# Patient Record
Sex: FEMALE | Race: WHITE | NOT HISPANIC OR LATINO | Employment: UNEMPLOYED | URBAN - NONMETROPOLITAN AREA
[De-identification: names, ages, dates, MRNs, and addresses within clinical notes are randomized per-mention and may not be internally consistent; named-entity substitution may affect disease eponyms.]

---

## 2017-05-09 ENCOUNTER — APPOINTMENT (OUTPATIENT)
Dept: LAB | Facility: MEDICAL CENTER | Age: 52
End: 2017-05-09
Payer: COMMERCIAL

## 2017-05-09 ENCOUNTER — TRANSCRIBE ORDERS (OUTPATIENT)
Dept: LAB | Facility: MEDICAL CENTER | Age: 52
End: 2017-05-09

## 2017-05-09 ENCOUNTER — ALLSCRIPTS OFFICE VISIT (OUTPATIENT)
Dept: OTHER | Facility: OTHER | Age: 52
End: 2017-05-09

## 2017-05-09 ENCOUNTER — HOSPITAL ENCOUNTER (OUTPATIENT)
Dept: RADIOLOGY | Facility: MEDICAL CENTER | Age: 52
Discharge: HOME | End: 2017-05-09
Payer: COMMERCIAL

## 2017-05-09 DIAGNOSIS — E11.29 TYPE 2 DIABETES MELLITUS WITH OTHER DIABETIC KIDNEY COMPLICATION (HCC): ICD-10-CM

## 2017-05-09 DIAGNOSIS — M79.671 PAIN OF RIGHT FOOT: ICD-10-CM

## 2017-05-09 DIAGNOSIS — D72.829 ELEVATED WHITE BLOOD CELL COUNT: ICD-10-CM

## 2017-05-09 LAB
ALBUMIN SERPL BCP-MCNC: 3.2 G/DL (ref 3.5–5)
ALP SERPL-CCNC: 112 U/L (ref 46–116)
ALT SERPL W P-5'-P-CCNC: 26 U/L (ref 12–78)
ANION GAP SERPL CALCULATED.3IONS-SCNC: 5 MMOL/L (ref 4–13)
AST SERPL W P-5'-P-CCNC: 10 U/L (ref 5–45)
BILIRUB SERPL-MCNC: 0.68 MG/DL (ref 0.2–1)
BUN SERPL-MCNC: 12 MG/DL (ref 5–25)
CALCIUM SERPL-MCNC: 8.6 MG/DL (ref 8.3–10.1)
CHLORIDE SERPL-SCNC: 104 MMOL/L (ref 100–108)
CHOLEST SERPL-MCNC: 236 MG/DL (ref 50–200)
CO2 SERPL-SCNC: 26 MMOL/L (ref 21–32)
CREAT SERPL-MCNC: 0.71 MG/DL (ref 0.6–1.3)
CREAT UR-MCNC: 133 MG/DL
ERYTHROCYTE [DISTWIDTH] IN BLOOD BY AUTOMATED COUNT: 13.2 % (ref 11.6–15.1)
EST. AVERAGE GLUCOSE BLD GHB EST-MCNC: 283 MG/DL
GFR SERPL CREATININE-BSD FRML MDRD: >60 ML/MIN/1.73SQ M
GLUCOSE P FAST SERPL-MCNC: 271 MG/DL (ref 65–99)
HBA1C MFR BLD: 11.5 % (ref 4.2–6.3)
HCT VFR BLD AUTO: 47.3 % (ref 34.8–46.1)
HDLC SERPL-MCNC: 41 MG/DL (ref 40–60)
HGB BLD-MCNC: 16.1 G/DL (ref 11.5–15.4)
LDLC SERPL CALC-MCNC: 152 MG/DL (ref 0–100)
MCH RBC QN AUTO: 30 PG (ref 26.8–34.3)
MCHC RBC AUTO-ENTMCNC: 34 G/DL (ref 31.4–37.4)
MCV RBC AUTO: 88 FL (ref 82–98)
MICROALBUMIN UR-MCNC: 10 MG/L (ref 0–20)
MICROALBUMIN/CREAT 24H UR: 8 MG/G CREATININE (ref 0–30)
PLATELET # BLD AUTO: 317 THOUSANDS/UL (ref 149–390)
PMV BLD AUTO: 11.2 FL (ref 8.9–12.7)
POTASSIUM SERPL-SCNC: 4 MMOL/L (ref 3.5–5.3)
PROT SERPL-MCNC: 7.2 G/DL (ref 6.4–8.2)
RBC # BLD AUTO: 5.37 MILLION/UL (ref 3.81–5.12)
SODIUM SERPL-SCNC: 135 MMOL/L (ref 136–145)
TRIGL SERPL-MCNC: 216 MG/DL
WBC # BLD AUTO: 13.74 THOUSAND/UL (ref 4.31–10.16)

## 2017-05-09 PROCEDURE — 82043 UR ALBUMIN QUANTITATIVE: CPT

## 2017-05-09 PROCEDURE — 36415 COLL VENOUS BLD VENIPUNCTURE: CPT

## 2017-05-09 PROCEDURE — 80061 LIPID PANEL: CPT

## 2017-05-09 PROCEDURE — 85027 COMPLETE CBC AUTOMATED: CPT

## 2017-05-09 PROCEDURE — 82570 ASSAY OF URINE CREATININE: CPT

## 2017-05-09 PROCEDURE — 80053 COMPREHEN METABOLIC PANEL: CPT

## 2017-05-09 PROCEDURE — 83036 HEMOGLOBIN GLYCOSYLATED A1C: CPT

## 2017-05-09 PROCEDURE — 73650 X-RAY EXAM OF HEEL: CPT

## 2017-05-11 ENCOUNTER — GENERIC CONVERSION - ENCOUNTER (OUTPATIENT)
Dept: OTHER | Facility: OTHER | Age: 52
End: 2017-05-11

## 2017-05-18 ENCOUNTER — APPOINTMENT (EMERGENCY)
Dept: RADIOLOGY | Facility: HOSPITAL | Age: 52
End: 2017-05-18
Payer: COMMERCIAL

## 2017-05-18 ENCOUNTER — APPOINTMENT (EMERGENCY)
Dept: CT IMAGING | Facility: HOSPITAL | Age: 52
End: 2017-05-18
Payer: COMMERCIAL

## 2017-05-18 ENCOUNTER — HOSPITAL ENCOUNTER (EMERGENCY)
Facility: HOSPITAL | Age: 52
Discharge: HOME | End: 2017-05-19
Attending: EMERGENCY MEDICINE | Admitting: EMERGENCY MEDICINE
Payer: COMMERCIAL

## 2017-05-18 DIAGNOSIS — E87.0 HYPERNATREMIA: Primary | ICD-10-CM

## 2017-05-18 DIAGNOSIS — N39.0 UTI (URINARY TRACT INFECTION): ICD-10-CM

## 2017-05-18 LAB
ACETONE SERPL-MCNC: NEGATIVE MG/DL
ALBUMIN SERPL BCP-MCNC: 3.2 G/DL (ref 3.5–5)
ALP SERPL-CCNC: 106 U/L (ref 46–116)
ALT SERPL W P-5'-P-CCNC: 32 U/L (ref 12–78)
ANION GAP SERPL CALCULATED.3IONS-SCNC: 13 MMOL/L (ref 4–13)
AST SERPL W P-5'-P-CCNC: 33 U/L (ref 5–45)
BASOPHILS # BLD AUTO: 0.12 THOUSANDS/ΜL (ref 0–0.1)
BASOPHILS NFR BLD AUTO: 1 % (ref 0–1)
BILIRUB SERPL-MCNC: 0.3 MG/DL (ref 0.2–1)
BUN SERPL-MCNC: 15 MG/DL (ref 5–25)
CALCIUM SERPL-MCNC: 9.2 MG/DL (ref 8.3–10.1)
CHLORIDE SERPL-SCNC: 100 MMOL/L (ref 100–108)
CO2 SERPL-SCNC: 25 MMOL/L (ref 21–32)
CREAT SERPL-MCNC: 1.06 MG/DL (ref 0.6–1.3)
DEPRECATED D DIMER PPP: 383 NG/ML (FEU) (ref 0–424)
EOSINOPHIL # BLD AUTO: 0.25 THOUSAND/ΜL (ref 0–0.61)
EOSINOPHIL NFR BLD AUTO: 2 % (ref 0–6)
ERYTHROCYTE [DISTWIDTH] IN BLOOD BY AUTOMATED COUNT: 13 % (ref 11.6–15.1)
GFR SERPL CREATININE-BSD FRML MDRD: 54.7 ML/MIN/1.73SQ M
GLUCOSE SERPL-MCNC: 329 MG/DL (ref 65–140)
GLUCOSE SERPL-MCNC: 347 MG/DL (ref 65–140)
HCT VFR BLD AUTO: 48.2 % (ref 34.8–46.1)
HGB BLD-MCNC: 16.4 G/DL (ref 11.5–15.4)
LYMPHOCYTES # BLD AUTO: 3.35 THOUSANDS/ΜL (ref 0.6–4.47)
LYMPHOCYTES NFR BLD AUTO: 22 % (ref 14–44)
MCH RBC QN AUTO: 29.3 PG (ref 26.8–34.3)
MCHC RBC AUTO-ENTMCNC: 34 G/DL (ref 31.4–37.4)
MCV RBC AUTO: 86 FL (ref 82–98)
MONOCYTES # BLD AUTO: 0.7 THOUSAND/ΜL (ref 0.17–1.22)
MONOCYTES NFR BLD AUTO: 5 % (ref 4–12)
NEUTROPHILS # BLD AUTO: 10.96 THOUSANDS/ΜL (ref 1.85–7.62)
NEUTS SEG NFR BLD AUTO: 70 % (ref 43–75)
PLATELET # BLD AUTO: 298 THOUSANDS/UL (ref 149–390)
PMV BLD AUTO: 10.5 FL (ref 8.9–12.7)
POTASSIUM SERPL-SCNC: 4.6 MMOL/L (ref 3.5–5.3)
PROT SERPL-MCNC: 7.6 G/DL (ref 6.4–8.2)
RBC # BLD AUTO: 5.59 MILLION/UL (ref 3.81–5.12)
SODIUM SERPL-SCNC: 138 MMOL/L (ref 136–145)
TROPONIN I SERPL-MCNC: 0.02 NG/ML
WBC # BLD AUTO: 15.38 THOUSAND/UL (ref 4.31–10.16)

## 2017-05-18 PROCEDURE — 71020 HB CHEST X-RAY 2VW FRONTAL&LATL: CPT

## 2017-05-18 PROCEDURE — 85025 COMPLETE CBC W/AUTO DIFF WBC: CPT | Performed by: EMERGENCY MEDICINE

## 2017-05-18 PROCEDURE — 93005 ELECTROCARDIOGRAM TRACING: CPT | Performed by: EMERGENCY MEDICINE

## 2017-05-18 PROCEDURE — 82009 KETONE BODYS QUAL: CPT | Performed by: EMERGENCY MEDICINE

## 2017-05-18 PROCEDURE — 80053 COMPREHEN METABOLIC PANEL: CPT | Performed by: EMERGENCY MEDICINE

## 2017-05-18 PROCEDURE — 82948 REAGENT STRIP/BLOOD GLUCOSE: CPT

## 2017-05-18 PROCEDURE — 84484 ASSAY OF TROPONIN QUANT: CPT | Performed by: EMERGENCY MEDICINE

## 2017-05-18 PROCEDURE — 85379 FIBRIN DEGRADATION QUANT: CPT | Performed by: EMERGENCY MEDICINE

## 2017-05-18 PROCEDURE — 70450 CT HEAD/BRAIN W/O DYE: CPT

## 2017-05-18 PROCEDURE — 96361 HYDRATE IV INFUSION ADD-ON: CPT

## 2017-05-18 PROCEDURE — 96360 HYDRATION IV INFUSION INIT: CPT

## 2017-05-18 RX ORDER — SIMVASTATIN 20 MG
20 TABLET ORAL
COMMUNITY
End: 2018-01-24 | Stop reason: SDUPTHER

## 2017-05-18 RX ADMIN — SODIUM CHLORIDE 1000 ML: 0.9 INJECTION, SOLUTION INTRAVENOUS at 22:36

## 2017-05-18 RX ADMIN — SODIUM CHLORIDE 1000 ML: 0.9 INJECTION, SOLUTION INTRAVENOUS at 23:21

## 2017-05-19 VITALS
OXYGEN SATURATION: 96 % | RESPIRATION RATE: 18 BRPM | HEART RATE: 86 BPM | DIASTOLIC BLOOD PRESSURE: 72 MMHG | WEIGHT: 175 LBS | TEMPERATURE: 97.6 F | SYSTOLIC BLOOD PRESSURE: 138 MMHG

## 2017-05-19 LAB
ATRIAL RATE: 91 BPM
BACTERIA UR QL AUTO: NORMAL /HPF
BILIRUB UR QL STRIP: NEGATIVE
CLARITY UR: CLEAR
COLOR UR: YELLOW
GLUCOSE UR STRIP-MCNC: ABNORMAL MG/DL
HGB UR QL STRIP.AUTO: NEGATIVE
KETONES UR STRIP-MCNC: NEGATIVE MG/DL
LEUKOCYTE ESTERASE UR QL STRIP: ABNORMAL
NITRITE UR QL STRIP: NEGATIVE
NON-SQ EPI CELLS URNS QL MICRO: NORMAL /HPF
P AXIS: 48 DEGREES
PH UR STRIP.AUTO: 5 [PH] (ref 4.5–8)
PR INTERVAL: 150 MS
PROT UR STRIP-MCNC: NEGATIVE MG/DL
QRS AXIS: 31 DEGREES
QRSD INTERVAL: 84 MS
QT INTERVAL: 362 MS
QTC INTERVAL: 445 MS
RBC #/AREA URNS AUTO: NORMAL /HPF
SP GR UR STRIP.AUTO: 1.01 (ref 1–1.03)
T WAVE AXIS: 31 DEGREES
UROBILINOGEN UR QL STRIP.AUTO: 0.2 E.U./DL
VENTRICULAR RATE: 91 BPM
WBC #/AREA URNS AUTO: NORMAL /HPF

## 2017-05-19 PROCEDURE — 81001 URINALYSIS AUTO W/SCOPE: CPT | Performed by: EMERGENCY MEDICINE

## 2017-05-19 PROCEDURE — 99285 EMERGENCY DEPT VISIT HI MDM: CPT

## 2017-05-19 RX ORDER — SULFAMETHOXAZOLE AND TRIMETHOPRIM 800; 160 MG/1; MG/1
1 TABLET ORAL 2 TIMES DAILY
Qty: 14 TABLET | Refills: 0 | Status: SHIPPED | OUTPATIENT
Start: 2017-05-19 | End: 2017-05-26

## 2017-05-19 RX ORDER — SULFAMETHOXAZOLE AND TRIMETHOPRIM 800; 160 MG/1; MG/1
1 TABLET ORAL ONCE
Status: COMPLETED | OUTPATIENT
Start: 2017-05-19 | End: 2017-05-19

## 2017-05-19 RX ADMIN — SULFAMETHOXAZOLE AND TRIMETHOPRIM 1 TABLET: 800; 160 TABLET ORAL at 00:57

## 2017-06-16 ENCOUNTER — ALLSCRIPTS OFFICE VISIT (OUTPATIENT)
Dept: OTHER | Facility: OTHER | Age: 52
End: 2017-06-16

## 2017-06-16 DIAGNOSIS — K92.1 MELENA: ICD-10-CM

## 2017-06-16 DIAGNOSIS — I10 ESSENTIAL (PRIMARY) HYPERTENSION: ICD-10-CM

## 2017-06-16 DIAGNOSIS — R10.30 LOWER ABDOMINAL PAIN: ICD-10-CM

## 2017-06-16 DIAGNOSIS — E11.29 TYPE 2 DIABETES MELLITUS WITH OTHER DIABETIC KIDNEY COMPLICATION (HCC): ICD-10-CM

## 2017-06-21 ENCOUNTER — HOSPITAL ENCOUNTER (OUTPATIENT)
Dept: CT IMAGING | Facility: HOSPITAL | Age: 52
Discharge: HOME | End: 2017-06-21
Payer: COMMERCIAL

## 2017-06-21 DIAGNOSIS — R10.30 LOWER ABDOMINAL PAIN: ICD-10-CM

## 2017-06-21 DIAGNOSIS — K92.1 MELENA: ICD-10-CM

## 2017-06-21 PROCEDURE — 74177 CT ABD & PELVIS W/CONTRAST: CPT

## 2017-06-21 RX ADMIN — IOHEXOL 100 ML: 350 INJECTION, SOLUTION INTRAVENOUS at 13:54

## 2017-06-23 ENCOUNTER — GENERIC CONVERSION - ENCOUNTER (OUTPATIENT)
Dept: OTHER | Facility: OTHER | Age: 52
End: 2017-06-23

## 2017-07-11 ENCOUNTER — APPOINTMENT (EMERGENCY)
Dept: RADIOLOGY | Facility: HOSPITAL | Age: 52
End: 2017-07-11
Payer: COMMERCIAL

## 2017-07-11 ENCOUNTER — HOSPITAL ENCOUNTER (EMERGENCY)
Facility: HOSPITAL | Age: 52
Discharge: HOME | End: 2017-07-11
Attending: EMERGENCY MEDICINE
Payer: COMMERCIAL

## 2017-07-11 VITALS
DIASTOLIC BLOOD PRESSURE: 62 MMHG | HEIGHT: 63 IN | SYSTOLIC BLOOD PRESSURE: 139 MMHG | OXYGEN SATURATION: 98 % | RESPIRATION RATE: 18 BRPM | HEART RATE: 94 BPM | TEMPERATURE: 98.3 F | BODY MASS INDEX: 31.01 KG/M2 | WEIGHT: 175 LBS

## 2017-07-11 DIAGNOSIS — M25.512 LEFT SHOULDER PAIN: Primary | ICD-10-CM

## 2017-07-11 DIAGNOSIS — M62.838 TRAPEZIUS MUSCLE SPASM: ICD-10-CM

## 2017-07-11 PROCEDURE — 99283 EMERGENCY DEPT VISIT LOW MDM: CPT

## 2017-07-11 PROCEDURE — 73030 X-RAY EXAM OF SHOULDER: CPT

## 2017-07-11 RX ORDER — TRAMADOL HYDROCHLORIDE 50 MG/1
50 TABLET ORAL EVERY 6 HOURS PRN
Qty: 15 TABLET | Refills: 0 | Status: ON HOLD | OUTPATIENT
Start: 2017-07-11 | End: 2018-01-23 | Stop reason: ALTCHOICE

## 2017-07-11 RX ORDER — LISINOPRIL 5 MG/1
5 TABLET ORAL DAILY
Status: ON HOLD | COMMUNITY
End: 2018-01-23 | Stop reason: ALTCHOICE

## 2017-07-17 ENCOUNTER — APPOINTMENT (OUTPATIENT)
Dept: LAB | Facility: MEDICAL CENTER | Age: 52
End: 2017-07-17
Payer: COMMERCIAL

## 2017-07-17 ENCOUNTER — TRANSCRIBE ORDERS (OUTPATIENT)
Dept: LAB | Facility: MEDICAL CENTER | Age: 52
End: 2017-07-17

## 2017-07-17 DIAGNOSIS — R10.30 LOWER ABDOMINAL PAIN: ICD-10-CM

## 2017-07-17 DIAGNOSIS — E11.29 TYPE 2 DIABETES MELLITUS WITH OTHER DIABETIC KIDNEY COMPLICATION (HCC): ICD-10-CM

## 2017-07-17 DIAGNOSIS — I10 ESSENTIAL (PRIMARY) HYPERTENSION: ICD-10-CM

## 2017-07-17 DIAGNOSIS — K92.1 MELENA: ICD-10-CM

## 2017-07-17 LAB
ANION GAP SERPL CALCULATED.3IONS-SCNC: 9 MMOL/L (ref 4–13)
BASOPHILS # BLD AUTO: 0.09 THOUSANDS/ΜL (ref 0–0.1)
BASOPHILS NFR BLD AUTO: 1 % (ref 0–1)
BUN SERPL-MCNC: 12 MG/DL (ref 5–25)
CALCIUM SERPL-MCNC: 9.5 MG/DL (ref 8.3–10.1)
CHLORIDE SERPL-SCNC: 102 MMOL/L (ref 100–108)
CO2 SERPL-SCNC: 26 MMOL/L (ref 21–32)
CREAT SERPL-MCNC: 0.77 MG/DL (ref 0.6–1.3)
EOSINOPHIL # BLD AUTO: 0.38 THOUSAND/ΜL (ref 0–0.61)
EOSINOPHIL NFR BLD AUTO: 2 % (ref 0–6)
ERYTHROCYTE [DISTWIDTH] IN BLOOD BY AUTOMATED COUNT: 13.5 % (ref 11.6–15.1)
GFR SERPL CREATININE-BSD FRML MDRD: >60 ML/MIN/1.73SQ M
GLUCOSE P FAST SERPL-MCNC: 202 MG/DL (ref 65–99)
HCT VFR BLD AUTO: 46.6 % (ref 34.8–46.1)
HGB BLD-MCNC: 15.5 G/DL (ref 11.5–15.4)
LYMPHOCYTES # BLD AUTO: 3.69 THOUSANDS/ΜL (ref 0.6–4.47)
LYMPHOCYTES NFR BLD AUTO: 22 % (ref 14–44)
MCH RBC QN AUTO: 29.2 PG (ref 26.8–34.3)
MCHC RBC AUTO-ENTMCNC: 33.3 G/DL (ref 31.4–37.4)
MCV RBC AUTO: 88 FL (ref 82–98)
MONOCYTES # BLD AUTO: 0.85 THOUSAND/ΜL (ref 0.17–1.22)
MONOCYTES NFR BLD AUTO: 5 % (ref 4–12)
NEUTROPHILS # BLD AUTO: 11.77 THOUSANDS/ΜL (ref 1.85–7.62)
NEUTS SEG NFR BLD AUTO: 70 % (ref 43–75)
NRBC BLD AUTO-RTO: 0 /100 WBCS
PLATELET # BLD AUTO: 371 THOUSANDS/UL (ref 149–390)
PMV BLD AUTO: 10.9 FL (ref 8.9–12.7)
POTASSIUM SERPL-SCNC: 4.3 MMOL/L (ref 3.5–5.3)
RBC # BLD AUTO: 5.31 MILLION/UL (ref 3.81–5.12)
SODIUM SERPL-SCNC: 137 MMOL/L (ref 136–145)
WBC # BLD AUTO: 16.85 THOUSAND/UL (ref 4.31–10.16)

## 2017-07-17 PROCEDURE — 36415 COLL VENOUS BLD VENIPUNCTURE: CPT

## 2017-07-17 PROCEDURE — 85025 COMPLETE CBC W/AUTO DIFF WBC: CPT

## 2017-07-17 PROCEDURE — 80048 BASIC METABOLIC PNL TOTAL CA: CPT

## 2017-07-18 ENCOUNTER — GENERIC CONVERSION - ENCOUNTER (OUTPATIENT)
Dept: OTHER | Facility: OTHER | Age: 52
End: 2017-07-18

## 2017-08-15 ENCOUNTER — ALLSCRIPTS OFFICE VISIT (OUTPATIENT)
Dept: OTHER | Facility: OTHER | Age: 52
End: 2017-08-15

## 2017-08-15 ENCOUNTER — APPOINTMENT (OUTPATIENT)
Dept: LAB | Facility: MEDICAL CENTER | Age: 52
End: 2017-08-15
Payer: COMMERCIAL

## 2017-08-15 ENCOUNTER — TRANSCRIBE ORDERS (OUTPATIENT)
Dept: LAB | Facility: MEDICAL CENTER | Age: 52
End: 2017-08-15

## 2017-08-15 DIAGNOSIS — M75.82 OTHER SHOULDER LESIONS, LEFT SHOULDER: ICD-10-CM

## 2017-08-15 DIAGNOSIS — E11.29 TYPE 2 DIABETES MELLITUS WITH OTHER DIABETIC KIDNEY COMPLICATION (HCC): ICD-10-CM

## 2017-08-15 DIAGNOSIS — D75.1 SECONDARY POLYCYTHEMIA: ICD-10-CM

## 2017-08-15 LAB
EST. AVERAGE GLUCOSE BLD GHB EST-MCNC: 192 MG/DL
HBA1C MFR BLD: 8.3 % (ref 4.2–6.3)

## 2017-08-15 PROCEDURE — 36415 COLL VENOUS BLD VENIPUNCTURE: CPT

## 2017-08-15 PROCEDURE — 83036 HEMOGLOBIN GLYCOSYLATED A1C: CPT

## 2017-08-17 ENCOUNTER — GENERIC CONVERSION - ENCOUNTER (OUTPATIENT)
Dept: OTHER | Facility: OTHER | Age: 52
End: 2017-08-17

## 2017-08-18 ENCOUNTER — ALLSCRIPTS OFFICE VISIT (OUTPATIENT)
Dept: OTHER | Facility: OTHER | Age: 52
End: 2017-08-18

## 2017-08-28 ENCOUNTER — APPOINTMENT (OUTPATIENT)
Dept: PHYSICAL THERAPY | Facility: CLINIC | Age: 52
End: 2017-08-28
Payer: COMMERCIAL

## 2017-08-28 DIAGNOSIS — M75.82 OTHER SHOULDER LESIONS, LEFT SHOULDER: ICD-10-CM

## 2017-08-28 PROCEDURE — 97535 SELF CARE MNGMENT TRAINING: CPT

## 2017-08-28 PROCEDURE — 97110 THERAPEUTIC EXERCISES: CPT

## 2017-08-28 PROCEDURE — 97161 PT EVAL LOW COMPLEX 20 MIN: CPT

## 2017-08-28 PROCEDURE — 97140 MANUAL THERAPY 1/> REGIONS: CPT

## 2017-09-01 ENCOUNTER — ALLSCRIPTS OFFICE VISIT (OUTPATIENT)
Dept: OTHER | Facility: OTHER | Age: 52
End: 2017-09-01

## 2017-09-09 ENCOUNTER — TRANSCRIBE ORDERS (OUTPATIENT)
Dept: ADMINISTRATIVE | Facility: HOSPITAL | Age: 52
End: 2017-09-09

## 2017-09-09 ENCOUNTER — APPOINTMENT (OUTPATIENT)
Dept: LAB | Facility: HOSPITAL | Age: 52
End: 2017-09-09
Attending: INTERNAL MEDICINE
Payer: COMMERCIAL

## 2017-09-09 DIAGNOSIS — D75.1 SECONDARY POLYCYTHEMIA: ICD-10-CM

## 2017-09-09 PROCEDURE — 88374 M/PHMTRC ALYS ISHQUANT/SEMIQ: CPT

## 2017-09-09 PROCEDURE — 36415 COLL VENOUS BLD VENIPUNCTURE: CPT

## 2017-09-09 PROCEDURE — 82668 ASSAY OF ERYTHROPOIETIN: CPT

## 2017-09-09 PROCEDURE — 81270 JAK2 GENE: CPT

## 2017-09-12 LAB — EPO SERPL-ACNC: 11.6 MIU/ML (ref 2.6–18.5)

## 2017-09-18 LAB
SCAN RESULT: NORMAL
SCAN RESULT: NORMAL

## 2017-10-06 ENCOUNTER — ALLSCRIPTS OFFICE VISIT (OUTPATIENT)
Dept: OTHER | Facility: OTHER | Age: 52
End: 2017-10-06

## 2017-10-06 DIAGNOSIS — J06.9 ACUTE UPPER RESPIRATORY INFECTION: ICD-10-CM

## 2017-10-06 DIAGNOSIS — D75.1 SECONDARY POLYCYTHEMIA: ICD-10-CM

## 2017-10-07 NOTE — PROGRESS NOTES
Assessment  1  Leukocytosis (288 60) (D72 829)    Plan  Erythrocytosis    · Drink plenty of fluids ; Status:Complete;   Done: 41SGR3387   Ordered;For:Erythrocytosis; Ordered By:Clara Acosta;   · (1) CBC/PLT/DIFF; Status:Active; Requested WIM:05OUS2887;    Perform:[facility]; VYV:04ZCF9567; Ordered;For:Erythrocytosis; Ordered By:Clara Acosta;   · (1) CBC/PLT/DIFF; Status: In Progress - Order Generated; Requested for:Recurring  Schedule: 10/6/2017; 11/3/2017; 2017; 2017; 2018; 2018;  3/23/2018;    ;    Perform:[facility]; [id number]; Ordered;For:Erythrocytosis; Ordered By:Clara Acosta;   · Follow-up visit in 3 months Evaluation and Treatment  Follow-up  Status: Complete   Done: 41YXV7542 02:15PM   Ordered; For: Erythrocytosis; Ordered By: Juan Wolf Performed:  Due: 07PRI0333; Last Updated By: Zeenat Gibbons; 10/6/2017 12:45:56 PM    Discussion/Summary  Discussion Summary:   In summary, this is a 59-year-old female history of erythrocytosis and leukocytosis as outlined  level was 11  FISH for [place] chromosome was negative  SYDNEY 2 mutation was negative  of her with row cytosis and leukocytosis is unclear  level is not elevated  This argues against the possibility of erythropoietin driven erythrocytosis  In other words, erythrocytosis is endogenous  2 mutation provides only moderate negative predictive value regarding the possibility of a myeloproliferative disorder  these data I would say that it is possible that she has polycythemia  I suggested aspirin 81 mg p  o  daily  Additionally I suggested regular follow-up with CBC on a q 4 weeks basis to trend her blood counts  If they move upward side a reductive therapy or phlebotomy could be considered  If they are stable in fluctuating aspirin monotherapy would be advocated  potential connection between her symptomatology and abnormal blood counts is questionable   If intervention for progressive blood count abnormality is indicated, the impact on her symptoms would be interpreted  reviewed the above with the patient and her daughter  They voiced understanding and agreement  Counseling Documentation With Imm: The patient was counseled regarding diagnostic results,-instructions for management,-patient and family education,-impressions  total time of encounter was 40 minutes  Chief Complaint  Chief Complaint Free Text Note Form: Follow-up regarding erythrocytosis  History of Present Illness  HPI: I'm tired all the time ' Off/on since EARLE/BSO in 2005 2017 routine CBC showed white count of 16 8, hemoglobin 15 5, hematocrit 46 6, platelet count 047, normal differential abnormalities have been present during a few determinations dating back to 2014 2017- EPO level was 11  FISH for [place] chromosome was negative  SYDNEY 2 mutation was negative  Review of Systems  Complete-Female:   Constitutional: No fever, no chills, feels well, no tiredness, no recent weight gain or weight loss  Eyes: No complaints of eye pain, no red eyes, no eyesight problems, no discharge, no dry eyes, no itching of eyes  ENT: no complaints of earache, no loss of hearing, no nose bleeds, no nasal discharge, no sore throat, no hoarseness  Cardiovascular: No complaints of slow heart rate, no fast heart rate, no chest pain, no palpitations, no leg claudication, no lower extremity edema  Respiratory: No complaints of shortness of breath, no wheezing, no cough, no SOB on exertion, no orthopnea, no PND  Gastrointestinal: bloody stools-and-for about 20 years  -   The patient presents with complaints of mild nausea starting 20 years ago  Genitourinary: No complaints of dysuria, no incontinence, no pelvic pain, no dysmenorrhea, no vaginal discharge or bleeding  Musculoskeletal: arthralgias-and-diffuse arthralgias  Integumentary: No complaints of skin rash or lesions, no itching, no skin wounds, no breast pain or lump     Neurological: No complaints of headache, no confusion, no convulsions, no numbness, no dizziness or fainting, no tingling, no limb weakness, no difficulty walking  Psychiatric: Not suicidal, no sleep disturbance, no anxiety or depression, no change in personality, no emotional problems  Endocrine: No complaints of proptosis, no hot flashes, no muscle weakness, no deepening of the voice, no feelings of weakness  Hematologic/Lymphatic: No complaints of swollen glands, no swollen glands in the neck, does not bleed easily, does not bruise easily  Active Problems  1  Diverticulosis of large intestine without hemorrhage (562 10) (K57 30)   2  Erythrocytosis (289 0) (D75 1)   3  Hepatic steatosis (571 8) (K76 0)   4  Hypertension (401 9) (I10)   5  Leukocytosis (288 60) (D72 829)   6  Mixed hyperlipidemia (272 2) (E78 2)   7  Pain, joint, shoulder (719 41) (M25 519)   8  Rectal bleeding (569 3) (K62 5)   9  Tendinitis of left rotator cuff (726 10) (M75 82)   10  Type 2 diabetes mellitus with other kidney complication (124 04) (A35 66)    Past Medical History  1  History of Acute upper respiratory infection (465 9) (J06 9)   2  History of Atypical mole (216 9) (D22 9)   3  History of Blood in stool (578 1) (K92 1)   4  History of Candidiasis, cutaneous (112 3) (B37 2)   5  History of Encounter for screening mammogram for breast cancer (V76 12) (Z12 31)   6  History of Groin pain (789 09) (R10 30)   7  History of acute sinusitis (V12 69) (Z87 09)   8  History of allergic rhinitis (V12 69) (Z87 09)   9  History of influenza vaccination (V49 89) (Z92 29)   10  History of leukocytosis (V12 3) (Z86 2)   11  History of rectal bleeding (V12 79) (Z87 19)   12  History of seasonal allergies (V15 09) (Z88 9)   13  History of type 2 diabetes mellitus (V12 29) (Z86 39)   14  History of Pain of right heel (729 5) (M79 671)   15  History of Screening for depression (V79 0) (Z13 89)   16   History of Thigh lump (782 2) (R22 40)    Surgical History  1  History of Appendectomy   2  History of Arthroscopy Knee Left   3  History of Arthroscopy Knee Right   4  History of Breast Surgery Reduction Procedure   5  History of Gallbladder Surgery   6  History of Hernia Repair Inguinal Bilateral   7  History of Hysterectomy   8  History of Shoulder Surgery    Family History  Mother    1  Family history of malignant neoplasm of breast (V16 3) (Z80 3)   2  Family history of malignant neoplasm of vulva (V16 49) (Z80 49)  Father    3  No pertinent family history  Maternal Grandmother    4  Family history of malignant neoplasm of vulva (V16 49) (Z80 49)    Social History   · Current every day smoker (305 1) (F17 200)   · Current Smoker (305 1)   · Occasional alcohol use    Current Meds   1  Fluticasone Propionate 50 MCG/ACT Nasal Suspension; USE 2 SPRAYS IN EACH   NOSTRIL ONCE DAILY; Therapy: 45SSI7704 to (Evaluate:51Jhd1230)  Requested for: 04INP1252; Last   KP:40ZQJ8761 Ordered   2  Januvia 100 MG Oral Tablet; TAKE 1 TABLET DAILY; Therapy: 74GFR5968 to (Evaluate:46Bbc8872)  Requested for: 89Rss4194; Last   Rx:27Hai9546 Ordered   3  Losartan Potassium 25 MG Oral Tablet; Take 1 tablet daily; Therapy: 52KRI6028 to (Last Rx:62Wdz9998)  Requested for: 33Oln4440 Ordered   4  MetFORMIN HCl - 1000 MG Oral Tablet; Take 1 tablet twice daily; Therapy: 60RQR1921 to (Last Rx:37Nzs9920)  Requested for: 89SKM2662 Ordered   5  Simvastatin 20 MG Oral Tablet; TAKE 1 TABLET AT BEDTIME; Therapy: 36OLH1412 to (Evaluate:99Uhp1864)  Requested for: 78NOH1990; Last   Rx:27Rip9292 Ordered   6  TraMADol HCl - 50 MG Oral Tablet; TAKE 1 TABLET Every 6 hours PRN; Therapy: 84WYU2686 to Recorded    Allergies  1  Clomid TABS  2   Latex    Vitals  Vital Signs    Recorded: 77ONU2289 11:53AM   Temperature 97 8 F   Heart Rate 89   Respiration 15   Systolic 307   Diastolic 68   Height 5 ft 3 in   Weight 172 lb 2 oz   BMI Calculated 30 49   BSA Calculated 1 81   O2 Saturation 96   Pain Scale 7 Physical Exam    Constitutional   General appearance: No acute distress, well appearing and well nourished  Eyes   Conjunctiva and lids: No swelling, erythema or discharge  Ears, Nose, Mouth, and Throat   External inspection of ears and nose: Normal     Oropharynx: Normal with no erythema, edema, exudate or lesions  Pulmonary   Auscultation of lungs: Clear to auscultation  Cardiovascular   Auscultation of heart: Normal rate and rhythm, normal S1 and S2, without murmurs  Examination of extremities for edema and/or varicosities: Normal     Abdomen   Abdomen: Non-tender, no masses  Liver and spleen: No hepatomegaly or splenomegaly  Lymphatic   Palpation of lymph nodes in neck: No lymphadenopathy  Musculoskeletal   Gait and station: Normal     Skin   Skin and subcutaneous tissue: Normal without rashes or lesions  Neurologic   Cranial nerves: Cranial nerves 2-12 intact  Psychiatric   Orientation to person, place, and time: Normal          Future Appointments    Date/Time Provider Specialty Site   01/05/2018 02:15 PM FERNANDO Gonzalez , [facility] Hematology Oncology [facility] [place]   11/07/2017 09:30 AM Amado Scales DO Gastroenterology Adult [facility] [place] [facility]     Signatures   Electronically signed by : FERNANDO Flaherty  Oct  6 2017  1:29PM EST                       (Author)

## 2017-10-27 ENCOUNTER — APPOINTMENT (OUTPATIENT)
Dept: RADIOLOGY | Facility: MEDICAL CENTER | Age: 52
End: 2017-10-27
Payer: COMMERCIAL

## 2017-10-27 ENCOUNTER — TRANSCRIBE ORDERS (OUTPATIENT)
Dept: RADIOLOGY | Facility: MEDICAL CENTER | Age: 52
End: 2017-10-27

## 2017-10-27 ENCOUNTER — APPOINTMENT (OUTPATIENT)
Dept: LAB | Facility: MEDICAL CENTER | Age: 52
End: 2017-10-27
Payer: COMMERCIAL

## 2017-10-27 ENCOUNTER — ALLSCRIPTS OFFICE VISIT (OUTPATIENT)
Dept: OTHER | Facility: OTHER | Age: 52
End: 2017-10-27

## 2017-10-27 DIAGNOSIS — J06.9 ACUTE UPPER RESPIRATORY INFECTION: ICD-10-CM

## 2017-10-27 DIAGNOSIS — D75.1 SECONDARY POLYCYTHEMIA: ICD-10-CM

## 2017-10-27 LAB
BASOPHILS # BLD AUTO: 0.11 THOUSANDS/ΜL (ref 0–0.1)
BASOPHILS NFR BLD AUTO: 1 % (ref 0–1)
EOSINOPHIL # BLD AUTO: 0.37 THOUSAND/ΜL (ref 0–0.61)
EOSINOPHIL NFR BLD AUTO: 2 % (ref 0–6)
ERYTHROCYTE [DISTWIDTH] IN BLOOD BY AUTOMATED COUNT: 13.8 % (ref 11.6–15.1)
HCT VFR BLD AUTO: 43.7 % (ref 34.8–46.1)
HGB BLD-MCNC: 14.7 G/DL (ref 11.5–15.4)
LYMPHOCYTES # BLD AUTO: 3.94 THOUSANDS/ΜL (ref 0.6–4.47)
LYMPHOCYTES NFR BLD AUTO: 25 % (ref 14–44)
MCH RBC QN AUTO: 29.4 PG (ref 26.8–34.3)
MCHC RBC AUTO-ENTMCNC: 33.6 G/DL (ref 31.4–37.4)
MCV RBC AUTO: 87 FL (ref 82–98)
MONOCYTES # BLD AUTO: 0.98 THOUSAND/ΜL (ref 0.17–1.22)
MONOCYTES NFR BLD AUTO: 6 % (ref 4–12)
NEUTROPHILS # BLD AUTO: 10.32 THOUSANDS/ΜL (ref 1.85–7.62)
NEUTS SEG NFR BLD AUTO: 66 % (ref 43–75)
NRBC BLD AUTO-RTO: 0 /100 WBCS
PLATELET # BLD AUTO: 404 THOUSANDS/UL (ref 149–390)
PMV BLD AUTO: 10.5 FL (ref 8.9–12.7)
RBC # BLD AUTO: 5 MILLION/UL (ref 3.81–5.12)
WBC # BLD AUTO: 15.8 THOUSAND/UL (ref 4.31–10.16)

## 2017-10-27 PROCEDURE — 36415 COLL VENOUS BLD VENIPUNCTURE: CPT

## 2017-10-27 PROCEDURE — 71020 HB CHEST X-RAY 2VW FRONTAL&LATL: CPT

## 2017-10-27 PROCEDURE — 85025 COMPLETE CBC W/AUTO DIFF WBC: CPT

## 2017-10-28 NOTE — PROGRESS NOTES
Assessment  1  Current every day smoker (305 1) (F17 200)   2  Acute upper respiratory infection (465 9) (J06 9)    Plan  Acute upper respiratory infection    · Guaifenesin-Codeine 100-10 MG/5ML Oral Solution; TAKE 10 ML 3 times daily  PRN cough   · PredniSONE 10 MG Oral Tablet; 4 tabs po qd for 3 days, then 3 tabs po qd for 3  days, then 2 tabs po qd for 3 days, then 1 tab po qd for 3 days  take pills with food   · * XR CHEST PA & LATERAL; Status:Active; Requested VCE:75ZBC3804;    · Follow Up if Not Better Evaluation and Treatment  Follow-up  Status: Complete  Done:  27Oct2017  Screening for diabetic peripheral neuropathy    · Stop: Influenza  SocHx: Current every day smoker, Screening for depression    · *VB-Depression Screening; Status:Complete - Retrospective By Protocol Authorization;    Done: 43JPI3579 08:58AM  SocHx: Current every day smoker, Screening for diabetic peripheral neuropathy    · *VB - Foot Exam; Status:Temporary Deferral - Patient reports item recently done;     10/27/2018    Discussion/Summary    Will get CXR and Rx  for prednisone and Robitussin with Codiene  Push fluids  Call if sx  continue or increase  Possible side effects of new medications were reviewed with the patient/guardian today  The treatment plan was reviewed with the patient/guardian  The patient/guardian understands and agrees with the treatment plan      Chief Complaint  URI  History of Present Illness  HPI: URI for the past 3 weeks  Has been on Z-pack and Augmentin  Has productive cough  Chills  No N/V/D  Upper Respiratory Infection (Brief): The patient is being seen for follow-up of an upper respiratory infection  Symptoms: nasal congestion,-- productive cough-- and-- colored sputum--    The patient presents with complaints of sore throat starting October 6, 2017  She is currently experiencing sore throat  Symptoms are unchanged  Symptom Cluster Details: she reports the symptoms are unchanged   Associated Symptoms: chills, but-- no fever,-- no nausea,-- no vomiting-- and-- no diarrhea  Review of Systems    Constitutional: as noted in HPI    ENT: as noted in HPI  Cardiovascular: no complaints of slow or fast heart rate, no chest pain, no palpitations, no leg claudication or lower extremity edema  Respiratory: as noted in HPI  Gastrointestinal: no complaints of abdominal pain, no constipation, no nausea or diarrhea, no vomiting, no bloody stools  Genitourinary: no complaints of dysuria, no incontinence, no pelvic pain, no dysmenorrhea, no vaginal discharge or abnormal vaginal bleeding  Active Problems  1  Acute upper respiratory infection (465 9) (J06 9)   2  Diverticulosis of large intestine without hemorrhage (562 10) (K57 30)   3  Erythrocytosis (289 0) (D75 1)   4  Hepatic steatosis (571 8) (K76 0)   5  Hypertension (401 9) (I10)   6  Leukocytosis (288 60) (D72 829)   7  Mixed hyperlipidemia (272 2) (E78 2)   8  Pain, joint, shoulder (719 41) (M25 519)   9  Rectal bleeding (569 3) (K62 5)   10  Screening for depression (V79 0) (Z13 89)   11  Screening for diabetic peripheral neuropathy (V80 09) (Z13 89)   12  Tendinitis of left rotator cuff (726 10) (M75 82)   13  Type 2 diabetes mellitus with other kidney complication (581 70) (D37 24)    Past Medical History  1  History of Atypical mole (216 9) (D22 9)   2  History of Blood in stool (578 1) (K92 1)   3  History of Candidiasis, cutaneous (112 3) (B37 2)   4  History of Encounter for screening mammogram for breast cancer (V76 12) (Z12 31)   5  History of Groin pain (789 09) (R10 30)   6  History of acute sinusitis (V12 69) (Z87 09)   7  History of allergic rhinitis (V12 69) (Z87 09)   8  History of influenza vaccination (V49 89) (Z92 29)   9  History of leukocytosis (V12 3) (Z86 2)   10  History of rectal bleeding (V12 79) (Z87 19)   11  History of seasonal allergies (V15 09) (Z88 9)   12  History of type 2 diabetes mellitus (V12 29) (Z86 39)   13   History of Pain of right heel (729 5) (M79 671)   14  History of Thigh lump (782 2) (R22 40)  Active Problems And Past Medical History Reviewed: The active problems and past medical history were reviewed and updated today  Family History  Mother    1  Family history of malignant neoplasm of breast (V16 3) (Z80 3)   2  Family history of malignant neoplasm of vulva (V16 49) (Z80 49)  Father    3  No pertinent family history  Maternal Grandmother    4  Family history of malignant neoplasm of vulva (V16 49) (Z80 49)  Family History Reviewed: The family history was reviewed and updated today  Social History   · Current every day smoker (305 1) (F17 200)   · Current Smoker (305 1)   · No advance directive on file (V49 89) (Z78 9)   · No living will   · Occasional alcohol use  The social history was reviewed and updated today  The social history was reviewed and is unchanged  Surgical History  1  History of Appendectomy   2  History of Arthroscopy Knee Left   3  History of Arthroscopy Knee Right   4  History of Breast Surgery Reduction Procedure   5  History of Gallbladder Surgery   6  History of Hernia Repair Inguinal Bilateral   7  History of Hysterectomy   8  History of Shoulder Surgery  Surgical History Reviewed: The surgical history was reviewed and updated today  Current Meds   1  Fluticasone Propionate 50 MCG/ACT Nasal Suspension; USE 2 SPRAYS IN EACH   NOSTRIL ONCE DAILY; Therapy: 06MBU3395 to (Evaluate:85Vrz7058)  Requested for: 72GJY0065; Last   RH:18YFL4223 Ordered   2  Januvia 100 MG Oral Tablet; TAKE 1 TABLET DAILY; Therapy: 01XGL7898 to (Evaluate:27Dvn6005)  Requested for: 49Rgt8721; Last   Rx:70Cpu5096 Ordered   3  Losartan Potassium 25 MG Oral Tablet; Take 1 tablet daily; Therapy: 70WGS3855 to (Last Rx:69Kcq2778)  Requested for: 65Axf5744 Ordered   4  MetFORMIN HCl - 1000 MG Oral Tablet; Take 1 tablet twice daily;    Therapy: 98DWN7663 to (Last Rx:13Szo4519)  Requested for: 65GKL4219 Ordered   5  Simvastatin 20 MG Oral Tablet; TAKE 1 TABLET AT BEDTIME; Therapy: 96LLZ3901 to (Evaluate:10Aug2017)  Requested for: 57NNN7914; Last   Rx:40Dbb0489 Ordered   6  TraMADol HCl - 50 MG Oral Tablet; TAKE 1 TABLET Every 6 hours PRN; Therapy: 26LCE1235 to Recorded    The medication list was reviewed and updated today  Allergies  1  Clomid TABS  2  Latex    Vitals   Recorded: 23UPV3690 08:55AM   Temperature 27 2 F   Systolic 722   Diastolic 66   Height 5 ft 3 in   Weight 176 lb    BMI Calculated 31 18   BSA Calculated 1 83     Physical Exam    Constitutional   General appearance: No acute distress, well appearing and well nourished  Ears, Nose, Mouth, and Throat   Otoscopic examination: Tympanic membranes translucent with normal light reflex  Canals patent without erythema  Oropharynx: Abnormal   The posterior pharynx was erythematous-- and-- white PND, but-- did not have an exudate  Pulmonary   Respiratory effort: No increased work of breathing or signs of respiratory distress  Auscultation of lungs: Abnormal   rhonchi over the right base  Cardiovascular   Auscultation of heart: Normal rate and rhythm, normal S1 and S2, without murmurs      Examination of extremities for edema and/or varicosities: Normal     Psychiatric   Orientation to person, place, and time: Normal     Mood and affect: Normal          Results/Data  *VB-Depression Screening 01YYA1150 08:58AM [place] English     Test Name Result Flag Reference   Depression Scale Result      Depression Screen - Negative For Symptoms       Future Appointments    Date/Time Provider Specialty Site   01/05/2018 02:15 PM FERNANDO Mo , , [facility] Hematology Oncology [facility] [place]   11/07/2017 09:30 AM Daryl Omer DO [facility]     Signatures   Electronically signed by : Zackary Louis DO; Oct 27 2017  9:30AM EST                       (Author)

## 2017-10-30 ENCOUNTER — GENERIC CONVERSION - ENCOUNTER (OUTPATIENT)
Dept: OTHER | Facility: OTHER | Age: 52
End: 2017-10-30

## 2017-11-06 ENCOUNTER — ANESTHESIA EVENT (OUTPATIENT)
Dept: PERIOP | Facility: HOSPITAL | Age: 52
End: 2017-11-06

## 2017-11-07 ENCOUNTER — ANESTHESIA (OUTPATIENT)
Dept: PERIOP | Facility: HOSPITAL | Age: 52
End: 2017-11-07

## 2017-12-01 DIAGNOSIS — D75.1 SECONDARY POLYCYTHEMIA: ICD-10-CM

## 2018-01-05 ENCOUNTER — ALLSCRIPTS OFFICE VISIT (OUTPATIENT)
Dept: OTHER | Facility: OTHER | Age: 53
End: 2018-01-05

## 2018-01-06 NOTE — PROGRESS NOTES
Assessment   1  Leukocytosis (288 60) (D72 829)    Plan   Leukocytosis    · Drink plenty of fluids ; Status:Complete;   Done: 46VXG2044   Ordered; For:Leukocytosis; Ordered By:Xavier Acosta;   · Follow-up visit in 4 Months Evaluation and Treatment  Follow-up  Status: Hold For -    Scheduling  Requested for: 89JFD2507   Ordered; For: Leukocytosis; Ordered By: Vinita Mejia Performed:  Due: 26VQR7259   · (1) CBC/PLT/DIFF; Status:Active; Requested for:01May2018; Perform:[facility]; CUF:74AZT0703;ONWPLQF; For:Leukocytosis; Ordered By:Xavier Acosta;   · (1) COMPREHENSIVE METABOLIC PANEL; Status:Active; Requested for:01May2018; Perform:[facility]; WIH:35GVS4590;SSRZMJX; For:Leukocytosis; Ordered By:Xavier Acosta;    Discussion/Summary   Discussion Summary:    In summary, this is a 49-year-old female history of leukocytosis as outlined  recent blood work is in late October 2017 showing essentially similar values to what she had demonstrated previously  believes that she had blood work done in November but I was not able to find any record of that   she reports intermittent lymphadenopathy in the neck  This is not tender  She reports frequent hot flashes  She has not taken her temperature  She has diffuse arthralgias which are migratory  There is no joint swelling or erythema, however  has persistent nausea and vomiting for many years  She states that her appetite is quite poor although her weight is essentially been stable for at least 9 months  is recommended at this time  I asked her to take her temperature and record that to determine whether any fever is occurring  all is well see her back in 4 months for review  Counseling Documentation With Imm: The patient, patient's family was counseled regarding diagnostic results,-- instructions for management,-- patient and family education,-- impressions  total time of encounter was 15 minutes        Chief Complaint   Chief Complaint Free Text Note Form: Follow-up regarding leukocytosis  History of Present Illness   HPI: I'm tired all the time ' Off/on since EARLE/BSO in 2005 2017 routine CBC showed white count of 16 8, hemoglobin 15 5, hematocrit 46 6, platelet count 020, normal differential  abnormalities have been present during a few determinations dating back to 2014 2017- EPO level was 11  FISH for [place] chromosome was negative  SYDNEY 2 mutation was negative  Review of Systems   Complete-Female:      Constitutional: No fever, no chills, feels well, no tiredness, no recent weight gain or weight loss  Eyes: No complaints of eye pain, no red eyes, no eyesight problems, no discharge, no dry eyes, no itching of eyes  ENT: no complaints of earache, no loss of hearing, no nose bleeds, no nasal discharge, no sore throat, no hoarseness  Cardiovascular: No complaints of slow heart rate, no fast heart rate, no chest pain, no palpitations, no leg claudication, no lower extremity edema  Respiratory: No complaints of shortness of breath, no wheezing, no cough, no SOB on exertion, no orthopnea, no PND  Gastrointestinal: bloody stools-- and-- for about 20 years  --       The patient presents with complaints of mild nausea starting 20 years ago  Genitourinary: No complaints of dysuria, no incontinence, no pelvic pain, no dysmenorrhea, no vaginal discharge or bleeding  Musculoskeletal: arthralgias-- and-- diffuse arthralgias  Integumentary: No complaints of skin rash or lesions, no itching, no skin wounds, no breast pain or lump  Neurological: No complaints of headache, no confusion, no convulsions, no numbness, no dizziness or fainting, no tingling, no limb weakness, no difficulty walking  Psychiatric: Not suicidal, no sleep disturbance, no anxiety or depression, no change in personality, no emotional problems        Endocrine: No complaints of proptosis, no hot flashes, no muscle weakness, no deepening of the voice, no feelings of weakness  Hematologic/Lymphatic: No complaints of swollen glands, no swollen glands in the neck, does not bleed easily, does not bruise easily  Active Problems   1  Acute upper respiratory infection (465 9) (J06 9)   2  Diverticulosis of large intestine without hemorrhage (562 10) (K57 30)   3  Erythrocytosis (289 0) (D75 1)   4  Hepatic steatosis (571 8) (K76 0)   5  Hypertension (401 9) (I10)   6  Leukocytosis (288 60) (D72 829)   7  Mixed hyperlipidemia (272 2) (E78 2)   8  Pain, joint, shoulder (719 41) (M25 519)   9  Rectal bleeding (569 3) (K62 5)   10  Screening for depression (V79 0) (Z13 89)   11  Screening for diabetic peripheral neuropathy (V80 09) (Z13 89)   12  Tendinitis of left rotator cuff (726 10) (M75 82)   13  Type 2 diabetes mellitus with other kidney complication (197 60) (W97 29)    Past Medical History   1  History of Atypical mole (216 9) (D22 9)   2  History of Blood in stool (578 1) (K92 1)   3  History of Candidiasis, cutaneous (112 3) (B37 2)   4  History of Encounter for screening mammogram for breast cancer (V76 12) (Z12 31)   5  History of Groin pain (789 09) (R10 30)   6  History of acute sinusitis (V12 69) (Z87 09)   7  History of allergic rhinitis (V12 69) (Z87 09)   8  History of influenza vaccination (V49 89) (Z92 29)   9  History of leukocytosis (V12 3) (Z86 2)   10  History of rectal bleeding (V12 79) (Z87 19)   11  History of seasonal allergies (V15 09) (Z88 9)   12  History of type 2 diabetes mellitus (V12 29) (Z86 39)   13  History of Pain of right heel (729 5) (M79 671)   14  History of Thigh lump (782 2) (R22 40)    Surgical History   1  History of Appendectomy   2  History of Arthroscopy Knee Left   3  History of Arthroscopy Knee Right   4  History of Breast Surgery Reduction Procedure   5  History of Gallbladder Surgery   6  History of Hernia Repair Inguinal Bilateral   7  History of Hysterectomy   8   History of Shoulder Surgery    Family History   Mother    1  Family history of malignant neoplasm of breast (V16 3) (Z80 3)   2  Family history of malignant neoplasm of vulva (V16 49) (Z80 49)  Father    3  No pertinent family history  Maternal Grandmother    4  Family history of malignant neoplasm of vulva (V16 49) (Z80 49)    Social History    · Current every day smoker (305 1) (F17 200)   · Current Smoker (305 1)   · No advance directive on file (V49 89) (Z78 9)   · No living will   · Occasional alcohol use    Current Meds    1  Fluticasone Propionate 50 MCG/ACT Nasal Suspension; USE 2 SPRAYS IN EACH     NOSTRIL ONCE DAILY; Therapy: 69NVX6127 to (Evaluate:20Nro0391)  Requested for: 65PHG7483; Last     IJ:32TGX7791 Ordered   2  Guaifenesin-Codeine 100-10 MG/5ML Oral Solution; TAKE 10 ML 3 times daily PRN     cough; Therapy: 96IOF2908 to (Evaluate:04Nov2017); Last Rx:54Vvu1643 Ordered   3  Januvia 100 MG Oral Tablet; TAKE 1 TABLET DAILY; Therapy: 71RGM4093 to (Evaluate:10Nit2310)  Requested for: 61Rpm7888; Last     Rx:10Sye2955 Ordered   4  Losartan Potassium 25 MG Oral Tablet; Take 1 tablet daily; Therapy: 56ICF1449 to (Last Rx:35Spy0888)  Requested for: 51Eaq9554 Ordered   5  MetFORMIN HCl - 1000 MG Oral Tablet; Take 1 tablet twice daily; Therapy: 41ICX6932 to (Last Rx:62Vgs6349)  Requested for: 09WCX3001 Ordered   6  PredniSONE 10 MG Oral Tablet; 4 tabs po qd for 3 days, then 3 tabs po qd for 3 days,     then 2 tabs po qd for 3 days, then 1 tab po qd for 3 days  take pills with food; Therapy: 11ODO1589 to (Last Rx:27Oct2017)  Requested for: 27Oct2017 Ordered   7  Simvastatin 20 MG Oral Tablet; TAKE 1 TABLET AT BEDTIME; Therapy: 63GJD4128 to (Evaluate:87Fhf0360)  Requested for: 63BSS6358; Last     Rx:66Znu5153 Ordered   8  TraMADol HCl - 50 MG Oral Tablet; TAKE 1 TABLET Every 6 hours PRN; Therapy: 54GHE3674 to Recorded    Allergies   1  Clomid TABS  2   Latex    Vitals   Vital Signs    Recorded: 40MET1144 02:47PM   Temperature 97 7 F   Heart Rate 103   Respiration 16   Systolic 421   Diastolic 76   Height 5 ft 3 in   Weight 176 lb 8 oz   BMI Calculated 31 27   BSA Calculated 1 83   O2 Saturation 96   Pain Scale 6     Physical Exam        Constitutional      General appearance: No acute distress, well appearing and well nourished  Eyes      Conjunctiva and lids: No swelling, erythema or discharge  Ears, Nose, Mouth, and Throat      External inspection of ears and nose: Normal        Oropharynx: Normal with no erythema, edema, exudate or lesions  Pulmonary      Auscultation of lungs: Clear to auscultation  Cardiovascular      Auscultation of heart: Normal rate and rhythm, normal S1 and S2, without murmurs  Examination of extremities for edema and/or varicosities: Normal        Abdomen      Abdomen: Non-tender, no masses  Liver and spleen: No hepatomegaly or splenomegaly  Lymphatic      Palpation of lymph nodes in neck: No lymphadenopathy  Musculoskeletal      Gait and station: Normal        Skin      Skin and subcutaneous tissue: Normal without rashes or lesions  Neurologic      Cranial nerves: Cranial nerves 2-12 intact  Psychiatric      Orientation to person, place, and time: Normal           Future Appointments      Date/Time Provider Specialty Site   01/23/2018 11:00 AM Velvet Goncalves DO Gastroenterology Adult [facility] OUTPATIENT     Signatures    Electronically signed by : FERNANDO Jimenez  Jan 5 2018  3:13PM EST                       (Author)

## 2018-01-10 NOTE — RESULT NOTES
Verified Results  (1) BASIC METABOLIC PROFILE 91ZYN1516 01:01PM [facility] [employer] Order Number: [id number]     Test Name Result Flag Reference   SODIUM 137 mmol/L  136-145   POTASSIUM 4 3 mmol/L  3 5-5 3   CHLORIDE 102 mmol/L  100-108   CARBON DIOXIDE 26 mmol/L  21-32   ANION GAP (CALC) 9 mmol/L  4-13   BLOOD UREA NITROGEN 12 mg/dL  5-25   CREATININE 0 77 mg/dL  0 60-1 30   Standardized to IDMS reference method   CALCIUM 9 5 mg/dL  8 3-10 1   eGFR Non-African American      >60 0 ml/min/1 73sq m   [facility] Disease Education Program recommendations are as follows:  GFR calculation is accurate only with a steady state creatinine  Chronic Kidney disease less than 60 ml/min/1 73 sq  meters  Kidney failure less than 15 ml/min/1 73 sq  meters  GLUCOSE FASTING 202 mg/dL H 65-99     (1) CBC/PLT/DIFF 30TLS8254 01:01PM [place] [employer] Order Number: [id number]     Test Name Result Flag Reference   WBC COUNT 16 85 Thousand/uL H 4 31-10 16   RBC COUNT 5 31 Million/uL H 3 81-5 12   HEMOGLOBIN 15 5 g/dL H 11 5-15 4   HEMATOCRIT 46 6 % H 34 8-46  1   MCV 88 fL  82-98   MCH 29 2 pg  26 8-34 3   MCHC 33 3 g/dL  31 4-37 4   RDW 13 5 %  11 6-15 1   MPV 10 9 fL  8 9-12 7   PLATELET COUNT 796 Thousands/uL  149-390   nRBC AUTOMATED 0 /100 WBCs     NEUTROPHILS RELATIVE PERCENT 70 %  43-75   LYMPHOCYTES RELATIVE PERCENT 22 %  14-44   MONOCYTES RELATIVE PERCENT 5 %  4-12   EOSINOPHILS RELATIVE PERCENT 2 %  0-6   BASOPHILS RELATIVE PERCENT 1 %  0-1   NEUTROPHILS ABSOLUTE COUNT 11 77 Thousands/? ??L H 1 85-7 62   LYMPHOCYTES ABSOLUTE COUNT 3 69 Thousands/? ??L  0 60-4 47   MONOCYTES ABSOLUTE COUNT 0 85 Thousand/? ??L  0 17-1 22   EOSINOPHILS ABSOLUTE COUNT 0 38 Thousand/? ??L  0 00-0 61   BASOPHILS ABSOLUTE COUNT 0 09 Thousands/? ??L  0 00-0 10       Plan  Leukocytosis    · 1 - Opal Acosta DO  (Medical Oncology) Co-Management  *  Status: Active  Requested  for: 02ERE7518  Care Summary provided  : Yes

## 2018-01-10 NOTE — RESULT NOTES
Verified Results  * XR CHEST PA & LATERAL 27Oct2017 09:36AM Yvette Velarde Order Number: [id number]     Test Name Result Flag Reference   XR CHEST PA & LATERAL (Report)     CHEST     INDICATION: Cough, fatigue  Tobacco abuse  COMPARISON: None     VIEWS: PA and lateral      IMAGES: 2     FINDINGS:     The cardiomediastinal silhouette is unremarkable  The lungs are clear  No pleural effusions  Mild thoracic levoscoliosis  Surgical clips are noted in the right upper quadrant of the abdomen  IMPRESSION:     No active pulmonary disease          Workstation performed: [id number]     Signed by:   Carmen Cavazos MD   10/30/17

## 2018-01-12 VITALS
BODY MASS INDEX: 30.5 KG/M2 | RESPIRATION RATE: 15 BRPM | WEIGHT: 172.13 LBS | SYSTOLIC BLOOD PRESSURE: 128 MMHG | TEMPERATURE: 97.8 F | OXYGEN SATURATION: 96 % | HEIGHT: 63 IN | HEART RATE: 89 BPM | DIASTOLIC BLOOD PRESSURE: 68 MMHG

## 2018-01-12 VITALS
DIASTOLIC BLOOD PRESSURE: 68 MMHG | WEIGHT: 173.5 LBS | SYSTOLIC BLOOD PRESSURE: 106 MMHG | BODY MASS INDEX: 30.74 KG/M2 | HEIGHT: 63 IN

## 2018-01-13 VITALS
DIASTOLIC BLOOD PRESSURE: 90 MMHG | WEIGHT: 177.5 LBS | BODY MASS INDEX: 31.45 KG/M2 | HEIGHT: 63 IN | SYSTOLIC BLOOD PRESSURE: 128 MMHG

## 2018-01-13 VITALS
WEIGHT: 172.38 LBS | TEMPERATURE: 96.6 F | HEART RATE: 88 BPM | HEIGHT: 63 IN | OXYGEN SATURATION: 98 % | BODY MASS INDEX: 30.54 KG/M2

## 2018-01-13 NOTE — RESULT NOTES
Verified Results  (1) HEMOGLOBIN A1C 83Kuq4486 11:10AM [facility] [place] Order Number: [id number]     Test Name Result Flag Reference   HEMOGLOBIN A1C 8 3 % H 4 2-6 3   EST  AVG   GLUCOSE 192 mg/dl         Plan  Type 2 diabetes mellitus with other kidney complication    · Januvia 100 MG Oral Tablet; TAKE 1 TABLET DAILY

## 2018-01-14 VITALS
SYSTOLIC BLOOD PRESSURE: 128 MMHG | BODY MASS INDEX: 30.83 KG/M2 | DIASTOLIC BLOOD PRESSURE: 80 MMHG | HEIGHT: 63 IN | WEIGHT: 174 LBS

## 2018-01-14 VITALS
HEIGHT: 63 IN | HEART RATE: 87 BPM | OXYGEN SATURATION: 98 % | DIASTOLIC BLOOD PRESSURE: 82 MMHG | TEMPERATURE: 97.8 F | SYSTOLIC BLOOD PRESSURE: 132 MMHG | RESPIRATION RATE: 15 BRPM | WEIGHT: 174.5 LBS | BODY MASS INDEX: 30.92 KG/M2

## 2018-01-14 VITALS
BODY MASS INDEX: 30.54 KG/M2 | HEIGHT: 63 IN | DIASTOLIC BLOOD PRESSURE: 71 MMHG | SYSTOLIC BLOOD PRESSURE: 108 MMHG | HEART RATE: 90 BPM | WEIGHT: 172.38 LBS

## 2018-01-14 NOTE — RESULT NOTES
Verified Results  * XR HEEL / CALCANEUS 2+ VIEW RIGHT 29SRJ8394 02:46PM [facility] Order Number: [id number]     Test Name Result Flag Reference   XR HEEL / CALCANEUS 2+ VW RIGHT (Report)     RIGHT HEEL     INDICATION: Right heel pain  COMPARISON: None     VIEWS: Axial and lateral     IMAGES: 2     FINDINGS:     There is no acute fracture or dislocation  No degenerative changes  No lytic or blastic lesions are seen  Soft tissues are unremarkable  IMPRESSION:     No acute osseous abnormality  Workstation performed: [id number]     Signed by:   Piper Rogers MD   5/9/17     (1) CBC/ PLT (NO DIFF) 57SON3776 02:46PM [employer] Order Number: [id number]     Test Name Result Flag Reference   HEMATOCRIT 47 3 % H 34 8-46 1   HEMOGLOBIN 16 1 g/dL H 11 5-15 4   MCHC 34 0 g/dL  31 4-37 4   MCH 30 0 pg  26 8-34 3   MCV 88 fL  82-98   PLATELET COUNT 326 Thousands/uL  149-390   RBC COUNT 5 37 Million/uL H 3 81-5 12   RDW 13 2 %  11 6-15 1   WBC COUNT 13 74 Thousand/uL H 4 31-10 16   MPV 11 2 fL  8 9-12 7     (1) COMPREHENSIVE METABOLIC PANEL 41THC1456 16:39MP [employer] Order Number: [id number]     Test Name Result Flag Reference   SODIUM 135 mmol/L L 136-145   POTASSIUM 4 0 mmol/L  3 5-5 3   CHLORIDE 104 mmol/L  100-108   CARBON DIOXIDE 26 mmol/L  21-32   ANION GAP (CALC) 5 mmol/L  4-13   BLOOD UREA NITROGEN 12 mg/dL  5-25   CREATININE 0 71 mg/dL  0 60-1 30   Standardized to IDMS reference method   CALCIUM 8 6 mg/dL  8 3-10 1   BILI, TOTAL 0 68 mg/dL  0 20-1 00   ALK PHOSPHATAS 112 U/L     ALT (SGPT) 26 U/L  12-78   AST(SGOT) 10 U/L  5-45   ALBUMIN 3 2 g/dL L 3 5-5 0   TOTAL PROTEIN 7 2 g/dL  6 4-8 2   eGFR Non-African American      >60 0 ml/min/1 73sq [facility] Disease Education Program recommendations are as follows:  GFR calculation is accurate only with a steady state creatinine  Chronic Kidney disease less than 60 ml/min/1 73 sq  meters  Kidney failure less than 15 ml/min/1 73 sq  meters  GLUCOSE FASTING 271 mg/dL H 65-99     (1) LIPID PANEL FASTING W DIRECT LDL REFLEX [id number] 02:46PM [employer] Order Number: [id number]     Test Name Result Flag Reference   CHOLESTEROL 236 mg/dL H    LDL CHOLESTEROL CALCULATED 152 mg/dL H 0-100   Triglyceride:         Normal              <150 mg/dl       Borderline High    150-199 mg/dl       High               200-499 mg/dl       Very High          >499 mg/dl  Cholesterol:         Desirable        <200 mg/dl      Borderline High  200-239 mg/dl      High             >239 mg/dl  HDL Cholesterol:        High    >59 mg/dL      Low     <41 mg/dL  LDL Cholesterol:        Optimal          <100 mg/dl        Near Optimal     100-129 mg/dl        Above Optimal          Borderline High   130-159 mg/dl          High              160-189 mg/dl          Very High        >189 mg/dl  LDL CALCULATED:    This screening LDL is a calculated result  It does not have the accuracy of the Direct Measured LDL in the monitoring of patients with hyperlipidemia and/or statin therapy  Direct Measure LDL (LHY915) must be ordered separately in these patients  TRIGLYCERIDES 216 mg/dL H <=150   Specimen collection should occur prior to N-Acetylcysteine or Metamizole administration due to the potential for falsely depressed results  HDL,DIRECT 41 mg/dL  40-60   Specimen collection should occur prior to Metamizole administration due to the potential for falsely depressed results  (1) HEMOGLOBIN A1C 33OWM1408 02:46PM [employer] Order Number: [id number]     Test Name Result Flag Reference   HEMOGLOBIN A1C 11 5 % H 4 2-6 3   EST  AVG   GLUCOSE 283 mg/dl       (1) MICROALBUMIN CREATININE RATIO, RANDOM URINE [id number] 02:46PM [employer] Order Number: [id number]     Test Name Result Flag Reference   MICROALBUMIN/ CREAT R 8 mg/g creatinine  0-30   MICROALBUMIN,URINE 10 0 mg/L 0  0-20 0   CREATININE URINE 133 0 mg/dL         Plan  Leukocytosis    · (1) CBC/PLT/DIFF; Status:Active;  Requested for:69Fxr8063;   Mixed hyperlipidemia    · Simvastatin 20 MG Oral Tablet; TAKE 1 TABLET AT BEDTIME  Type 2 diabetes mellitus with other kidney complication    · MetFORMIN HCl - 500 MG Oral Tablet; 1 TABLET Bid

## 2018-01-15 VITALS
TEMPERATURE: 97.9 F | HEIGHT: 63 IN | SYSTOLIC BLOOD PRESSURE: 124 MMHG | BODY MASS INDEX: 31.18 KG/M2 | WEIGHT: 176 LBS | DIASTOLIC BLOOD PRESSURE: 66 MMHG

## 2018-01-16 NOTE — RESULT NOTES
Verified Results  * CT ABDOMEN PELVIS W CONTRAST 21Jun2017 01:21PM Leslie Restrepo Order Number: [id number]    - Patient Instructions: To schedule this appointment, please contact Central Scheduling at [phone]  Test Name Result Flag Reference   CT ABDOMEN PELVIS W CONTRAST (Report)     CT ABDOMEN AND PELVIS WITH IV CONTRAST     INDICATION: Lower abdominal pain  COMPARISON: None  TECHNIQUE: CT examination of the abdomen and pelvis was performed  Reformatted images were created in axial, sagittal, and coronal planes  Radiation dose length product (DLP) for this visit: 948 54 mGy-cm   This examination, like all CT scans performed in the [facility], was performed utilizing techniques to minimize radiation dose exposure, including the use of iterative   reconstruction and automated exposure control  IV Contrast: 100 mL of iohexol (OMNIPAQUE)      Enteric Contrast: Enteric contrast was administered  FINDINGS:     ABDOMEN     LOWER CHEST: No significant abnormalities identified in the lower chest  Small hiatal hernia noted  LIVER/BILIARY TREE: Liver is diffusely decreased in density consistent with fatty change  No CT evidence of suspicious hepatic mass  Normal hepatic contours  No biliary dilatation  GALLBLADDER: Gallbladder is surgically absent  SPLEEN: Unremarkable  PANCREAS: Unremarkable  ADRENAL GLANDS: Unremarkable  KIDNEYS/URETERS: One or more simple renal cyst(s) is noted  Otherwise unremarkable kidneys  No hydronephrosis  STOMACH AND BOWEL: Scattered diverticulosis with acute diverticulitis or bowel obstruction  There is under distention of the colon with no pericolonic infiltration to suggest colitis  No small bowel obstruction  APPENDIX: The appendix is surgically absent  ABDOMINOPELVIC CAVITY: No ascites or free intraperitoneal air  No lymphadenopathy  VESSELS: Atherosclerotic changes are present   No evidence of aneurysm  PELVIS     REPRODUCTIVE ORGANS: Patient is status post hysterectomy  URINARY BLADDER: Unremarkable  ABDOMINAL WALL/INGUINAL REGIONS: Unremarkable  OSSEOUS STRUCTURES: No acute fracture or destructive osseous lesion  IMPRESSION:       1  No acute abnormality the abdomen or pelvis  2  Hepatic steatosis  3  Scattered diverticulosis with under distention of the colon  No acute diverticulitis, bowel obstruction, or colitis         Workstation performed: [id number]     Signed by:   Gagandeep Jones MD   6/23/17

## 2018-01-22 ENCOUNTER — ANESTHESIA EVENT (OUTPATIENT)
Dept: PERIOP | Facility: HOSPITAL | Age: 53
End: 2018-01-22
Payer: COMMERCIAL

## 2018-01-23 ENCOUNTER — HOSPITAL ENCOUNTER (OUTPATIENT)
Facility: HOSPITAL | Age: 53
Setting detail: OUTPATIENT SURGERY
Discharge: HOME | End: 2018-01-23
Attending: INTERNAL MEDICINE | Admitting: INTERNAL MEDICINE
Payer: COMMERCIAL

## 2018-01-23 ENCOUNTER — GENERIC CONVERSION - ENCOUNTER (OUTPATIENT)
Dept: OTHER | Facility: OTHER | Age: 53
End: 2018-01-23

## 2018-01-23 ENCOUNTER — ANESTHESIA (OUTPATIENT)
Dept: PERIOP | Facility: HOSPITAL | Age: 53
End: 2018-01-23
Payer: COMMERCIAL

## 2018-01-23 VITALS
OXYGEN SATURATION: 96 % | HEIGHT: 63 IN | BODY MASS INDEX: 31.27 KG/M2 | DIASTOLIC BLOOD PRESSURE: 76 MMHG | HEART RATE: 103 BPM | TEMPERATURE: 97.7 F | RESPIRATION RATE: 16 BRPM | WEIGHT: 176.5 LBS | SYSTOLIC BLOOD PRESSURE: 124 MMHG

## 2018-01-23 VITALS
SYSTOLIC BLOOD PRESSURE: 132 MMHG | HEART RATE: 67 BPM | DIASTOLIC BLOOD PRESSURE: 67 MMHG | OXYGEN SATURATION: 99 % | TEMPERATURE: 97.1 F | RESPIRATION RATE: 18 BRPM

## 2018-01-23 LAB — GLUCOSE SERPL-MCNC: 176 MG/DL (ref 65–140)

## 2018-01-23 PROCEDURE — 82948 REAGENT STRIP/BLOOD GLUCOSE: CPT

## 2018-01-23 RX ORDER — ASPIRIN 81 MG/1
81 TABLET ORAL DAILY
COMMUNITY

## 2018-01-23 RX ORDER — ONDANSETRON 2 MG/ML
4 INJECTION INTRAMUSCULAR; INTRAVENOUS ONCE AS NEEDED
Status: DISCONTINUED | OUTPATIENT
Start: 2018-01-23 | End: 2018-01-23 | Stop reason: HOSPADM

## 2018-01-23 RX ORDER — SODIUM CHLORIDE, SODIUM LACTATE, POTASSIUM CHLORIDE, CALCIUM CHLORIDE 600; 310; 30; 20 MG/100ML; MG/100ML; MG/100ML; MG/100ML
125 INJECTION, SOLUTION INTRAVENOUS CONTINUOUS
Status: DISCONTINUED | OUTPATIENT
Start: 2018-01-23 | End: 2018-01-23

## 2018-01-23 RX ORDER — PROPOFOL 10 MG/ML
INJECTION, EMULSION INTRAVENOUS AS NEEDED
Status: DISCONTINUED | OUTPATIENT
Start: 2018-01-23 | End: 2018-01-23 | Stop reason: SURG

## 2018-01-23 RX ADMIN — PROPOFOL 50 MG: 10 INJECTION, EMULSION INTRAVENOUS at 09:49

## 2018-01-23 RX ADMIN — PROPOFOL 50 MG: 10 INJECTION, EMULSION INTRAVENOUS at 09:44

## 2018-01-23 RX ADMIN — PROPOFOL 50 MG: 10 INJECTION, EMULSION INTRAVENOUS at 09:41

## 2018-01-23 RX ADMIN — PROPOFOL 50 MG: 10 INJECTION, EMULSION INTRAVENOUS at 09:40

## 2018-01-23 RX ADMIN — LIDOCAINE HYDROCHLORIDE 20 MG: 20 INJECTION, SOLUTION INTRAVENOUS at 09:40

## 2018-01-23 RX ADMIN — SODIUM CHLORIDE, SODIUM LACTATE, POTASSIUM CHLORIDE, AND CALCIUM CHLORIDE 125 ML/HR: .6; .31; .03; .02 INJECTION, SOLUTION INTRAVENOUS at 08:43

## 2018-01-23 RX ADMIN — PROPOFOL 50 MG: 10 INJECTION, EMULSION INTRAVENOUS at 09:46

## 2018-01-23 RX ADMIN — PROPOFOL 50 MG: 10 INJECTION, EMULSION INTRAVENOUS at 09:52

## 2018-01-23 NOTE — ANESTHESIA PREPROCEDURE EVALUATION
Review of Systems/Medical History  Patient summary reviewed  Chart reviewed  No history of anesthetic complications     Cardiovascular  EKG reviewed, Hyperlipidemia, Hypertension ,    Pulmonary  Smoker ,        GI/Hepatic            Endo/Other  Diabetes Oral agent,      GYN       Hematology   Musculoskeletal       Neurology   Psychology           Physical Exam    Airway    Mallampati score: II  TM Distance: >3 FB  Neck ROM: full     Dental       Cardiovascular      Pulmonary      Other Findings        Anesthesia Plan  ASA Score- 2     Anesthesia Type- IV sedation with anesthesia with ASA Monitors  Additional Monitors:   Airway Plan:         Plan Factors-    Induction- intravenous  Postoperative Plan-     Informed Consent- Anesthetic plan and risks discussed with patient  I personally reviewed this patient with the CRNA  Discussed and agreed on the Anesthesia Plan with the CRNA  Hayes Wang

## 2018-01-23 NOTE — H&P
History and Physical - [facility]  Kimberley Aguilar 46 y o  female MRN: [id number]                  HPI: Kimberley Aguilar is a 46y o  year old female who presents for rectal bleeding  REVIEW OF SYSTEMS: Per the HPI, and otherwise unremarkable  Historical Information   Past Medical History:   Diagnosis Date    Diabetes mellitus ([facility] [address] )     Hyperlipidemia     Hypertension      Past Surgical History:   Procedure Laterality Date    APPENDECTOMY      BLADDER AUGMENTATION      CHOLECYSTECTOMY      HERNIA REPAIR      HYSTERECTOMY      KNEE ARTHROPLASTY      KNEE ARTHROSCOPY Bilateral     REDUCTION MAMMAPLASTY      SHOULDER SURGERY      WISDOM TOOTH EXTRACTION       Social History   History   Alcohol Use No     History   Drug Use No     History   Smoking Status    Current Every Day Smoker    Types: Cigarettes   Smokeless Tobacco    Not on file     No family history on file  Meds/Allergies     Prescriptions Prior to Admission   Medication    aspirin (ECOTRIN LOW STRENGTH) 81 mg EC tablet    fluticasone (FLONASE) 50 mcg/act nasal spray    metFORMIN (GLUCOPHAGE) 500 mg tablet    simvastatin (ZOCOR) 20 mg tablet    sitaGLIPtin (JANUVIA) 100 mg tablet    LOSARTAN POTASSIUM PO       Allergies   Allergen Reactions    Clomid [Clomiphene] Hives    Latex Blisters       Objective     Blood pressure 129/60, pulse 90, temperature (!) 96 3 °F (35 7 °C), temperature source Tympanic, resp  rate 18, SpO2 96 %  PHYSICAL EXAM    Gen: NAD  CV: RRR  CHEST: Clear  ABD: soft, NT/ND  EXT: no edema  Neuro: AAO      ASSESSMENT/PLAN:  This is a 46y o  year old female here for rectal bleeding      PLAN: colonoscopy

## 2018-01-23 NOTE — OP NOTE
**** GI/ENDOSCOPY REPORT ****     PATIENT NAME: Alfredito Bell ------ VISIT ID:  Patient ID: [id number]   YOB: 1965     INTRODUCTION: Colonoscopy - A 46 female patient presents for an outpatient   Colonoscopy at [facility]  PREVIOUS COLONOSCOPY: No prior colonoscopy  INDICATIONS: Rectal bleeding  CONSENT:  The benefits, risks, and alternatives to the procedure were   discussed and informed consent was obtained from the patient  PREPARATION: EKG, pulse, pulse oximetry and blood pressure were monitored   throughout the procedure  The patient was identified by myself both   verbally and by visual inspection of ID band  ASA Classification: Class 2   - Patient has mild to moderate systemic disturbance that may or may not be   related to the disorder requiring surgery  Airway Assessment   Classification: Airway class 2 - Visualization of the soft palate, fauces   and uvula  MEDICATIONS: Anesthesia-check records     PROCEDURE:  The endoscope was passed without difficulty through the anus   under direct visualization and advanced to the cecum, confirmed by   ileocecal valve and appendiceal orifice  The scope was withdrawn and the   mucosa was carefully examined  The quality of the preparation was good  RECTAL EXAM: Normal rectal exam      FINDINGS:   There was evidence of diverticulosis in the sigmoid colon  Internal hemorrhoids were found  Otherwise, the colon appeared to be   normal      COMPLICATIONS: There were no complications  IMPRESSIONS: Diverticulosis found in the sigmoid colon  Internal   hemorrhoids  RECOMMENDATIONS: Colonoscopy recommended in 10 years       PATHOLOGY SPECIMENS:     ESTIMATED BLOOD LOSS:     PROCEDURE CODES:     ICD-9 Codes: 562 10 Diverticulosis of colon (without mention of hemorrhage)     ICD-10 Codes: K57 Diverticular disease of intestine K64 9 Unspecified   hemorrhoids     PERFORMED BY: YANNI Guillermo  on 01/23/2018  Version 1, electronically signed by YANNI Villegas  on   01/23/2018 at 09:57

## 2018-01-23 NOTE — ANESTHESIA POSTPROCEDURE EVALUATION
Post-Op Assessment Note      CV Status:  Stable    Mental Status:  Somnolent    Hydration Status:  Stable    PONV Controlled:  None    Airway Patency:  Patent    Post Op Vitals Reviewed: Yes          Staff: CRNA           BP   111/52   Temp  97 1   Pulse  81   Resp   17   SpO2   98%

## 2018-01-24 ENCOUNTER — TELEPHONE (OUTPATIENT)
Dept: FAMILY MEDICINE CLINIC | Facility: CLINIC | Age: 53
End: 2018-01-24

## 2018-01-24 DIAGNOSIS — E11.9 TYPE 2 DIABETES MELLITUS WITHOUT COMPLICATION, WITHOUT LONG-TERM CURRENT USE OF INSULIN (HCC): Primary | ICD-10-CM

## 2018-01-24 DIAGNOSIS — E11.9 TYPE 2 DIABETES MELLITUS WITHOUT COMPLICATION, WITHOUT LONG-TERM CURRENT USE OF INSULIN (HCC): ICD-10-CM

## 2018-01-24 DIAGNOSIS — E78.2 MIXED HYPERLIPIDEMIA: Primary | ICD-10-CM

## 2018-01-24 RX ORDER — SITAGLIPTIN 100 MG/1
TABLET, FILM COATED ORAL
Qty: 30 TABLET | Refills: 0 | Status: SHIPPED | OUTPATIENT
Start: 2018-01-24 | End: 2018-01-24 | Stop reason: SDUPTHER

## 2018-01-25 RX ORDER — SIMVASTATIN 20 MG
20 TABLET ORAL
Qty: 90 TABLET | Refills: 0 | Status: SHIPPED | OUTPATIENT
Start: 2018-01-25 | End: 2019-03-27 | Stop reason: SDUPTHER

## 2018-01-26 DIAGNOSIS — D75.1 SECONDARY POLYCYTHEMIA: ICD-10-CM

## 2018-01-29 ENCOUNTER — GENERIC CONVERSION - ENCOUNTER (OUTPATIENT)
Dept: OTHER | Facility: OTHER | Age: 53
End: 2018-01-29

## 2018-02-08 RX ORDER — FLUTICASONE PROPIONATE 50 MCG
2 SPRAY, SUSPENSION (ML) NASAL DAILY
COMMUNITY
Start: 2014-05-06 | End: 2018-09-11 | Stop reason: SDUPTHER

## 2018-02-08 RX ORDER — SIMVASTATIN 20 MG
1 TABLET ORAL
COMMUNITY
Start: 2017-05-11 | End: 2018-09-11 | Stop reason: SDUPTHER

## 2018-02-08 RX ORDER — TRAMADOL HYDROCHLORIDE 50 MG/1
1 TABLET ORAL EVERY 6 HOURS PRN
COMMUNITY
Start: 2017-08-15 | End: 2018-09-11 | Stop reason: ALTCHOICE

## 2018-02-08 RX ORDER — LOSARTAN POTASSIUM 25 MG/1
1 TABLET ORAL DAILY
COMMUNITY
Start: 2017-07-31 | End: 2019-03-27 | Stop reason: SDUPTHER

## 2018-03-29 ENCOUNTER — TELEPHONE (OUTPATIENT)
Dept: FAMILY MEDICINE CLINIC | Facility: CLINIC | Age: 53
End: 2018-03-29

## 2018-03-29 DIAGNOSIS — J06.9 UPPER RESPIRATORY TRACT INFECTION, UNSPECIFIED TYPE: Primary | ICD-10-CM

## 2018-03-29 RX ORDER — AMOXICILLIN 500 MG/1
CAPSULE ORAL
Qty: 40 CAPSULE | Refills: 0 | Status: SHIPPED | OUTPATIENT
Start: 2018-03-29 | End: 2018-04-09

## 2018-03-29 NOTE — TELEPHONE ENCOUNTER
I put in for amoxicillin  Push fluids    Call symptoms continue or increase
Patient aware
Please rx for sinus infection
Ambulatory

## 2018-04-12 ENCOUNTER — TELEPHONE (OUTPATIENT)
Dept: FAMILY MEDICINE CLINIC | Facility: CLINIC | Age: 53
End: 2018-04-12

## 2018-04-12 NOTE — TELEPHONE ENCOUNTER
She is done her antibiotics   she is still coughing up yellow green   she is drinking lots of fluids but when she takes mucinex it drys her right out

## 2018-05-01 DIAGNOSIS — D72.829 ELEVATED WHITE BLOOD CELL COUNT: ICD-10-CM

## 2018-07-27 ENCOUNTER — APPOINTMENT (OUTPATIENT)
Dept: LAB | Facility: HOSPITAL | Age: 53
End: 2018-07-27
Attending: INTERNAL MEDICINE
Payer: COMMERCIAL

## 2018-07-27 DIAGNOSIS — D72.828 OTHER ELEVATED WHITE BLOOD CELL (WBC) COUNT: Primary | ICD-10-CM

## 2018-07-27 LAB
ALBUMIN SERPL BCP-MCNC: 3.5 G/DL (ref 3.5–5)
ALP SERPL-CCNC: 117 U/L (ref 46–116)
ALT SERPL W P-5'-P-CCNC: 31 U/L (ref 12–78)
ANION GAP SERPL CALCULATED.3IONS-SCNC: 10 MMOL/L (ref 4–13)
AST SERPL W P-5'-P-CCNC: 18 U/L (ref 5–45)
BASOPHILS # BLD MANUAL: 0 THOUSAND/UL (ref 0–0.1)
BASOPHILS NFR MAR MANUAL: 0 % (ref 0–1)
BILIRUB SERPL-MCNC: 0.5 MG/DL (ref 0.2–1)
BUN SERPL-MCNC: 13 MG/DL (ref 5–25)
CALCIUM SERPL-MCNC: 9.5 MG/DL (ref 8.3–10.1)
CHLORIDE SERPL-SCNC: 101 MMOL/L (ref 100–108)
CO2 SERPL-SCNC: 27 MMOL/L (ref 21–32)
CREAT SERPL-MCNC: 0.81 MG/DL (ref 0.6–1.3)
EOSINOPHIL # BLD MANUAL: 0 THOUSAND/UL (ref 0–0.4)
EOSINOPHIL NFR BLD MANUAL: 0 % (ref 0–6)
ERYTHROCYTE [DISTWIDTH] IN BLOOD BY AUTOMATED COUNT: 13.5 % (ref 11.6–15.1)
GFR SERPL CREATININE-BSD FRML MDRD: 84 ML/MIN/1.73SQ M
GLUCOSE SERPL-MCNC: 259 MG/DL (ref 65–140)
HCT VFR BLD AUTO: 48.1 % (ref 34.8–46.1)
HGB BLD-MCNC: 16.4 G/DL (ref 11.5–15.4)
LYMPHOCYTES # BLD AUTO: 18 % (ref 14–44)
LYMPHOCYTES # BLD AUTO: 2.47 THOUSAND/UL (ref 0.6–4.47)
MCH RBC QN AUTO: 29.2 PG (ref 26.8–34.3)
MCHC RBC AUTO-ENTMCNC: 34.1 G/DL (ref 31.4–37.4)
MCV RBC AUTO: 86 FL (ref 82–98)
MONOCYTES # BLD AUTO: 0.27 THOUSAND/UL (ref 0–1.22)
MONOCYTES NFR BLD: 2 % (ref 4–12)
NEUTROPHILS # BLD MANUAL: 10.96 THOUSAND/UL (ref 1.85–7.62)
NEUTS SEG NFR BLD AUTO: 80 % (ref 43–75)
PLATELET # BLD AUTO: 341 THOUSANDS/UL (ref 149–390)
PLATELET BLD QL SMEAR: ADEQUATE
PMV BLD AUTO: 10.3 FL (ref 8.9–12.7)
POTASSIUM SERPL-SCNC: 4.6 MMOL/L (ref 3.5–5.3)
PROT SERPL-MCNC: 7.5 G/DL (ref 6.4–8.2)
RBC # BLD AUTO: 5.62 MILLION/UL (ref 3.81–5.12)
SODIUM SERPL-SCNC: 138 MMOL/L (ref 136–145)
TOTAL CELLS COUNTED SPEC: 100
WBC # BLD AUTO: 13.7 THOUSAND/UL (ref 4.31–10.16)

## 2018-07-27 PROCEDURE — 80053 COMPREHEN METABOLIC PANEL: CPT

## 2018-07-27 PROCEDURE — 36415 COLL VENOUS BLD VENIPUNCTURE: CPT

## 2018-07-27 PROCEDURE — 85007 BL SMEAR W/DIFF WBC COUNT: CPT

## 2018-07-27 PROCEDURE — 85027 COMPLETE CBC AUTOMATED: CPT

## 2018-08-21 ENCOUNTER — TELEPHONE (OUTPATIENT)
Dept: FAMILY MEDICINE CLINIC | Facility: CLINIC | Age: 53
End: 2018-08-21

## 2018-08-21 DIAGNOSIS — R30.0 DYSURIA: Primary | ICD-10-CM

## 2018-08-21 RX ORDER — NITROFURANTOIN 25; 75 MG/1; MG/1
100 CAPSULE ORAL 2 TIMES DAILY
Qty: 10 CAPSULE | Refills: 0 | Status: SHIPPED | OUTPATIENT
Start: 2018-08-21 | End: 2018-08-26

## 2018-09-11 ENCOUNTER — OFFICE VISIT (OUTPATIENT)
Dept: HEMATOLOGY ONCOLOGY | Facility: HOSPITAL | Age: 53
End: 2018-09-11
Payer: COMMERCIAL

## 2018-09-11 VITALS
WEIGHT: 169.7 LBS | TEMPERATURE: 98.3 F | SYSTOLIC BLOOD PRESSURE: 142 MMHG | HEIGHT: 63 IN | OXYGEN SATURATION: 96 % | RESPIRATION RATE: 17 BRPM | DIASTOLIC BLOOD PRESSURE: 84 MMHG | BODY MASS INDEX: 30.07 KG/M2 | HEART RATE: 90 BPM

## 2018-09-11 DIAGNOSIS — D45 POLYCYTHEMIA VERA (HCC): Primary | ICD-10-CM

## 2018-09-11 PROCEDURE — 99215 OFFICE O/P EST HI 40 MIN: CPT | Performed by: INTERNAL MEDICINE

## 2018-09-11 RX ORDER — HYDROXYUREA 500 MG/1
500 CAPSULE ORAL DAILY
Qty: 30 CAPSULE | Refills: 5 | Status: SHIPPED | OUTPATIENT
Start: 2018-09-11 | End: 2019-03-12 | Stop reason: SDUPTHER

## 2018-09-11 NOTE — PROGRESS NOTES
[facility] [address]  [place] [id number] [phone]  [address] LANG Kay,1965, [id number]  09/11/18    Discussion:   In summary, this is a 40-year-old female history of borderline fluctuating erythrocytosis as well as leukocytosis  Previous JAK2 mutation was negative  [place] chromosome likewise  She reports that she continues to feel quite poorly with decreased energy, fatigue, etc   We reviewed that it is possible that she has polycythemia as 10-20% of cases can have negative JAK2 mutation  Empiric therapy for polycythemia is considered  I would favor Hydrea 500 mg p o  daily  We did discuss the possibility of phlebotomy as an alternative  The patient is inclined to proceed with Hydrea based on convenience  I would check a CBC Q 2 weeks and adjust Hydrea accordingly  More importantly, we will assess impact on her fatigue to help determine whether this is a contributor or not  If it is not, other potential etiologies should be sought  I discussed the above with the patient  The patient  voiced understanding and agreement   ______________________________________________________________________    Chief Complaint   Patient presents with    Follow-up       HPI:   No history exists  Interval History:   clinically stable  0 - Asymptomatic    Review of Systems   Constitutional: Negative for appetite change, diaphoresis, fatigue and fever  HENT: Negative for sinus pain  Eyes: Negative for discharge  Respiratory: Negative for cough and shortness of breath  Cardiovascular: Negative for chest pain  Gastrointestinal: Negative for abdominal pain, constipation and diarrhea  Endocrine: Negative for cold intolerance  Genitourinary: Negative for difficulty urinating and hematuria  Musculoskeletal: Negative for joint swelling  Skin: Negative for rash  Allergic/Immunologic: Negative for environmental allergies  Neurological: Negative for dizziness and headaches  Hematological: Negative for adenopathy  Psychiatric/Behavioral: Negative for agitation  Past Medical History:   Diagnosis Date    Diabetes mellitus ([address] )     Hyperlipidemia     Hypertension      There is no problem list on file for this patient  Current Outpatient Prescriptions:     aspirin (ECOTRIN LOW STRENGTH) 81 mg EC tablet, Take 81 mg by mouth daily, Disp: , Rfl:     fluticasone (FLONASE) 50 mcg/act nasal spray, 2 sprays into each nostril daily, Disp: , Rfl:     losartan (COZAAR) 25 mg tablet, Take 1 tablet by mouth daily, Disp: , Rfl:     metFORMIN (GLUCOPHAGE) 1000 MG tablet, Take 1 tablet by mouth 2 (two) times a day, Disp: , Rfl:     simvastatin (ZOCOR) 20 mg tablet, Take 1 tablet by mouth daily at bedtime, Disp: 90 tablet, Rfl: 0    sitaGLIPtin (JANUVIA) 100 mg tablet, Take 1 tablet by mouth daily, Disp: 30 tablet, Rfl: 0  Allergies   Allergen Reactions    Clomid [Clomiphene] Hives    Latex Blisters     Past Surgical History:   Procedure Laterality Date    APPENDECTOMY      BLADDER AUGMENTATION      CHOLECYSTECTOMY      HERNIA REPAIR      HYSTERECTOMY      KNEE ARTHROPLASTY      KNEE ARTHROSCOPY Bilateral     [place] COLONOSCOPY FLX DX W/COLLJ SPEC WHEN PFRMD N/A 1/23/2018    Procedure: COLONOSCOPY;  Surgeon: Radha Vernon DO;  Location: [facility];  Service: Gastroenterology    REDUCTION MAMMAPLASTY      SHOULDER SURGERY      WISDOM TOOTH EXTRACTION       Social History     Objective:  Vitals:    09/11/18 1503   BP: 142/84   BP Location: Left arm   Patient Position: Sitting   Pulse: 90   Resp: 17   Temp: 98 3 °F (36 8 °C)   TempSrc: Tympanic   SpO2: 96%   Weight: 77 kg (169 lb 11 2 oz)   Height: 5' 3" (1 6 m)     Physical Exam   Constitutional: She is oriented to person, place, and time  She appears well-developed  HENT:   Head: Normocephalic  Eyes: Pupils are equal, round, and reactive to light     Neck: Neck supple  Cardiovascular: Normal rate  No murmur heard  Pulmonary/Chest: No respiratory distress  She has no wheezes  She has no rales  Abdominal: Soft  She exhibits no distension  There is no tenderness  There is no rebound  Musculoskeletal: She exhibits no edema  Lymphadenopathy:     She has no cervical adenopathy  Neurological: She is alert and oriented to person, place, and time  She displays normal reflexes  Skin: Skin is warm  No rash noted  Psychiatric: She has a normal mood and affect  Thought content normal          Labs: I personally reviewed the labs and imaging pertinent to this patient care

## 2018-09-27 ENCOUNTER — APPOINTMENT (OUTPATIENT)
Dept: LAB | Facility: MEDICAL CENTER | Age: 53
End: 2018-09-27
Payer: COMMERCIAL

## 2018-11-07 ENCOUNTER — APPOINTMENT (OUTPATIENT)
Dept: LAB | Facility: HOSPITAL | Age: 53
End: 2018-11-07
Attending: INTERNAL MEDICINE
Payer: COMMERCIAL

## 2019-01-07 ENCOUNTER — TELEPHONE (OUTPATIENT)
Dept: FAMILY MEDICINE CLINIC | Facility: CLINIC | Age: 54
End: 2019-01-07

## 2019-01-07 DIAGNOSIS — J01.90 ACUTE SINUSITIS, RECURRENCE NOT SPECIFIED, UNSPECIFIED LOCATION: Primary | ICD-10-CM

## 2019-01-07 RX ORDER — AMOXICILLIN AND CLAVULANATE POTASSIUM 875; 125 MG/1; MG/1
1 TABLET, FILM COATED ORAL EVERY 12 HOURS SCHEDULED
Qty: 14 TABLET | Refills: 0 | Status: SHIPPED | OUTPATIENT
Start: 2019-01-07 | End: 2019-01-14

## 2019-02-19 ENCOUNTER — TELEPHONE (OUTPATIENT)
Dept: FAMILY MEDICINE CLINIC | Facility: CLINIC | Age: 54
End: 2019-02-19

## 2019-02-19 DIAGNOSIS — J06.9 UPPER RESPIRATORY TRACT INFECTION, UNSPECIFIED TYPE: Primary | ICD-10-CM

## 2019-02-19 RX ORDER — AMOXICILLIN 500 MG/1
CAPSULE ORAL
Qty: 40 CAPSULE | Refills: 0 | Status: SHIPPED | OUTPATIENT
Start: 2019-02-19 | End: 2019-03-01

## 2019-02-19 NOTE — TELEPHONE ENCOUNTER
I put in for amoxicillin  Push fluids  Call us if symptoms continue increase    Needs to make a regular appointment

## 2019-02-23 ENCOUNTER — OFFICE VISIT (OUTPATIENT)
Dept: URGENT CARE | Facility: CLINIC | Age: 54
End: 2019-02-23
Payer: COMMERCIAL

## 2019-02-23 VITALS
TEMPERATURE: 98.6 F | HEIGHT: 63 IN | RESPIRATION RATE: 20 BRPM | SYSTOLIC BLOOD PRESSURE: 151 MMHG | WEIGHT: 168 LBS | BODY MASS INDEX: 29.77 KG/M2 | OXYGEN SATURATION: 97 % | DIASTOLIC BLOOD PRESSURE: 72 MMHG | HEART RATE: 88 BPM

## 2019-02-23 DIAGNOSIS — R05.9 COUGH: Primary | ICD-10-CM

## 2019-02-23 PROCEDURE — 99283 EMERGENCY DEPT VISIT LOW MDM: CPT | Performed by: PHYSICIAN ASSISTANT

## 2019-02-23 PROCEDURE — G0382 LEV 3 HOSP TYPE B ED VISIT: HCPCS | Performed by: PHYSICIAN ASSISTANT

## 2019-02-23 PROCEDURE — 99203 OFFICE O/P NEW LOW 30 MIN: CPT | Performed by: PHYSICIAN ASSISTANT

## 2019-02-23 RX ORDER — BENZONATATE 100 MG/1
100 CAPSULE ORAL 3 TIMES DAILY PRN
Qty: 30 CAPSULE | Refills: 0 | Status: SHIPPED | OUTPATIENT
Start: 2019-02-23 | End: 2019-03-26

## 2019-02-23 RX ORDER — METHYLPREDNISOLONE 4 MG/1
TABLET ORAL
Qty: 1 EACH | Refills: 0 | Status: SHIPPED | OUTPATIENT
Start: 2019-02-23 | End: 2019-03-16

## 2019-02-23 NOTE — PROGRESS NOTES
[facility]        NAME: Sherren Bolt is a 48 y o  female  : 1965    MRN: [id number]  DATE: 2019  TIME: 9:30 AM    Assessment and Plan   Cough [R05]  1  Cough  benzonatate (TESSALON PERLES) 100 mg capsule    methylPREDNISolone 4 MG tablet therapy pack         Patient Instructions     Recommend medrol pack for costochondritis and tessalon perles for cough  Cont abx as rx'd by family dr   Follow up with PCP in 3-5 days  Proceed to  ER if symptoms worsen  Chief Complaint     Chief Complaint   Patient presents with    Cold Like Symptoms    Cough     greenish yellow mucus Dr Abel Robertson started patient on Amoxicillin 2 days ago         History of Present Illness       HPI    Review of Systems   Review of Systems   All other systems reviewed and are negative          Current Medications       Current Outpatient Medications:     amoxicillin (AMOXIL) 500 mg capsule, 2 capsules po twice a day for 10 days, Disp: 40 capsule, Rfl: 0    aspirin (ECOTRIN LOW STRENGTH) 81 mg EC tablet, Take 81 mg by mouth daily, Disp: , Rfl:     fluticasone (FLONASE) 50 mcg/act nasal spray, 2 sprays into each nostril daily, Disp: , Rfl:     hydroxyurea (HYDREA) 500 mg capsule, Take 1 capsule (500 mg total) by mouth daily, Disp: 30 capsule, Rfl: 5    losartan (COZAAR) 25 mg tablet, Take 1 tablet by mouth daily, Disp: , Rfl:     metFORMIN (GLUCOPHAGE) 1000 MG tablet, Take 1 tablet by mouth 2 (two) times a day, Disp: , Rfl:     simvastatin (ZOCOR) 20 mg tablet, Take 1 tablet by mouth daily at bedtime, Disp: 90 tablet, Rfl: 0    sitaGLIPtin (JANUVIA) 100 mg tablet, Take 1 tablet by mouth daily, Disp: 30 tablet, Rfl: 0    benzonatate (TESSALON PERLES) 100 mg capsule, Take 1 capsule (100 mg total) by mouth 3 (three) times a day as needed for cough, Disp: 30 capsule, Rfl: 0    methylPREDNISolone 4 MG tablet therapy pack, Use as directed on package, Disp: 1 each, Rfl: 0    Current Allergies     Allergies as of 02/23/2019 - Reviewed 02/23/2019   Allergen Reaction Noted    Clomid [clomiphene] Hives 05/18/2017    Latex Blisters 11/01/2017            The following portions of the patient's history were reviewed and updated as appropriate: allergies, current medications, past family history, past medical history, past social history, past surgical history and problem list      Past Medical History:   Diagnosis Date    Cancer [address] [address]     Diabetes mellitus ([facility] [address] )     Hyperlipidemia     Hypertension        Past Surgical History:   Procedure Laterality Date    APPENDECTOMY      BLADDER AUGMENTATION      CHOLECYSTECTOMY      HERNIA REPAIR      HYSTERECTOMY      KNEE ARTHROPLASTY      KNEE ARTHROSCOPY Bilateral     [place] COLONOSCOPY FLX DX W/COLLJ SPEC WHEN PFRMD N/A 1/23/2018    Procedure: COLONOSCOPY;  Surgeon: Bradley Baker DO;  Location: [facility];  Service: Gastroenterology    REDUCTION MAMMAPLASTY      SHOULDER SURGERY      WISDOM TOOTH EXTRACTION         Family History   Problem Relation Age of Onset    Cancer Mother     Heart disease Father     Diabetes Father          Medications have been verified  Objective   /72 (BP Location: Right arm, Patient Position: Sitting, Cuff Size: Large)   Pulse 88   Temp 98 6 °F (37 °C) (Tympanic)   Resp 20   Ht 5' 3" (1 6 m)   Wt 76 2 kg (168 lb)   SpO2 97%   BMI 29 76 kg/m²        Physical Exam     Physical Exam   Constitutional: She is oriented to person, place, and time  She appears well-developed and well-nourished  No distress  HENT:   Head: Normocephalic and atraumatic  Eyes: Pupils are equal, round, and reactive to light  Conjunctivae and EOM are normal    Cardiovascular: Normal rate and regular rhythm  Exam reveals no gallop and no friction rub  No murmur heard  Pulmonary/Chest: Effort normal and breath sounds normal  No respiratory distress  She has no wheezes  She has no rales     Neurological: She is alert and oriented to person, place, and time  Skin: Skin is warm and dry

## 2019-03-12 DIAGNOSIS — D45 POLYCYTHEMIA VERA (HCC): ICD-10-CM

## 2019-03-12 RX ORDER — HYDROXYUREA 500 MG/1
500 CAPSULE ORAL DAILY
Qty: 30 CAPSULE | Refills: 5 | Status: SHIPPED | OUTPATIENT
Start: 2019-03-12 | End: 2019-03-26

## 2019-03-12 NOTE — TELEPHONE ENCOUNTER
Patient called needs refill of Hydrea 500mg, has one pill left which she will take [facility]   Pharmacy [facility] [place]

## 2019-03-16 ENCOUNTER — HOSPITAL ENCOUNTER (INPATIENT)
Facility: HOSPITAL | Age: 54
LOS: 1 days | Discharge: HOME | DRG: 463 | End: 2019-03-17
Attending: EMERGENCY MEDICINE | Admitting: INTERNAL MEDICINE
Payer: COMMERCIAL

## 2019-03-16 ENCOUNTER — APPOINTMENT (EMERGENCY)
Dept: CT IMAGING | Facility: HOSPITAL | Age: 54
DRG: 463 | End: 2019-03-16
Payer: COMMERCIAL

## 2019-03-16 ENCOUNTER — APPOINTMENT (EMERGENCY)
Dept: RADIOLOGY | Facility: HOSPITAL | Age: 54
DRG: 463 | End: 2019-03-16
Payer: COMMERCIAL

## 2019-03-16 DIAGNOSIS — D72.829 LEUKOCYTOSIS: ICD-10-CM

## 2019-03-16 DIAGNOSIS — E87.2 LACTIC ACIDOSIS: ICD-10-CM

## 2019-03-16 DIAGNOSIS — N39.0 UTI (URINARY TRACT INFECTION): Primary | ICD-10-CM

## 2019-03-16 DIAGNOSIS — N30.00 ACUTE CYSTITIS WITHOUT HEMATURIA: ICD-10-CM

## 2019-03-16 DIAGNOSIS — R73.9 HYPERGLYCEMIA: ICD-10-CM

## 2019-03-16 DIAGNOSIS — K92.2 GASTROINTESTINAL HEMORRHAGE, UNSPECIFIED GASTROINTESTINAL HEMORRHAGE TYPE: ICD-10-CM

## 2019-03-16 DIAGNOSIS — A41.9 SEPSIS (HCC): ICD-10-CM

## 2019-03-16 PROBLEM — R71.8 ELEVATED MCV: Status: ACTIVE | Noted: 2019-03-16

## 2019-03-16 PROBLEM — E87.20 LACTIC ACIDOSIS: Status: ACTIVE | Noted: 2019-03-16

## 2019-03-16 PROBLEM — Z72.0 TOBACCO ABUSE: Status: ACTIVE | Noted: 2019-03-16

## 2019-03-16 PROBLEM — E11.65 TYPE 2 DIABETES MELLITUS WITH HYPERGLYCEMIA, WITHOUT LONG-TERM CURRENT USE OF INSULIN (HCC): Status: ACTIVE | Noted: 2019-03-16

## 2019-03-16 PROBLEM — E78.00 HYPERCHOLESTEROLEMIA: Status: ACTIVE | Noted: 2019-03-16

## 2019-03-16 PROBLEM — N81.10 FEMALE BLADDER PROLAPSE: Status: ACTIVE | Noted: 2019-03-16

## 2019-03-16 LAB
ALBUMIN SERPL BCP-MCNC: 2.9 G/DL (ref 3.5–5)
ALP SERPL-CCNC: 104 U/L (ref 46–116)
ALT SERPL W P-5'-P-CCNC: 28 U/L (ref 12–78)
ANION GAP SERPL CALCULATED.3IONS-SCNC: 10 MMOL/L (ref 4–13)
AST SERPL W P-5'-P-CCNC: 17 U/L (ref 5–45)
BACTERIA UR QL AUTO: ABNORMAL /HPF
BASOPHILS # BLD MANUAL: 0 THOUSAND/UL (ref 0–0.1)
BASOPHILS NFR MAR MANUAL: 0 % (ref 0–1)
BILIRUB SERPL-MCNC: 0.4 MG/DL (ref 0.2–1)
BILIRUB UR QL STRIP: NEGATIVE
BUN SERPL-MCNC: 13 MG/DL (ref 5–25)
CALCIUM SERPL-MCNC: 9.1 MG/DL (ref 8.3–10.1)
CHLORIDE SERPL-SCNC: 101 MMOL/L (ref 100–108)
CLARITY UR: ABNORMAL
CO2 SERPL-SCNC: 27 MMOL/L (ref 21–32)
COLOR UR: YELLOW
CREAT SERPL-MCNC: 0.8 MG/DL (ref 0.6–1.3)
EOSINOPHIL # BLD MANUAL: 0.14 THOUSAND/UL (ref 0–0.4)
EOSINOPHIL NFR BLD MANUAL: 1 % (ref 0–6)
ERYTHROCYTE [DISTWIDTH] IN BLOOD BY AUTOMATED COUNT: 12.5 % (ref 11.6–15.1)
GFR SERPL CREATININE-BSD FRML MDRD: 84 ML/MIN/1.73SQ M
GLUCOSE SERPL-MCNC: 183 MG/DL (ref 65–140)
GLUCOSE SERPL-MCNC: 251 MG/DL (ref 65–140)
GLUCOSE SERPL-MCNC: 268 MG/DL (ref 65–140)
GLUCOSE UR STRIP-MCNC: ABNORMAL MG/DL
HCT VFR BLD AUTO: 46.8 % (ref 34.8–46.1)
HGB BLD-MCNC: 15.4 G/DL (ref 11.5–15.4)
HGB UR QL STRIP.AUTO: NEGATIVE
KETONES UR STRIP-MCNC: NEGATIVE MG/DL
LACTATE SERPL-SCNC: 1.3 MMOL/L (ref 0.5–2)
LACTATE SERPL-SCNC: 2.6 MMOL/L (ref 0.5–2)
LEUKOCYTE ESTERASE UR QL STRIP: ABNORMAL
LIPASE SERPL-CCNC: 170 U/L (ref 73–393)
LYMPHOCYTES # BLD AUTO: 2.73 THOUSAND/UL (ref 0.6–4.47)
LYMPHOCYTES # BLD AUTO: 20 % (ref 14–44)
MCH RBC QN AUTO: 32.7 PG (ref 26.8–34.3)
MCHC RBC AUTO-ENTMCNC: 32.9 G/DL (ref 31.4–37.4)
MCV RBC AUTO: 99 FL (ref 82–98)
MONOCYTES # BLD AUTO: 0.27 THOUSAND/UL (ref 0–1.22)
MONOCYTES NFR BLD: 2 % (ref 4–12)
NEUTROPHILS # BLD MANUAL: 9.95 THOUSAND/UL (ref 1.85–7.62)
NEUTS SEG NFR BLD AUTO: 73 % (ref 43–75)
NITRITE UR QL STRIP: NEGATIVE
NON-SQ EPI CELLS URNS QL MICRO: ABNORMAL /HPF
NRBC BLD AUTO-RTO: 0 /100 WBCS
PH UR STRIP.AUTO: 5.5 [PH]
PLATELET # BLD AUTO: 329 THOUSANDS/UL (ref 149–390)
PLATELET BLD QL SMEAR: ADEQUATE
PMV BLD AUTO: 9.7 FL (ref 8.9–12.7)
POTASSIUM SERPL-SCNC: 3.7 MMOL/L (ref 3.5–5.3)
PROT SERPL-MCNC: 7.5 G/DL (ref 6.4–8.2)
PROT UR STRIP-MCNC: NEGATIVE MG/DL
RBC # BLD AUTO: 4.71 MILLION/UL (ref 3.81–5.12)
RBC #/AREA URNS AUTO: ABNORMAL /HPF
RBC MORPH BLD: NORMAL
SODIUM SERPL-SCNC: 138 MMOL/L (ref 136–145)
SP GR UR STRIP.AUTO: 1.02 (ref 1–1.03)
TOTAL CELLS COUNTED SPEC: 100
UROBILINOGEN UR QL STRIP.AUTO: 0.2 E.U./DL
VARIANT LYMPHS # BLD AUTO: 4 %
WBC # BLD AUTO: 13.63 THOUSAND/UL (ref 4.31–10.16)
WBC #/AREA URNS AUTO: ABNORMAL /HPF

## 2019-03-16 PROCEDURE — 84145 PROCALCITONIN (PCT): CPT | Performed by: EMERGENCY MEDICINE

## 2019-03-16 PROCEDURE — 99223 1ST HOSP IP/OBS HIGH 75: CPT | Performed by: INTERNAL MEDICINE

## 2019-03-16 PROCEDURE — 71046 X-RAY EXAM CHEST 2 VIEWS: CPT

## 2019-03-16 PROCEDURE — 96365 THER/PROPH/DIAG IV INF INIT: CPT

## 2019-03-16 PROCEDURE — 81001 URINALYSIS AUTO W/SCOPE: CPT | Performed by: EMERGENCY MEDICINE

## 2019-03-16 PROCEDURE — 83690 ASSAY OF LIPASE: CPT | Performed by: EMERGENCY MEDICINE

## 2019-03-16 PROCEDURE — 82948 REAGENT STRIP/BLOOD GLUCOSE: CPT

## 2019-03-16 PROCEDURE — 87040 BLOOD CULTURE FOR BACTERIA: CPT | Performed by: EMERGENCY MEDICINE

## 2019-03-16 PROCEDURE — 87086 URINE CULTURE/COLONY COUNT: CPT | Performed by: EMERGENCY MEDICINE

## 2019-03-16 PROCEDURE — 99285 EMERGENCY DEPT VISIT HI MDM: CPT

## 2019-03-16 PROCEDURE — 80053 COMPREHEN METABOLIC PANEL: CPT | Performed by: EMERGENCY MEDICINE

## 2019-03-16 PROCEDURE — 36415 COLL VENOUS BLD VENIPUNCTURE: CPT | Performed by: EMERGENCY MEDICINE

## 2019-03-16 PROCEDURE — 87077 CULTURE AEROBIC IDENTIFY: CPT | Performed by: EMERGENCY MEDICINE

## 2019-03-16 PROCEDURE — 74177 CT ABD & PELVIS W/CONTRAST: CPT

## 2019-03-16 PROCEDURE — 85027 COMPLETE CBC AUTOMATED: CPT | Performed by: EMERGENCY MEDICINE

## 2019-03-16 PROCEDURE — 96375 TX/PRO/DX INJ NEW DRUG ADDON: CPT

## 2019-03-16 PROCEDURE — 85007 BL SMEAR W/DIFF WBC COUNT: CPT | Performed by: EMERGENCY MEDICINE

## 2019-03-16 PROCEDURE — 83605 ASSAY OF LACTIC ACID: CPT | Performed by: EMERGENCY MEDICINE

## 2019-03-16 PROCEDURE — 87186 SC STD MICRODIL/AGAR DIL: CPT | Performed by: EMERGENCY MEDICINE

## 2019-03-16 RX ORDER — BENZONATATE 100 MG/1
100 CAPSULE ORAL 3 TIMES DAILY PRN
Status: DISCONTINUED | OUTPATIENT
Start: 2019-03-16 | End: 2019-03-17 | Stop reason: HOSPADM

## 2019-03-16 RX ORDER — SODIUM CHLORIDE 9 MG/ML
125 INJECTION, SOLUTION INTRAVENOUS CONTINUOUS
Status: DISCONTINUED | OUTPATIENT
Start: 2019-03-16 | End: 2019-03-17

## 2019-03-16 RX ORDER — DEXTROSE MONOHYDRATE 25 G/50ML
25 INJECTION, SOLUTION INTRAVENOUS AS NEEDED
Status: DISCONTINUED | OUTPATIENT
Start: 2019-03-16 | End: 2019-03-17 | Stop reason: HOSPADM

## 2019-03-16 RX ORDER — LOSARTAN POTASSIUM 50 MG/1
25 TABLET ORAL DAILY
Status: DISCONTINUED | OUTPATIENT
Start: 2019-03-16 | End: 2019-03-17 | Stop reason: HOSPADM

## 2019-03-16 RX ORDER — HYDROXYUREA 500 MG/1
500 CAPSULE ORAL
Status: DISCONTINUED | OUTPATIENT
Start: 2019-03-16 | End: 2019-03-17 | Stop reason: HOSPADM

## 2019-03-16 RX ORDER — ONDANSETRON 2 MG/ML
4 INJECTION INTRAMUSCULAR; INTRAVENOUS ONCE
Status: COMPLETED | OUTPATIENT
Start: 2019-03-16 | End: 2019-03-16

## 2019-03-16 RX ORDER — CEFTRIAXONE 1 G/50ML
1000 INJECTION, SOLUTION INTRAVENOUS ONCE
Status: COMPLETED | OUTPATIENT
Start: 2019-03-16 | End: 2019-03-16

## 2019-03-16 RX ORDER — OXYCODONE HYDROCHLORIDE 5 MG/1
5 TABLET ORAL EVERY 4 HOURS PRN
Status: DISCONTINUED | OUTPATIENT
Start: 2019-03-16 | End: 2019-03-17 | Stop reason: HOSPADM

## 2019-03-16 RX ORDER — ATORVASTATIN CALCIUM 40 MG/1
40 TABLET, FILM COATED ORAL
Status: DISCONTINUED | OUTPATIENT
Start: 2019-03-17 | End: 2019-03-17 | Stop reason: HOSPADM

## 2019-03-16 RX ORDER — ASPIRIN 81 MG/1
81 TABLET ORAL DAILY
Status: DISCONTINUED | OUTPATIENT
Start: 2019-03-17 | End: 2019-03-17 | Stop reason: HOSPADM

## 2019-03-16 RX ORDER — CEFTRIAXONE 1 G/50ML
1000 INJECTION, SOLUTION INTRAVENOUS EVERY 24 HOURS
Status: DISCONTINUED | OUTPATIENT
Start: 2019-03-17 | End: 2019-03-17 | Stop reason: HOSPADM

## 2019-03-16 RX ORDER — KETOROLAC TROMETHAMINE 30 MG/ML
15 INJECTION, SOLUTION INTRAMUSCULAR; INTRAVENOUS ONCE
Status: COMPLETED | OUTPATIENT
Start: 2019-03-16 | End: 2019-03-16

## 2019-03-16 RX ORDER — OXYCODONE HYDROCHLORIDE 10 MG/1
10 TABLET ORAL EVERY 4 HOURS PRN
Status: DISCONTINUED | OUTPATIENT
Start: 2019-03-16 | End: 2019-03-17 | Stop reason: HOSPADM

## 2019-03-16 RX ORDER — ACETAMINOPHEN 325 MG/1
650 TABLET ORAL EVERY 6 HOURS PRN
Status: DISCONTINUED | OUTPATIENT
Start: 2019-03-16 | End: 2019-03-17 | Stop reason: HOSPADM

## 2019-03-16 RX ADMIN — ONDANSETRON 4 MG: 2 INJECTION INTRAMUSCULAR; INTRAVENOUS at 13:30

## 2019-03-16 RX ADMIN — SODIUM CHLORIDE 2250 ML: 0.9 INJECTION, SOLUTION INTRAVENOUS at 14:40

## 2019-03-16 RX ADMIN — ENOXAPARIN SODIUM 40 MG: 40 INJECTION SUBCUTANEOUS at 19:22

## 2019-03-16 RX ADMIN — OXYCODONE HYDROCHLORIDE 5 MG: 5 TABLET ORAL at 19:22

## 2019-03-16 RX ADMIN — INSULIN LISPRO 2 UNITS: 100 INJECTION, SOLUTION INTRAVENOUS; SUBCUTANEOUS at 21:16

## 2019-03-16 RX ADMIN — SODIUM CHLORIDE 125 ML/HR: 0.9 INJECTION, SOLUTION INTRAVENOUS at 17:12

## 2019-03-16 RX ADMIN — THIAMINE HYDROCHLORIDE 200 MG: 100 INJECTION, SOLUTION INTRAMUSCULAR; INTRAVENOUS at 18:10

## 2019-03-16 RX ADMIN — CEFTRIAXONE 1000 MG: 1 INJECTION, SOLUTION INTRAVENOUS at 14:41

## 2019-03-16 RX ADMIN — INSULIN LISPRO 1 UNITS: 100 INJECTION, SOLUTION INTRAVENOUS; SUBCUTANEOUS at 17:36

## 2019-03-16 RX ADMIN — IOHEXOL 100 ML: 350 INJECTION, SOLUTION INTRAVENOUS at 14:40

## 2019-03-16 RX ADMIN — HYDROXYUREA 500 MG: 500 CAPSULE ORAL at 18:58

## 2019-03-16 RX ADMIN — KETOROLAC TROMETHAMINE 15 MG: 30 INJECTION, SOLUTION INTRAMUSCULAR; INTRAVENOUS at 13:30

## 2019-03-16 RX ADMIN — LOSARTAN POTASSIUM 25 MG: 50 TABLET, FILM COATED ORAL at 18:58

## 2019-03-16 NOTE — ED PROVIDER NOTES
History  Chief Complaint   Patient presents with    Groin Pain     pt c/o increased left groin pain for past couple wks  denies injury/trauma     51-year-old female past medical history of erythrocytosis on hydroxyurea presents to the emergency department for evaluation of a 7 week history of left lower quadrant pain that is being continuous and is described as a dull aching pain ranging from 4-7 over 10 with mild radiation down to the left groin  Patient states that she also notices a bulge in the area that is usually present with Valsalva and standing upright  Patient states that her daughter made her come to the emergency department today  Patient states she has not mentioned this to her primary care physician  Patient states she has had multiple abdominal surgeries to include total hysterectomy and oophorectomy, appendectomy, cholecystectomy  Patient also has associated nausea without vomiting  Patient denies fever, chills, vomiting, melena, hematochezia, dysuria, polyuria, hematuria, vaginal discharge/bleeding, flank pain, chest pain, shortness of breath, dyspnea, history DVT/PE          Prior to Admission Medications   Prescriptions Last Dose Informant Patient Reported?  Taking?   aspirin (ECOTRIN LOW STRENGTH) 81 mg EC tablet  Self Yes Yes   Sig: Take 81 mg by mouth daily   benzonatate (TESSALON PERLES) 100 mg capsule   No Yes   Sig: Take 1 capsule (100 mg total) by mouth 3 (three) times a day as needed for cough   fluticasone (FLONASE) 50 mcg/act nasal spray  Self Yes Yes   Si sprays into each nostril daily   hydroxyurea (HYDREA) 500 mg capsule   No Yes   Sig: Take 1 capsule (500 mg total) by mouth daily   losartan (COZAAR) 25 mg tablet  Self Yes Yes   Sig: Take 1 tablet by mouth daily   metFORMIN (GLUCOPHAGE) 1000 MG tablet  Self Yes Yes   Sig: Take 1 tablet by mouth 2 (two) times a day   simvastatin (ZOCOR) 20 mg tablet  Self No Yes   Sig: Take 1 tablet by mouth daily at bedtime   sitaGLIPtin (JANUVIA) 100 mg tablet  Self No Yes   Sig: Take 1 tablet by mouth daily      Facility-Administered Medications: None       Past Medical History:   Diagnosis Date    Cancer [facility] [address] [facility]     Diabetes mellitus ([facility] [address] [facility]     Hyperlipidemia     Hypertension        Past Surgical History:   Procedure Laterality Date    APPENDECTOMY      BLADDER AUGMENTATION      CHOLECYSTECTOMY      HERNIA REPAIR      HYSTERECTOMY      KNEE ARTHROPLASTY      KNEE ARTHROSCOPY Bilateral     MO COLONOSCOPY FLX DX W/COLLJ SPEC WHEN PFRMD N/A 1/23/2018    Procedure: COLONOSCOPY;  Surgeon: [employer], DO;  Location: [facility];  Service: Gastroenterology    REDUCTION MAMMAPLASTY      SHOULDER SURGERY      WISDOM TOOTH EXTRACTION         Family History   Problem Relation Age of Onset    Cancer Mother     Heart disease Father     Diabetes Father      I have reviewed and agree with the history as documented  Social History     Tobacco Use    Smoking status: Current Every Day Smoker     Packs/day: 0 50     Types: Cigarettes    Smokeless tobacco: Never Used   Substance Use Topics    Alcohol use: No    Drug use: No        Review of Systems   Constitutional: Negative for chills, diaphoresis, fatigue and fever  HENT: Negative for congestion, ear discharge, facial swelling, hearing loss, rhinorrhea, sinus pressure, sinus pain, sneezing, sore throat, tinnitus and trouble swallowing  Eyes: Negative for pain, discharge and redness  Respiratory: Negative for cough, choking, chest tightness, shortness of breath, wheezing and stridor  Cardiovascular: Negative for chest pain, palpitations and leg swelling  Gastrointestinal: Positive for abdominal pain and nausea  Negative for abdominal distention, blood in stool, constipation, diarrhea and vomiting  Endocrine: Negative for cold intolerance, polydipsia and polyuria  Genitourinary: Negative for difficulty urinating, dysuria, enuresis, flank pain, frequency and hematuria  Musculoskeletal: Negative for arthralgias, back pain, gait problem and neck stiffness  Skin: Negative for rash and wound  Neurological: Negative for dizziness, seizures, syncope, weakness, numbness and headaches  Hematological: Negative for adenopathy  Psychiatric/Behavioral: Negative for agitation, confusion, hallucinations, sleep disturbance and suicidal ideas  All other systems reviewed and are negative  Physical Exam  Physical Exam   Constitutional: She is oriented to person, place, and time  She appears well-developed and well-nourished  HENT:   Head: Normocephalic and atraumatic  Right Ear: External ear normal    Left Ear: External ear normal    Nose: No sinus tenderness  No epistaxis  Mouth/Throat: No oropharyngeal exudate  Eyes: Pupils are equal, round, and reactive to light  Conjunctivae and EOM are normal  Right eye exhibits no discharge  Left eye exhibits no discharge  Neck: No JVD present  Cardiovascular: Normal rate, regular rhythm, normal heart sounds and intact distal pulses  Exam reveals no gallop and no friction rub  No murmur heard  Pulmonary/Chest: Effort normal and breath sounds normal  No stridor  No respiratory distress  She has no wheezes  She has no rales  Abdominal: Soft  Bowel sounds are normal  She exhibits no distension and no mass  There is tenderness in the left lower quadrant  There is no rigidity, no rebound, no guarding, no CVA tenderness, no tenderness at McBurney's point and negative Dodson's sign  Musculoskeletal: Normal range of motion  She exhibits no edema, tenderness or deformity  Lymphadenopathy:     She has no cervical adenopathy  Neurological: She is alert and oriented to person, place, and time  She has normal strength  No cranial nerve deficit or sensory deficit  GCS eye subscore is 4  GCS verbal subscore is 5  GCS motor subscore is 6  Reflex Scores:       Patellar reflexes are 2+ on the right side and 2+ on the left side    UE and LE 5/5 strength, No focal neuro deficits  Skin: Skin is warm, dry and intact  Capillary refill takes less than 2 seconds  She is not diaphoretic  Psychiatric: She has a normal mood and affect  Her speech is normal and behavior is normal  Judgment and thought content normal    Nursing note and vitals reviewed  Vital Signs  ED Triage Vitals [03/16/19 1229]   Temperature Pulse Respirations Blood Pressure SpO2   98 4 °F (36 9 °C) 91 18 144/70 97 %      Temp Source Heart Rate Source Patient Position - Orthostatic VS BP Location FiO2 (%)   Temporal Monitor Lying Right arm --      Pain Score       6           Vitals:    03/16/19 1229 03/16/19 1400 03/16/19 1445 03/16/19 1500   BP: 144/70 135/71 137/63 139/69   Pulse: 91 89 75 73   Patient Position - Orthostatic VS: Lying Lying Lying Lying       qSOFA     Row Name 03/16/19 1500 03/16/19 1445 03/16/19 1400 03/16/19 1229       Altered mental status GCS < 15  --  --  --  --     Respiratory Rate > / =22  0  0  0  0     Systolic BP < / =305  0  0  0  0     Q Sofa Score  0  0  0  0           Visual Acuity      ED Medications  Medications   ondansetron (ZOFRAN) injection 4 mg (4 mg Intravenous Given 3/16/19 1330)   ketorolac (TORADOL) injection 15 mg (15 mg Intravenous Given 3/16/19 1330)   cefTRIAXone (ROCEPHIN) IVPB (premix) 1,000 mg (1,000 mg Intravenous New Bag 3/16/19 1441)   sodium chloride 0 9 % bolus 2,250 mL (2,250 mL Intravenous New Bag 3/16/19 1440)   iohexol (OMNIPAQUE) 350 MG/ML injection (MULTI-DOSE) 100 mL (100 mL Intravenous Given 3/16/19 1440)       Diagnostic Studies  Results Reviewed     Procedure Component Value Units Date/Time    Lactic acid, plasma [[id number]] Collected:  03/16/19 1533    Lab Status:   In process Specimen:  Blood from Arm, Right Updated:  03/16/19 1536    CBC and differential [[id number]]  (Abnormal) Collected:  03/16/19 1329    Lab Status:  Final result Specimen:  Blood from Arm, Left Updated:  03/16/19 1406     WBC 13 63 Thousand/uL      RBC 4 71 Million/uL      Hemoglobin 15 4 g/dL      Hematocrit 46 8 %      MCV 99 fL      MCH 32 7 pg      MCHC 32 9 g/dL      RDW 12 5 %      MPV 9 7 fL      Platelets 281 Thousands/uL      nRBC 0 /100 WBCs     Narrative: This is an appended report  These results have been appended to a previously verified report  Blood culture #1 [[id number]] Collected:  03/16/19 1400    Lab Status: In process Specimen:  Blood from Arm, Right Updated:  03/16/19 1403    Blood culture #2 [[id number]] Collected:  03/16/19 1400    Lab Status: In process Specimen:  Blood from Hand, Right Updated:  03/16/19 1403    Procalcitonin [[id number]] Collected:  03/16/19 1400    Lab Status: In process Specimen:  Blood from Arm, Right Updated:  03/16/19 1403    Lactic acid, plasma [[id number]]  (Abnormal) Collected:  03/16/19 1329    Lab Status:  Final result Specimen:  Blood from Arm, Left Updated:  03/16/19 1402     LACTIC ACID 2 6 mmol/L     Narrative:       Result may be elevated if tourniquet was used during collection  Comprehensive metabolic panel [[id number]]  (Abnormal) Collected:  03/16/19 1329    Lab Status:  Final result Specimen:  Blood from Arm, Left Updated:  03/16/19 1359     Sodium 138 mmol/L      Potassium 3 7 mmol/L      Chloride 101 mmol/L      CO2 27 mmol/L      ANION GAP 10 mmol/L      BUN 13 mg/dL      Creatinine 0 80 mg/dL      Glucose 268 mg/dL      Calcium 9 1 mg/dL      AST 17 U/L      ALT 28 U/L      Alkaline Phosphatase 104 U/L      Total Protein 7 5 g/dL      Albumin 2 9 g/dL      Total Bilirubin 0 40 mg/dL      eGFR 84 ml/min/1 73sq m     Narrative:       National Kidney Disease Education Program recommendations are as follows:  GFR calculation is accurate only with a steady state creatinine  Chronic Kidney disease less than 60 ml/min/1 73 sq  meters  Kidney failure less than 15 ml/min/1 73 sq  meters      Urine Microscopic [[id number]]  (Abnormal) Collected:  03/16/19 1339    Lab Status: Final result Specimen:  Urine, Clean Catch Updated:  03/16/19 1355     RBC, UA 0-1 /hpf      WBC, UA 30-50 /hpf      Epithelial Cells Occasional /hpf      Bacteria, UA Occasional /hpf     Lipase [[id number]]  (Normal) Collected:  03/16/19 1329    Lab Status:  Final result Specimen:  Blood from Arm, Left Updated:  03/16/19 1354     Lipase 170 u/L     Urine culture [[id number]] Collected:  03/16/19 1339    Lab Status: In process Specimen:  Urine, Clean Catch Updated:  03/16/19 1353    UA w Reflex to Microscopic w Reflex to Culture [[id number]]  (Abnormal) Collected:  03/16/19 1339    Lab Status:  Final result Specimen:  Urine, Clean Catch Updated:  03/16/19 1344     Color, UA Yellow     Clarity, UA Slightly Cloudy     Specific Gravity, UA 1 025     pH, UA 5 5     Leukocytes, UA Elevated glucose may cause decreased leukocyte values  See urine microscopic for [facility] result/     Nitrite, UA Negative     Protein, UA Negative mg/dl      Glucose, UA >=1000 (1%) mg/dl      Ketones, UA Negative mg/dl      Urobilinogen, UA 0 2 E U /dl      Bilirubin, UA Negative     Blood, UA Negative                 CT abdomen pelvis with contrast   ED Interpretation by Callie Gomez DO (03/16 1510)      No acute abnormality   Hepatic steatosis            Workstation performed: [id number]         Final Result by Rik Omalley MD (03/16 1508)      No acute abnormality   Hepatic steatosis            Workstation performed: [id number]         XR chest 2 views   ED Interpretation by Callie Gomez DO (03/16 1430)   No focal infiltrates                 Procedures  Procedures       Phone Contacts  ED Phone Contact    ED Course  ED Course as of Mar 16 1547   Sat Mar 16, 2019   1408 UTI  Leukocytosis, 13 63  Elevated La, 2 6  Hyperglycemia, 268      1409 CBC and differential(!)                   Initial Sepsis Screening     Row Name 03/16/19 1410 03/16/19 1348             Is the patient's history suggestive of a new or worsening infection?   Yes (Proceed)  (Abnormal)   -JE  Yes (Proceed)  (Abnormal)   -JE       Suspected source of infection  urinary tract infection  -JE  acute abdominal infection  -JE       Are two or more of the following signs & symptoms of infection both present and new to the patient? Yes (Proceed)  (Abnormal)   -JE  Yes (Proceed)  (Abnormal)   -JE       Indicate SIRS criteria  Tachycardia > 90 bpm;WBC > 10% bands  -JE  Tachycardia > 90 bpm;WBC > 10% bands  -JE       If the answer is yes to both questions, suspicion of sepsis is present  --  --       If severe sepsis is present AND tissue hypoperfusion perists in the hour after fluid resuscitation or lactate > 4, the patient meets criteria for SEPTIC SHOCK  --  --       Are any of the following organ dysfunction criteria present within 6 hours of suspected infection and SIRS criteria that are NOT considered to be chronic conditions? Yes  (Abnormal)   -JE  No  -JE       Organ dysfunction  Lactate > 2 0 mmol/L  -JE  --       Date of presentation of severe sepsis  --  --       Time of presentation of severe sepsis  --  --       Tissue hypoperfusion persists in the hour after crystalloid fluid administration, evidenced, by either:  --  --       Was hypotension present within one hour of the conclusion of crystalloid fluid administration?  --  --       Date of presentation of septic shock  --  --       Time of presentation of septic shock  --  --         User Key  (r) = Recorded By, (t) = Taken By, (c) = Cosigned By    [address]  Name Provider Type    [address] [place] [address], DO Physician                  MDM  Number of Diagnoses or Management Options  Hyperglycemia: new and requires workup  Lactic acidosis: new and requires workup  Leukocytosis: new and requires workup  Sepsis [facility]): new and requires workup  UTI (urinary tract infection): new and requires workup  Diagnosis management comments: CT abdomen pelvis with contrast evaluate for diverticulitis versus hernia    CBC, CMP, lipase, lactic acid, IV with Toradol and Zofran  Patient meets SIRS criteria with likely source of UTI, Will obtain Blood cultures, CBC, CMP, [id number], UA, CXR, ECG, proCalc    Given 30ml/kg IV bolus of NS and responed well with his blood pressure after the initial bolus  PT started on Abx of likely source of UTI  Ceftriaxone 1g  Initial Sepsis note competed and 2 hour reassessment note completed       1  Sepsis, 2/2 UTI  -Rocephin IV  -30ml/kg bolus  -SLIM admit    2  Leukocytosis, 13 63    3  Elevated La, 2 6    4  Hyperglycemia, 268                            Disposition  Final diagnoses:   UTI (urinary tract infection)   Sepsis ([facility] [address] )   Hyperglycemia   Leukocytosis   Lactic acidosis     Time reflects when diagnosis was documented in both MDM as applicable and the Disposition within this note     Time User Action Codes Description Comment    3/16/2019  3:31 PM Esteban Sarah Mess Add [N39 0] UTI (urinary tract infection)     3/16/2019  3:31 PM Esteban Sarah Mess Add [A41 9] Sepsis ([facility] [address] )     3/16/2019  3:31 PM Esteban Sarah Mess Add [R73 9] Hyperglycemia     3/16/2019  3:32 PM Orie Miser Add [D72 829] Leukocytosis     3/16/2019  3:32 PM Esteban Sarah Mess Add [E87 2] Lactic acidosis       ED Disposition     ED Disposition Condition Date/Time Comment    Admit Stable Sat Mar 16, 2019  3:31 PM Case was discussed with LENORA and the patient's admission status was agreed to be Admission Status: inpatient status to the service of Dr Saul Calle   Follow-up Information    None         Patient's Medications   Discharge Prescriptions    No medications on file     No discharge procedures on file      ED Provider  Electronically Signed by           Antonio Found, DO  03/16/19 8462

## 2019-03-16 NOTE — ASSESSMENT & PLAN NOTE
· Chronic leukocytosis  · Possible chronic lymphocytic leukemia  · Outpatient follow-up with Hematology/Oncology  · Follow the white blood cell count  · Follow culture results

## 2019-03-16 NOTE — SEPSIS NOTE
Sepsis Note   Lewis Mathias 48 y o  female MRN: [id number]  Unit/Bed#: RM24 Encounter: [id number]      qSOFA     [address] Name 03/16/19 1229                Altered mental status GCS < 15  --        Respiratory Rate > / =50  0        Systolic BP < / =529  0        Q Sofa Score  0            Initial Sepsis Screening     Row Name 03/16/19 1410 03/16/19 1348             Is the patient's history suggestive of a new or worsening infection? Yes (Proceed)  (Abnormal)   -JE  Yes (Proceed)  (Abnormal)   -JE       Suspected source of infection  urinary tract infection  -JE  acute abdominal infection  -JE       Are two or more of the following signs & symptoms of infection both present and new to the patient? Yes (Proceed)  (Abnormal)   -JE  Yes (Proceed)  (Abnormal)   -JE       Indicate SIRS criteria  Tachycardia > 90 bpm;WBC > 10% bands  -JE  Tachycardia > 90 bpm;WBC > 10% bands  -JE       If the answer is yes to both questions, suspicion of sepsis is present  --  --       If severe sepsis is present AND tissue hypoperfusion perists in the hour after fluid resuscitation or lactate > 4, the patient meets criteria for SEPTIC SHOCK  --  --       Are any of the following organ dysfunction criteria present within 6 hours of suspected infection and SIRS criteria that are NOT considered to be chronic conditions?   Yes  (Abnormal)   -JE  No  -JE       Organ dysfunction  Lactate > 2 0 mmol/L  -  --       Date of presentation of severe sepsis  --  --       Time of presentation of severe sepsis  --  --       Tissue hypoperfusion persists in the hour after crystalloid fluid administration, evidenced, by either:  --  --       Was hypotension present within one hour of the conclusion of crystalloid fluid administration?  --  --       Date of presentation of septic shock  --  --       Time of presentation of septic shock  --  --         User Key  (r) = Recorded By, (t) = Taken By, (c) = Cosigned By    [address] Name Provider Type    JESSE Adina Scruggs, DO Physician

## 2019-03-16 NOTE — SEPSIS NOTE
Sepsis Note   Sy Tenorio 48 y o  female MRN: [id number]  Unit/Bed#: RM24 Encounter: [id number]      qSOFA     [address] Name 03/16/19 1229                Altered mental status GCS < 15  --        Respiratory Rate > / =12  0        Systolic BP < / =812  0        Q Sofa Score  0            Initial Sepsis Screening     Row Name 03/16/19 1348                Is the patient's history suggestive of a new or worsening infection? Yes (Proceed)  (Abnormal)   -JE        Suspected source of infection  acute abdominal infection  -JE        Are two or more of the following signs & symptoms of infection both present and new to the patient? Yes (Proceed)  (Abnormal)   -JE        Indicate SIRS criteria  Tachycardia > 90 bpm;WBC > 10% bands  -JE        If the answer is yes to both questions, suspicion of sepsis is present  --        If severe sepsis is present AND tissue hypoperfusion perists in the hour after fluid resuscitation or lactate > 4, the patient meets criteria for SEPTIC SHOCK  --        Are any of the following organ dysfunction criteria present within 6 hours of suspected infection and SIRS criteria that are NOT considered to be chronic conditions?   No  -JE        Organ dysfunction  --        Date of presentation of severe sepsis  --        Time of presentation of severe sepsis  --        Tissue hypoperfusion persists in the hour after crystalloid fluid administration, evidenced, by either:  --        Was hypotension present within one hour of the conclusion of crystalloid fluid administration?  --        Date of presentation of septic shock  --        Time of presentation of septic shock  --          User Key  (r) = Recorded By, (t) = Taken By, (c) = Cosigned By    Initials Name Provider Type    [address] [place] [address] Physician

## 2019-03-16 NOTE — ASSESSMENT & PLAN NOTE
Lab Results   Component Value Date    HGBA1C 8 3 (H) 08/15/2017       Recent Labs     03/16/19  1640   POCGLU 183*       Blood Sugar Average: Last 72 hrs:  (P) 183     · The patient has not had her metformin in several weeks  · I recommend discontinuing the form indefinitely in the setting of lactic acidosis  · Continue januvia 100 mg PO Qdaily  · Hypoglycemia protocol  · Follow the blood glucose trend

## 2019-03-16 NOTE — ED NOTES
Patient states she is "loopy from the tramadol"  I informed her that I gave her ketorolac and that it does not make you loopy        Hilario Duane, RN  03/16/19 3690

## 2019-03-16 NOTE — H&P
H&P- Elaina Bond 1965, 48 y o  female MRN: [id number]    Unit/Bed#: 423-01 Encounter: [id number]    Primary Care Provider: Oscar Ricks DO   Date and time admitted to hospital: 3/16/2019 12:24 PM        * Acute cystitis without hematuria  Assessment & Plan  · Admit to med/surg level of care  · History of recurrent urinary tract infections  · This will be considered a complicated urinary tract infection in the setting of bladder prolapse  · IV ceftriaxone will be utilized  · NSS IV fluids  · Check an ultrasound of the kidneys and bladder    Female bladder prolapse  Assessment & Plan  · Check an ultrasound of the kidneys and bladder  · Outpatient follow-up with Urology    Lactic acidosis  Assessment & Plan  · Resolved with IV fluids  · Thiamine 200 mg IV x 1 dose  · Discontinue metformin indefinitely    Leukocytosis  Assessment & Plan  · Chronic leukocytosis  · Possible chronic lymphocytic leukemia  · Outpatient follow-up with Hematology/Oncology  · Follow the white blood cell count  · Follow culture results    Tobacco abuse  Assessment & Plan  · The patient has an allergy to the nicotine patch and nicotine gum  · Smoking cessation counseling    Hypercholesterolemia  Assessment & Plan  · Change statin therapy to high-intensity statin therapy with atorvastatin 40 mg daily    Elevated MCV  Assessment & Plan  · Check a vitamin B12 level and folate level    Type 2 diabetes mellitus with hyperglycemia, without long-term current use of insulin (HCC)  Assessment & Plan  Lab Results   Component Value Date    HGBA1C 8 3 (H) 08/15/2017       Recent Labs     03/16/19  1640   POCGLU 183*       Blood Sugar Average: Last 72 hrs:  (P) 183     · The patient has not had her metformin in several weeks  · I recommend discontinuing the form indefinitely in the setting of lactic acidosis  · Continue januvia 100 mg PO Qdaily  · Continue losartan for renal protection in the setting of type 2 diabetes mellitus  · Hypoglycemia protocol  · Follow the blood glucose trend    Polycythemia vera (HCC)  Assessment & Plan  · Continue PO hydroxyurea  · Outpatient follow-up with Hematology/Oncology      VTE Prophylaxis: Enoxaparin (Lovenox)  / sequential compression device   Code Status: Level 1-Full Code    Discussion with family:  I updated the patient's daughter at the bedside  Anticipated Length of Stay:  Patient will be admitted on an Inpatient basis with an anticipated length of stay of greater than 2 midnights  Justification for Hospital Stay:  The patient requires IV antibiotics, continuous IV fluids, and an ultrasound of the kidneys and bladder completed  Total Time for Visit, including Counseling / Coordination of Care: 1 hour  Greater than 50% of this total time spent on direct patient counseling and coordination of care  Chief Complaint:  Dysuria and urinary frequency    History of Present Illness: Jaye Carranza is a 48 y o  female who presents to the emergency department with the complaints of dysuria and urinary frequency  The patient has a history of recurrent urinary tract infections  In addition, the patient had previous bladder surgery bladder prolapse and is now experiencing bladder prolapse again  Over the last week, she has been experiencing worsening dysuria and urinary frequency  She is complaining of vaginal pain as well  In addition, she is experiencing suprapubic abdominal pain  Nothing seemed to relieve her symptoms  She was recently treated for a tracheobronchitis, and she continues to complain of an intermittent cough as well as sinus pressure  No fevers or chills  No nausea or vomiting  Review of Systems:    Review of Systems:  Per HPI, all other systems have been reviewed and were negative      Past Medical and Surgical History:     Past Medical History:   Diagnosis Date    Arthritis     [facility] )     leukemia    Diabetes mellitus ([facility] )     Hyperlipidemia     Hypertension        Past Surgical History:   Procedure Laterality Date    APPENDECTOMY      BLADDER AUGMENTATION      CHOLECYSTECTOMY      HERNIA REPAIR      HYSTERECTOMY      KNEE ARTHROPLASTY      KNEE ARTHROSCOPY Bilateral     IN COLONOSCOPY FLX DX W/COLLJ SPEC WHEN PFRMD N/A 1/23/2018    Procedure: COLONOSCOPY;  Surgeon: Nadia Mcduffie DO;  Location: [facility];  Service: Gastroenterology    REDUCTION MAMMAPLASTY      SHOULDER SURGERY      WISDOM TOOTH EXTRACTION         Meds/Allergies:    Prior to Admission medications    Medication Sig Start Date End Date Taking? Authorizing Provider   aspirin (ECOTRIN LOW STRENGTH) 81 mg EC tablet Take 81 mg by mouth daily   Yes Historical Provider, MD   benzonatate (TESSALON PERLES) 100 mg capsule Take 1 capsule (100 mg total) by mouth 3 (three) times a day as needed for cough 2/23/19  Yes Munira Duff PA-C   fluticasone (FLONASE) 50 mcg/act nasal spray 2 sprays into each nostril as needed    Yes Historical Provider, MD   hydroxyurea (HYDREA) 500 mg capsule Take 1 capsule (500 mg total) by mouth daily  Patient taking differently: Take 500 mg by mouth daily Takes at 530PM 3/12/19  Yes Seda Collieron, DO   losartan (COZAAR) 25 mg tablet Take 1 tablet by mouth daily 7/31/17  Yes Historical Provider, MD   metFORMIN (GLUCOPHAGE) 1000 MG tablet Take 1 tablet by mouth 2 (two) times a day 5/11/17  Yes Historical Provider, MD   simvastatin (ZOCOR) 20 mg tablet Take 1 tablet by mouth daily at bedtime 1/25/18  Yes Agustín Patricio,    sitaGLIPtin (JANUVIA) 100 mg tablet Take 1 tablet by mouth daily 1/25/18  Yes Agustín Patricio DO   methylPREDNISolone 4 MG tablet therapy pack Use as directed on package 2/23/19 3/16/19  Yolanda Bolaños PA-C     I have reviewed home medications with patient personally  Allergies:    Allergies   Allergen Reactions    Chantix [Varenicline] Other (See Comments)     Psychiatric side effects    Wellbutrin [Bupropion] Other (See Comments)     Psychiatric side effects    Clomid [Clomiphene] Hives    Latex Blisters       Social History:     Marital Status: /Civil Union     Substance Use History:   Social History     Substance and Sexual Activity   Alcohol Use No    Alcohol/week: 0 0 oz    Frequency: Patient refused    Drinks per session: Patient refused    Binge frequency: Never    Comment: N/A     Social History     Tobacco Use   Smoking Status Current Every Day Smoker    Packs/day: 1 00    Types: Cigarettes   Smokeless Tobacco Never Used     Social History     Substance and Sexual Activity   Drug Use No       Family History:    non-contributory    Physical Exam:     Vitals:   Blood Pressure: 165/75 (03/16/19 1604)  Pulse: 81 (03/16/19 1604)  Temperature: 98 °F (36 7 °C) (03/16/19 1604)  Temp Source: Temporal (03/16/19 1604)  Respirations: 16 (03/16/19 1604)  Height: 5' 3" (160 cm) (03/16/19 1604)  Weight - Scale: 75 6 kg (166 lb 10 7 oz) (03/16/19 1604)  SpO2: 98 % (03/16/19 1604)    Physical Exam  General:  NAD, awake, alert, follows commands  HEENT:  NC/AT, mucous membranes dry  Neck:  Supple, No JVP elevation  CV:  + S1, + S2, RRR  Pulm:  Lung fields are CTA bilaterally  Abd:  Soft, + Suprapubic abdominal pain with palpation, Non-distended  Ext:  No clubbing/cyanosis/edema  Skin:  No rashes      Additional Data:     Lab Results: I have personally reviewed pertinent reports        Results from last 7 days   Lab Units 03/16/19  1329   WBC Thousand/uL 13 63*   HEMOGLOBIN g/dL 15 4   HEMATOCRIT % 46 8*   PLATELETS Thousands/uL 329   LYMPHO PCT % 20   MONO PCT % 2*   EOS PCT % 1     Results from last 7 days   Lab Units 03/16/19  1329   SODIUM mmol/L 138   POTASSIUM mmol/L 3 7   CHLORIDE mmol/L 101   CO2 mmol/L 27   BUN mg/dL 13   CREATININE mg/dL 0 80   ANION GAP mmol/L 10   CALCIUM mg/dL 9 1   ALBUMIN g/dL 2 9*   TOTAL BILIRUBIN mg/dL 0 40   ALK PHOS U/L 104   ALT U/L 28   AST U/L 17   GLUCOSE RANDOM mg/dL 268*         Results from last 7 days   Lab Units 03/16/19  1640   POC GLUCOSE mg/dl 183*         Results from last 7 days   Lab Units 03/16/19  1533 03/16/19  1329   LACTIC ACID mmol/L 1 3 2 6*       Imaging: I have personally reviewed pertinent reports  CT abdomen pelvis with contrast   ED Interpretation by Inder Ramirez DO (03/16 1510)      No acute abnormality   Hepatic steatosis            Workstation performed: [id number]         Final Result by Cortney Parks MD (03/16 1508)      No acute abnormality   Hepatic steatosis            Workstation performed: [id number]         XR chest 2 views   ED Interpretation by Inder Ramirez DO (03/16 1430)   No focal infiltrates      US retroperitoneal complete    (Results Pending)       Allscripts / Epic Records Reviewed: Yes     ** Please Note: This note has been constructed using a voice recognition system   **

## 2019-03-16 NOTE — ASSESSMENT & PLAN NOTE
· Admit to med/surg level of care  · History of recurrent urinary tract infections  · This will be considered a complicated urinary tract infection in the setting of bladder prolapse  · IV ceftriaxone will be utilized  · NSS IV fluids  · Check an ultrasound of the kidneys and bladder

## 2019-03-16 NOTE — ASSESSMENT & PLAN NOTE
Lab Results   Component Value Date    HGBA1C 8 3 (H) 08/15/2017       Recent Labs     03/16/19  1640   POCGLU 183*       Blood Sugar Average: Last 72 hrs:  (P) 183     · The patient has not had her metformin in several weeks  · I recommend discontinuing the metformin indefinitely in the setting of lactic acidosis  · Continue januvia 100 mg PO Qdaily  · Continue losartan for renal protection in the setting of type 2 diabetes mellitus  · Hypoglycemia protocol  · Follow the blood glucose trend

## 2019-03-17 ENCOUNTER — APPOINTMENT (INPATIENT)
Dept: ULTRASOUND IMAGING | Facility: HOSPITAL | Age: 54
DRG: 463 | End: 2019-03-17
Payer: COMMERCIAL

## 2019-03-17 VITALS
HEIGHT: 63 IN | SYSTOLIC BLOOD PRESSURE: 150 MMHG | OXYGEN SATURATION: 96 % | TEMPERATURE: 96.3 F | RESPIRATION RATE: 17 BRPM | HEART RATE: 91 BPM | DIASTOLIC BLOOD PRESSURE: 71 MMHG | BODY MASS INDEX: 29.53 KG/M2 | WEIGHT: 166.67 LBS

## 2019-03-17 PROBLEM — K92.2 GI BLEED: Status: ACTIVE | Noted: 2019-03-17

## 2019-03-17 LAB
ALBUMIN SERPL BCP-MCNC: 2.5 G/DL (ref 3.5–5)
ALP SERPL-CCNC: 95 U/L (ref 46–116)
ALT SERPL W P-5'-P-CCNC: 26 U/L (ref 12–78)
ANION GAP SERPL CALCULATED.3IONS-SCNC: 8 MMOL/L (ref 4–13)
AST SERPL W P-5'-P-CCNC: 18 U/L (ref 5–45)
BASOPHILS # BLD AUTO: 0.08 THOUSANDS/ΜL (ref 0–0.1)
BASOPHILS NFR BLD AUTO: 1 % (ref 0–1)
BILIRUB SERPL-MCNC: 0.2 MG/DL (ref 0.2–1)
BUN SERPL-MCNC: 16 MG/DL (ref 5–25)
CALCIUM SERPL-MCNC: 8.4 MG/DL (ref 8.3–10.1)
CHLORIDE SERPL-SCNC: 106 MMOL/L (ref 100–108)
CK SERPL-CCNC: 65 U/L (ref 26–192)
CO2 SERPL-SCNC: 23 MMOL/L (ref 21–32)
CREAT SERPL-MCNC: 0.81 MG/DL (ref 0.6–1.3)
EOSINOPHIL # BLD AUTO: 0.32 THOUSAND/ΜL (ref 0–0.61)
EOSINOPHIL NFR BLD AUTO: 2 % (ref 0–6)
ERYTHROCYTE [DISTWIDTH] IN BLOOD BY AUTOMATED COUNT: 12.7 % (ref 11.6–15.1)
EST. AVERAGE GLUCOSE BLD GHB EST-MCNC: 278 MG/DL
FOLATE SERPL-MCNC: 7.4 NG/ML (ref 3.1–17.5)
GFR SERPL CREATININE-BSD FRML MDRD: 83 ML/MIN/1.73SQ M
GLUCOSE SERPL-MCNC: 218 MG/DL (ref 65–140)
GLUCOSE SERPL-MCNC: 267 MG/DL (ref 65–140)
GLUCOSE SERPL-MCNC: 274 MG/DL (ref 65–140)
HBA1C MFR BLD: 11.3 % (ref 4.2–6.3)
HCT VFR BLD AUTO: 42.6 % (ref 34.8–46.1)
HGB BLD-MCNC: 13.9 G/DL (ref 11.5–15.4)
IMM GRANULOCYTES # BLD AUTO: 0.07 THOUSAND/UL (ref 0–0.2)
IMM GRANULOCYTES NFR BLD AUTO: 1 % (ref 0–2)
LYMPHOCYTES # BLD AUTO: 3.65 THOUSANDS/ΜL (ref 0.6–4.47)
LYMPHOCYTES NFR BLD AUTO: 25 % (ref 14–44)
MAGNESIUM SERPL-MCNC: 1.8 MG/DL (ref 1.6–2.6)
MCH RBC QN AUTO: 33 PG (ref 26.8–34.3)
MCHC RBC AUTO-ENTMCNC: 32.6 G/DL (ref 31.4–37.4)
MCV RBC AUTO: 101 FL (ref 82–98)
MONOCYTES # BLD AUTO: 0.54 THOUSAND/ΜL (ref 0.17–1.22)
MONOCYTES NFR BLD AUTO: 4 % (ref 4–12)
NEUTROPHILS # BLD AUTO: 10.06 THOUSANDS/ΜL (ref 1.85–7.62)
NEUTS SEG NFR BLD AUTO: 67 % (ref 43–75)
NRBC BLD AUTO-RTO: 0 /100 WBCS
PHOSPHATE SERPL-MCNC: 4 MG/DL (ref 2.7–4.5)
PLATELET # BLD AUTO: 281 THOUSANDS/UL (ref 149–390)
PMV BLD AUTO: 9.8 FL (ref 8.9–12.7)
POTASSIUM SERPL-SCNC: 4.2 MMOL/L (ref 3.5–5.3)
PROCALCITONIN SERPL-MCNC: <0.05 NG/ML
PROCALCITONIN SERPL-MCNC: <0.05 NG/ML
PROT SERPL-MCNC: 6.5 G/DL (ref 6.4–8.2)
RBC # BLD AUTO: 4.21 MILLION/UL (ref 3.81–5.12)
SODIUM SERPL-SCNC: 137 MMOL/L (ref 136–145)
TSH SERPL DL<=0.05 MIU/L-ACNC: 1.6 UIU/ML (ref 0.36–3.74)
VIT B12 SERPL-MCNC: 452 PG/ML (ref 100–900)
WBC # BLD AUTO: 14.72 THOUSAND/UL (ref 4.31–10.16)

## 2019-03-17 PROCEDURE — 82746 ASSAY OF FOLIC ACID SERUM: CPT | Performed by: INTERNAL MEDICINE

## 2019-03-17 PROCEDURE — 84100 ASSAY OF PHOSPHORUS: CPT | Performed by: INTERNAL MEDICINE

## 2019-03-17 PROCEDURE — 83036 HEMOGLOBIN GLYCOSYLATED A1C: CPT | Performed by: INTERNAL MEDICINE

## 2019-03-17 PROCEDURE — 85025 COMPLETE CBC W/AUTO DIFF WBC: CPT | Performed by: INTERNAL MEDICINE

## 2019-03-17 PROCEDURE — 83735 ASSAY OF MAGNESIUM: CPT | Performed by: INTERNAL MEDICINE

## 2019-03-17 PROCEDURE — 80074 ACUTE HEPATITIS PANEL: CPT | Performed by: INTERNAL MEDICINE

## 2019-03-17 PROCEDURE — 99238 HOSP IP/OBS DSCHRG MGMT 30/<: CPT | Performed by: PHYSICIAN ASSISTANT

## 2019-03-17 PROCEDURE — 82550 ASSAY OF CK (CPK): CPT | Performed by: INTERNAL MEDICINE

## 2019-03-17 PROCEDURE — 84145 PROCALCITONIN (PCT): CPT | Performed by: INTERNAL MEDICINE

## 2019-03-17 PROCEDURE — 82607 VITAMIN B-12: CPT | Performed by: INTERNAL MEDICINE

## 2019-03-17 PROCEDURE — 76770 US EXAM ABDO BACK WALL COMP: CPT

## 2019-03-17 PROCEDURE — 80053 COMPREHEN METABOLIC PANEL: CPT | Performed by: INTERNAL MEDICINE

## 2019-03-17 PROCEDURE — 82948 REAGENT STRIP/BLOOD GLUCOSE: CPT

## 2019-03-17 PROCEDURE — 84443 ASSAY THYROID STIM HORMONE: CPT | Performed by: INTERNAL MEDICINE

## 2019-03-17 RX ORDER — CEPHALEXIN 500 MG/1
500 CAPSULE ORAL EVERY 12 HOURS SCHEDULED
Qty: 4 CAPSULE | Refills: 0 | Status: SHIPPED | OUTPATIENT
Start: 2019-03-18 | End: 2019-03-20

## 2019-03-17 RX ADMIN — MINERAL OIL, PETROLATUM, PHENYLEPHRINE HCL: 2.5; 140; 749 OINTMENT TOPICAL at 14:13

## 2019-03-17 RX ADMIN — CEFTRIAXONE 1000 MG: 1 INJECTION, SOLUTION INTRAVENOUS at 14:43

## 2019-03-17 RX ADMIN — LOSARTAN POTASSIUM 25 MG: 50 TABLET, FILM COATED ORAL at 08:21

## 2019-03-17 RX ADMIN — INSULIN LISPRO 3 UNITS: 100 INJECTION, SOLUTION INTRAVENOUS; SUBCUTANEOUS at 11:37

## 2019-03-17 RX ADMIN — SODIUM CHLORIDE 125 ML/HR: 0.9 INJECTION, SOLUTION INTRAVENOUS at 01:05

## 2019-03-17 RX ADMIN — INSULIN LISPRO 2 UNITS: 100 INJECTION, SOLUTION INTRAVENOUS; SUBCUTANEOUS at 07:52

## 2019-03-17 RX ADMIN — SITAGLIPTIN 100 MG: 100 TABLET, FILM COATED ORAL at 08:21

## 2019-03-17 RX ADMIN — SODIUM CHLORIDE 125 ML/HR: 0.9 INJECTION, SOLUTION INTRAVENOUS at 09:23

## 2019-03-17 RX ADMIN — ASPIRIN 81 MG: 81 TABLET, COATED ORAL at 08:21

## 2019-03-17 NOTE — ASSESSMENT & PLAN NOTE
· Nursing and patient reports bleeding, patient states she thought she had a bowel movement and it was all blood  · The patient states she follows GI and had colonoscopy in the past and was told she has diverticulosis and hemorrhoids  · She states the bleeding is more than it normally is  · Hemoglobin on admission noted to be 15 4 which decreased to 13 9 today  · The patient was had no episodes of bleeding  · The patient was discharged home with a script for a CBC in 2 days  · Outpatient follow up with GI and PCP

## 2019-03-17 NOTE — PROGRESS NOTES
Progress Note - Ada Wilkerson 1965, 48 y o  female MRN: [id number]    Unit/Bed#: 423-01 Encounter: [id number]    Primary Care Provider: Neil Lennon DO   Date and time admitted to hospital: 3/16/2019 12:24 PM        * Acute cystitis without hematuria  Assessment & Plan  · History of recurrent urinary tract infections  · This will be considered a complicated urinary tract infection in the setting of bladder prolapse  · Continue IV ceftriaxone   · Urine culture pending  · Will discontinue NSS IV fluids given patient is eating and drinking well  · Ultrasound of the kidneys and bladder: No evidence of obstructive uropathy  Minor contour irregularity at the anterior urinary bladder consistent with history of pelvic floor reconstruction  Otherwise unremarkable sonographic appearance of the urinary bladder    GI bleed  Assessment & Plan  · Nursing and patient reports bleeding, patient states she thought she had a bowel movement and it was all blood  · The patient states she follows GI and had colonoscopy in the past and was told she has diverticulosis and hemorrhoids  · She states the bleeding is more than it normally is  · Will order preparation H cream   · Hemoglobin on admission noted to be 15 4 which decreased to 13 9 today  · Continue to monitor H&H   · Consider GI consult if bleeding continues  Female bladder prolapse  Assessment & Plan  · Ultrasound of the kidneys and bladder: No evidence of obstructive uropathy  Minor contour irregularity at the anterior urinary bladder consistent with history of pelvic floor reconstruction    Otherwise unremarkable sonographic appearance of the urinary bladder  · Outpatient follow-up with Urology    Lactic acidosis  Assessment & Plan  · Resolved with IV fluids  · Thiamine 200 mg IV x 1 dose  · Discontinue metformin indefinitely    Leukocytosis  Assessment & Plan  · Chronic leukocytosis  · Possible chronic lymphocytic leukemia  · Outpatient follow-up with Hematology/Oncology  · Follow the white blood cell count  · Follow culture results    Tobacco abuse  Assessment & Plan  · The patient has an allergy to the nicotine patch and nicotine gum  · Smoking cessation counseling    Hypercholesterolemia  Assessment & Plan  · Change statin therapy to high-intensity statin therapy with atorvastatin 40 mg daily    Elevated MCV  Assessment & Plan  · Vitamin B12 level and folate levels are pending  Type 2 diabetes mellitus with hyperglycemia, without long-term current use of insulin (HCC)  Assessment & Plan  Lab Results   Component Value Date    HGBA1C 8 3 (H) 08/15/2017       Recent Labs     03/16/19  1640   POCGLU 183*       Blood Sugar Average: Last 72 hrs:  (P) 183     · The patient has not had her metformin in several weeks  · I recommend discontinuing the metformin indefinitely in the setting of lactic acidosis  · Continue januvia 100 mg PO Qdaily  · Continue losartan for renal protection in the setting of type 2 diabetes mellitus  · Hypoglycemia protocol  · Follow the blood glucose trend    Polycythemia vera (HCC)  Assessment & Plan  · Continue PO hydroxyurea  · Outpatient follow-up with Hematology/Oncology      VTE Pharmacologic Prophylaxis:   Pharmacologic: Enoxaparin (Lovenox)  Mechanical VTE Prophylaxis in Place: Yes    Patient Centered Rounds: I have performed bedside rounds with nursing staff today  Discussions with Specialists or Other Care Team Provider: nursing, CM, and attending      Time Spent for Care: 30 minutes  More than 50% of total time spent on counseling and coordination of care as described above  Current Length of Stay: 1 day(s)    Current Patient Status: Inpatient   Certification Statement: The patient will continue to require additional inpatient hospital stay due to continued need for IV antibiotics and continued monitoring of H&H  Discharge Plan: TBD    Code Status: Level 1 - Full Code      Subjective:    The patient was seen and examined  The patient states that early this am she went to the bathroom and thought she had a bowel movement but it was all blood  She states she has a history of hemorrhoids but feels this was more blood than it usually is  Objective:     Vitals:   Temp (24hrs), Av 6 °F (36 4 °C), Min:97 °F (36 1 °C), Max:98 °F (36 7 °C)    Temp:  [97 °F (36 1 °C)-98 °F (36 7 °C)] 97 9 °F (36 6 °C)  HR:  [73-89] 89  Resp:  [16-20] 18  BP: (135-165)/(63-80) 149/65  SpO2:  [96 %-98 %] 98 %  Body mass index is 29 52 kg/m²  Input and Output Summary (last 24 hours): Intake/Output Summary (Last 24 hours) at 3/17/2019 1347  Last data filed at 3/17/2019 1245  Gross per 24 hour   Intake 2841 67 ml   Output --   Net 2841 67 ml       Physical Exam:     Physical Exam   Constitutional: She is oriented to person, place, and time  Vital signs are normal  She appears well-developed and well-nourished  She is active and cooperative  Cardiovascular: Normal rate and regular rhythm  Pulmonary/Chest: Effort normal and breath sounds normal  She has no wheezes  She has no rhonchi  She has no rales  Abdominal: Soft  Normal appearance and bowel sounds are normal  There is tenderness in the epigastric area and left lower quadrant  There is no rigidity and no guarding  Neurological: She is alert and oriented to person, place, and time  No cranial nerve deficit  Skin: Skin is warm, dry and intact  Nursing note and vitals reviewed        Additional Data:     Labs:    Results from last 7 days   Lab Units 19  0552   WBC Thousand/uL 14 72*   HEMOGLOBIN g/dL 13 9   HEMATOCRIT % 42 6   PLATELETS Thousands/uL 281   NEUTROS PCT % 67   LYMPHS PCT % 25   MONOS PCT % 4   EOS PCT % 2     Results from last 7 days   Lab Units 19  0552   SODIUM mmol/L 137   POTASSIUM mmol/L 4 2   CHLORIDE mmol/L 106   CO2 mmol/L 23   BUN mg/dL 16   CREATININE mg/dL 0 81   ANION GAP mmol/L 8   CALCIUM mg/dL 8 4   ALBUMIN g/dL 2 5*   TOTAL BILIRUBIN mg/dL 0 20   ALK PHOS U/L 95   ALT U/L 26   AST U/L 18   GLUCOSE RANDOM mg/dL 267*         Results from last 7 days   Lab Units 03/17/19  1136 03/17/19  0746 03/16/19  2046 03/16/19  1640   POC GLUCOSE mg/dl 274* 218* 251* 183*         Results from last 7 days   Lab Units 03/16/19  1533 03/16/19  1329   LACTIC ACID mmol/L 1 3 2 6*           * I Have Reviewed All Lab Data Listed Above  * Additional Pertinent Lab Tests Reviewed: All Labs Within Last 24 Hours Reviewed    Imaging:    Imaging Reports Reviewed Today Include: none  Imaging Personally Reviewed by Myself Includes:  none    Recent Cultures (last 7 days):     Results from last 7 days   Lab Units 03/16/19  1339   URINE CULTURE  60,000-69,000 cfu/ml Gram Negative Tu*       Last 24 Hours Medication List:     Current Facility-Administered Medications:  acetaminophen 650 mg Oral Q6H PRN Shagufta Bal, DO   aspirin 81 mg Oral Daily Shagufta Bal, DO   atorvastatin 40 mg Oral Daily With Ricco Neville, DO   benzonatate 100 mg Oral TID PRN Shagufta Bal, DO   cefTRIAXone 1,000 mg Intravenous Q24H Shagufta Bal, DO   dextrose 25 mL Intravenous PRN Shagufta Bal, DO   enoxaparin 40 mg Subcutaneous Q24H Shagufta Bal, DO   hydroxyurea 500 mg Oral After Ricco Neville, DO   insulin lispro 1-5 Units Subcutaneous TID AC Michel Cox, DO   insulin lispro 1-5 Units Subcutaneous HS Shagufta Bal, DO   losartan 25 mg Oral Daily Shagufta Bal, DO   oxyCODONE 10 mg Oral Q4H PRN Shagufta Bal, DO   oxyCODONE 5 mg Oral Q4H PRN Shagufta Bal, DO   phenylephrine-shark liver oil-mineral oil-petrolatum  Rectal BID PRN Mavis Denson PA-C   sitaGLIPtin 100 mg Oral Daily Shagufta Bal, DO        Today, Patient Was Seen By: Mavis Denson PA-C    ** Please Note: Dictation voice to text software may have been used in the creation of this document   **

## 2019-03-17 NOTE — ASSESSMENT & PLAN NOTE
· Ultrasound of the kidneys and bladder: No evidence of obstructive uropathy  Minor contour irregularity at the anterior urinary bladder consistent with history of pelvic floor reconstruction    Otherwise unremarkable sonographic appearance of the urinary bladder  · Outpatient follow-up with Urology

## 2019-03-17 NOTE — NURSING NOTE
Pt independent at home with ADLS  Educated on lactic acidosis and to stop taking metformin d/t it  Verbalized understanding

## 2019-03-17 NOTE — ASSESSMENT & PLAN NOTE
· History of recurrent urinary tract infections  · This will be considered a complicated urinary tract infection in the setting of bladder prolapse  · Continue IV ceftriaxone   · Urine culture pending  · Will discontinue NSS IV fluids given patient is eating and drinking well  · Ultrasound of the kidneys and bladder: No evidence of obstructive uropathy  Minor contour irregularity at the anterior urinary bladder consistent with history of pelvic floor reconstruction    Otherwise unremarkable sonographic appearance of the urinary bladder

## 2019-03-17 NOTE — ASSESSMENT & PLAN NOTE
· Nursing and patient reports bleeding, patient states she thought she had a bowel movement and it was all blood  · The patient states she follows GI and had colonoscopy in the past and was told she has diverticulosis and hemorrhoids  · She states the bleeding is more than it normally is  · Will order preparation H cream   · Hemoglobin on admission noted to be 15 4 which decreased to 13 9 today  · Continue to monitor H&H   · Consider GI consult if bleeding continues

## 2019-03-17 NOTE — ASSESSMENT & PLAN NOTE
Lab Results   Component Value Date    HGBA1C 11 3 (H) 03/17/2019       Recent Labs     03/16/19  1640 03/16/19  2046 03/17/19  0746 03/17/19  1136   POCGLU 183* 251* 218* 274*       Blood Sugar Average: Last 72 hrs:  (P) 231 5     · The patient has not had her metformin in several weeks  · I recommend discontinuing the metformin indefinitely in the setting of lactic acidosis  · Continue januvia 100 mg PO Qdaily  · Continue losartan for renal protection in the setting of type 2 diabetes mellitus  · Outpatient follow up with PCP

## 2019-03-17 NOTE — UTILIZATION REVIEW
Initial Clinical Review    Admission: Date/Time/Statement: 3/16/19 @ 1529   Orders Placed This Encounter   Procedures    Inpatient Admission     Standing Status:   Standing     Number of Occurrences:   1     Order Specific Question:   Admitting Physician     Answer:   Jl No     Order Specific Question:   Level of Care     Answer:   Med Surg [16]     Order Specific Question:   Estimated length of stay     Answer:   More than 2 Midnights     Order Specific Question:   Certification     Answer:   I certify that inpatient services are medically necessary for this patient for a duration of greater than two midnights  See H&P and MD Progress Notes for additional information about the patient's course of treatment  ED: Date/Time/Mode of Arrival:   ED Arrival Information     Expected Arrival Acuity Means of Arrival Escorted By Service Admission Type    - 3/16/2019 12:23 Urgent Walk-In Family Member General Medicine Urgent    Arrival Complaint    groin pain        Chief Complaint:   Chief Complaint   Patient presents with    Groin Pain     pt c/o increased left groin pain for past couple wks   denies injury/trauma     Assessment/Plan: Acute cystitis without hematuria  Assessment & Plan  · Admit to med/surg level of care  · History of recurrent urinary tract infections  · This will be considered a complicated urinary tract infection in the setting of bladder prolapse  · IV ceftriaxone will be utilized  · NSS IV fluids  · Check an ultrasound of the kidneys and bladder     Female bladder prolapse  Assessment & Plan  · Check an ultrasound of the kidneys and bladder  · Outpatient follow-up with Urology     Lactic acidosis  Assessment & Plan  · Resolved with IV fluids  · Thiamine 200 mg IV x 1 dose  · Discontinue metformin indefinitely  Leukocytosis  Assessment & Plan  · Chronic leukocytosis  · Possible chronic lymphocytic leukemia  · Outpatient follow-up with Hematology/Oncology  · Follow the white blood cell count  · Follow culture results     Tobacco abuse  Assessment & Plan  · The patient has an allergy to the nicotine patch and nicotine gum  · Smoking cessation counseling     Hypercholesterolemia  Assessment & Plan  · Change statin therapy to high-intensity statin therapy with atorvastatin 40 mg daily     Elevated MCV  Assessment & Plan  · Check a vitamin B12 level and folate level     Type 2 diabetes mellitus with hyperglycemia, without long-term current use of insulin (HCC    Polycythemia vera (HCC)  Assessment & Plan  · Continue PO hydroxyurea  · Outpatient follow-up with Hematology/Oncology        VTE Prophylaxis: Enoxaparin (Lovenox)  / sequential compression device   Code Status: Level 1-Full Code     Discussion with family:  I updated the patient's daughter at the bedside  48  Y O female  Presents to ED c/o  Dysuria  And urinary frequency  Has a  H/O  Recurrent UTI  Has  A  H/O previous bladder surgery for  bladdeer prolapse and now has  Recurrent  Bladder prolapse  C/O  Vaginal pain and  S/P pain  Has  Intermittent  Cough and sinus  Pressure and  Was recently treated for  Tracheobronchitis          Anticipated Length of Stay:  Patient will be admitted on an Inpatient basis with an anticipated length of stay of greater than 2 midnights  Justification for Hospital Stay:  The patient requires IV antibiotics, continuous IV fluids, and an ultrasound of the kidneys and bladder completed            ED Vital Signs:   ED Triage Vitals [03/16/19 1229]   Temperature Pulse Respirations Blood Pressure SpO2   98 4 °F (36 9 °C) 91 18 144/70 97 %      Temp Source Heart Rate Source Patient Position - Orthostatic VS BP Location FiO2 (%)   Temporal Monitor Lying Right arm --      Pain Score       6        Wt Readings from Last 1 Encounters:   03/16/19 75 6 kg (166 lb 10 7 oz)     Vital Signs (abnormal):    above    Pertinent Labs/Diagnostic Test Results:    WBC    13 63  Lactic  Acid    2 6  Albumin  2 9  Ct Abd/pelvis:    No  Acute  Abnormality  CXR:  No infiltrate    ED Treatment:   Medication Administration from 03/16/2019 1223 to 03/16/2019 1556       Date/Time Order Dose Route Action Action by Comments     03/16/2019 1330 ondansetron (ZOFRAN) injection 4 mg 4 mg Intravenous Given Bernarda, RN      03/16/2019 1330 ketorolac (TORADOL) injection 15 mg 15 mg Intravenous Given Bernarda, RN      03/16/2019 1441 cefTRIAXone (ROCEPHIN) IVPB (premix) 1,000 mg 1,000 mg Intravenous New Bag Bernarda, RN      03/16/2019 1440 sodium chloride 0 9 % bolus 2,250 mL 2,250 mL Intravenous New Bag Bernarda, RN      03/16/2019 1440 iohexol (OMNIPAQUE) 350 MG/ML injection (MULTI-DOSE) 100 mL 100 mL Intravenous Given Mariel Rdz         Past Medical/Surgical History:    Active Ambulatory Problems     Diagnosis Date Noted    Polycythemia vera ([facility] ) 09/11/2018     Resolved Ambulatory Problems     Diagnosis Date Noted    No Resolved Ambulatory Problems     Past Medical History:   Diagnosis Date    Arthritis     Cancer ([facility] )     Diabetes mellitus ([facility] )     Hyperlipidemia     Hypertension      Admitting Diagnosis: Lactic acidosis [E87 2]  Leukocytosis [D72 829]  UTI (urinary tract infection) [N39 0]  Hyperglycemia [R73 9]  Groin pain [R10 30]  Sepsis ([facility] [address] ) [A41 9]     Age/Sex: 48 y o  female     Admission Orders:   IP    3/16  @  1529  Scheduled Meds:   Current Facility-Administered Medications:  acetaminophen 650 mg Oral Q6H PRN Caryl Diss, DO    aspirin 81 mg Oral Daily Johannah Diss, DO    atorvastatin 40 mg Oral Daily With Ricco Neville, DO    benzonatate 100 mg Oral TID PRN Caryl Diss, DO    cefTRIAXone 1,000 mg Intravenous Q24H Caryl Diss, DO    dextrose 25 mL Intravenous PRN Caryl Diss, DO    enoxaparin 40 mg Subcutaneous Q24H Caryl Diss, DO    hydroxyurea 500 mg Oral After Ricco Neville, DO    insulin lispro 1-5 Units Subcutaneous TID AC Michel Cox,     insulin lispro 1-5 Units Subcutaneous HS Joelene Poll, DO    losartan 25 mg Oral Daily Joelene Poll, DO    oxyCODONE 10 mg Oral Q4H PRN Joelene Poll, DO    oxyCODONE 5 mg Oral Q4H PRN Joelene Poll, DO    sitaGLIPtin 100 mg Oral Daily Joelene Poll, DO    sodium chloride 125 mL/hr Intravenous Continuous Joelene Poll, DO Last Rate: 125 mL/hr (03/17/19 0105)     Continuous Infusions:   sodium chloride 125 mL/hr Last Rate: 125 mL/hr (03/17/19 0105)     PRN Meds:   acetaminophen    benzonatate    dextrose    oxyCODONE    oxyCODONE     CCD  Diet    Network Utilization Review Department  Phone: [phone]; Fax [phone]  [email or web address]  ATTENTION: Please call with any questions or concerns to [phone]  and carefully listen to the prompts so that you are directed to the right person  Send all requests for admission clinical reviews, approved or denied determinations and any other requests to fax [phone]   All voicemails are confidential

## 2019-03-17 NOTE — ASSESSMENT & PLAN NOTE
· Chronic leukocytosis  · Possible chronic lymphocytic leukemia  · Outpatient follow-up with Hematology/Oncology

## 2019-03-17 NOTE — PROGRESS NOTES
Patient went to bathroom to have a bowel movement, noted bright red blood, medium amount  Patient states she usually has minimal bleeding but this was more than she was used to  Juan Rizzo PA-C notified, will continue to monitor

## 2019-03-17 NOTE — ASSESSMENT & PLAN NOTE
· History of recurrent urinary tract infections  · This will be considered a complicated urinary tract infection in the setting of bladder prolapse  · The patient was started on IV ceftriaxone on admission and transitioned to PO keflex at discharge  · Ultrasound of the kidneys and bladder: No evidence of obstructive uropathy  Minor contour irregularity at the anterior urinary bladder consistent with history of pelvic floor reconstruction  Otherwise unremarkable sonographic appearance of the urinary bladder  · Outpatient follow up with PCP

## 2019-03-17 NOTE — DISCHARGE SUMMARY
Discharge- Ahmet Maneor 1965, 48 y o  female MRN: [id number]    Unit/Bed#: 423-01 Encounter: [id number]    Primary Care Provider: Stephy Mendoza DO   Date and time admitted to hospital: 3/16/2019 12:24 PM        * Acute cystitis without hematuria  Assessment & Plan  · History of recurrent urinary tract infections  · This will be considered a complicated urinary tract infection in the setting of bladder prolapse  · The patient was started on IV ceftriaxone on admission and transitioned to PO keflex at discharge  · Ultrasound of the kidneys and bladder: No evidence of obstructive uropathy  Minor contour irregularity at the anterior urinary bladder consistent with history of pelvic floor reconstruction  Otherwise unremarkable sonographic appearance of the urinary bladder  · Outpatient follow up with PCP  GI bleed  Assessment & Plan  · Nursing and patient reports bleeding, patient states she thought she had a bowel movement and it was all blood  · The patient states she follows GI and had colonoscopy in the past and was told she has diverticulosis and hemorrhoids  · She states the bleeding is more than it normally is  · Hemoglobin on admission noted to be 15 4 which decreased to 13 9 today  · The patient was had no episodes of bleeding  · The patient was discharged home with a script for a CBC in 2 days  · Outpatient follow up with GI and PCP  Female bladder prolapse  Assessment & Plan  · Ultrasound of the kidneys and bladder: No evidence of obstructive uropathy  Minor contour irregularity at the anterior urinary bladder consistent with history of pelvic floor reconstruction    Otherwise unremarkable sonographic appearance of the urinary bladder  · Outpatient follow-up with Urology    Lactic acidosis  Assessment & Plan  · Resolved with IV fluids  · Thiamine 200 mg IV x 1 dose  · Discontinue metformin indefinitely    Leukocytosis  Assessment & Plan  · Chronic leukocytosis  · Possible chronic lymphocytic leukemia  · Outpatient follow-up with Hematology/Oncology    Tobacco abuse  Assessment & Plan  · The patient has an allergy to the nicotine patch and nicotine gum  · Smoking cessation counseling    Hypercholesterolemia  Assessment & Plan  · Resume statin therapy    Elevated MCV  Assessment & Plan  · Vitamin B12 level and folate levels are within normal limits  Type 2 diabetes mellitus with hyperglycemia, without long-term current use of insulin [facility])  Assessment & Plan  Lab Results   Component Value Date    HGBA1C 11 3 (H) 03/17/2019       Recent Labs     03/16/19  1640 03/16/19  2046 03/17/19  0746 03/17/19  1136   POCGLU 183* 251* 218* 274*       Blood Sugar Average: Last 72 hrs:  (P) 231 5     · The patient has not had her metformin in several weeks  · I recommend discontinuing the metformin indefinitely in the setting of lactic acidosis  · Continue januvia 100 mg PO Qdaily  · Continue losartan for renal protection in the setting of type 2 diabetes mellitus  · Outpatient follow up with PCP  Polycythemia vera ([facility] )  Assessment & Plan  · Continue PO hydroxyurea  · Outpatient follow-up with Hematology/Oncology          Discharging Physician / Practitioner: Marco Damian PA-C  PCP: Valerie Loya DO  Admission Date:   Admission Orders (From admission, onward)    Ordered        03/16/19 1529  Inpatient Admission  Once     Order ID Start Status   [id number] 03/16/19 1529 Completed              Discharge Date: 03/17/19    Resolved Problems  Date Reviewed: 3/17/2019    None          Consultations During Hospital Stay:  · none    Procedures Performed:   · none    Significant Findings / Test Results:   · Chest x-ray: No acute cardiopulmonary disease  · CT abdomen pelvis:No acute abnormality  Hepatic steatosis  · US kidney and bladder:  No evidence of obstructive uropathy  Minor contour irregularity at the anterior urinary bladder consistent with history of pelvic floor reconstruction    Otherwise unremarkable sonographic appearance of the urinary bladder  Incidental Findings:   · none     Test Results Pending at Discharge (will require follow up):   · none     Outpatient Tests Requested:  · none    Complications:  none    Reason for Admission: UTI    Hospital Course: Elie Siemens is a 48 y o  female patient who originally presented to the hospital on 3/16/2019 due to dysuria and urinary frequency  The patient has a history of recurrent urinary tract infections  In addition, the patient had previous bladder surgery bladder prolapse and is now experiencing bladder prolapse again  Over the last week, she has been experiencing worsening dysuria and urinary frequency  She is complaining of vaginal pain as well  In addition, she is experiencing suprapubic abdominal pain  Nothing seemed to relieve her symptoms  She was recently treated for a tracheobronchitis, and she continues to complain of an intermittent cough as well as sinus pressure  No fevers or chills  No nausea or vomiting  The patient, initially admitted to the hospital as inpatient, was discharged earlier than expected given the following: the patient improved quicker than expected       Please see above list of diagnoses and related plan for additional information  Condition at Discharge: good     Discharge Day Visit / Exam:     * Please refer to separate progress note for these details *        Discharge instructions/Information to patient and family:   See after visit summary for information provided to patient and family  Provisions for Follow-Up Care:  See after visit summary for information related to follow-up care and any pertinent home health orders  Disposition:     Home    For Discharges to [facility] SNF:   · Not Applicable to this Patient - Not Applicable to this Patient    Planned Readmission: no     Discharge Statement:  I spent 25 minutes discharging the patient   This time was spent on the day of discharge  I had direct contact with the patient on the day of discharge  Greater than 50% of the total time was spent examining patient, answering all patient questions, arranging and discussing plan of care with patient as well as directly providing post-discharge instructions  Additional time then spent on discharge activities  Discharge Medications:  See after visit summary for reconciled discharge medications provided to patient and family        ** Please Note: This note has been constructed using a voice recognition system **

## 2019-03-18 ENCOUNTER — TRANSITIONAL CARE MANAGEMENT (OUTPATIENT)
Dept: FAMILY MEDICINE CLINIC | Facility: CLINIC | Age: 54
End: 2019-03-18

## 2019-03-18 LAB
BACTERIA UR CULT: ABNORMAL
HAV IGM SER QL: NORMAL
HBV CORE IGM SER QL: NORMAL
HBV SURFACE AG SER QL: NORMAL
HCV AB SER QL: NORMAL

## 2019-03-19 ENCOUNTER — APPOINTMENT (OUTPATIENT)
Dept: LAB | Facility: MEDICAL CENTER | Age: 54
End: 2019-03-19
Payer: COMMERCIAL

## 2019-03-19 DIAGNOSIS — K92.2 GASTROINTESTINAL HEMORRHAGE, UNSPECIFIED GASTROINTESTINAL HEMORRHAGE TYPE: ICD-10-CM

## 2019-03-19 LAB
ERYTHROCYTE [DISTWIDTH] IN BLOOD BY AUTOMATED COUNT: 12.8 % (ref 11.6–15.1)
HCT VFR BLD AUTO: 48.2 % (ref 34.8–46.1)
HGB BLD-MCNC: 15.7 G/DL (ref 11.5–15.4)
MCH RBC QN AUTO: 32.6 PG (ref 26.8–34.3)
MCHC RBC AUTO-ENTMCNC: 32.6 G/DL (ref 31.4–37.4)
MCV RBC AUTO: 100 FL (ref 82–98)
PLATELET # BLD AUTO: 254 THOUSANDS/UL (ref 149–390)
PMV BLD AUTO: 10.7 FL (ref 8.9–12.7)
RBC # BLD AUTO: 4.81 MILLION/UL (ref 3.81–5.12)
WBC # BLD AUTO: 12.01 THOUSAND/UL (ref 4.31–10.16)

## 2019-03-19 PROCEDURE — 36415 COLL VENOUS BLD VENIPUNCTURE: CPT

## 2019-03-19 PROCEDURE — 85027 COMPLETE CBC AUTOMATED: CPT

## 2019-03-21 LAB
BACTERIA BLD CULT: NORMAL
BACTERIA BLD CULT: NORMAL

## 2019-03-22 ENCOUNTER — TELEPHONE (OUTPATIENT)
Dept: FAMILY MEDICINE CLINIC | Facility: CLINIC | Age: 54
End: 2019-03-22

## 2019-03-22 NOTE — TELEPHONE ENCOUNTER
Her blood sugar has not been under 200   she is on [place] and [place] but was taken off metformin, she is allergic to metformin    Today 245

## 2019-03-26 ENCOUNTER — OFFICE VISIT (OUTPATIENT)
Dept: FAMILY MEDICINE CLINIC | Facility: CLINIC | Age: 54
End: 2019-03-26
Payer: COMMERCIAL

## 2019-03-26 ENCOUNTER — APPOINTMENT (OUTPATIENT)
Dept: LAB | Facility: MEDICAL CENTER | Age: 54
End: 2019-03-26
Payer: COMMERCIAL

## 2019-03-26 VITALS
WEIGHT: 166.6 LBS | HEIGHT: 63 IN | DIASTOLIC BLOOD PRESSURE: 80 MMHG | SYSTOLIC BLOOD PRESSURE: 130 MMHG | BODY MASS INDEX: 29.52 KG/M2

## 2019-03-26 DIAGNOSIS — E78.00 HYPERCHOLESTEROLEMIA: ICD-10-CM

## 2019-03-26 DIAGNOSIS — E11.65 TYPE 2 DIABETES MELLITUS WITH HYPERGLYCEMIA, WITHOUT LONG-TERM CURRENT USE OF INSULIN (HCC): Primary | ICD-10-CM

## 2019-03-26 DIAGNOSIS — D72.829 ELEVATED WHITE BLOOD CELL COUNT: ICD-10-CM

## 2019-03-26 DIAGNOSIS — I10 ESSENTIAL HYPERTENSION: ICD-10-CM

## 2019-03-26 PROBLEM — K76.0 HEPATIC STEATOSIS: Status: ACTIVE | Noted: 2017-08-15

## 2019-03-26 LAB
ALBUMIN SERPL BCP-MCNC: 3.5 G/DL (ref 3.5–5)
ALP SERPL-CCNC: 108 U/L (ref 46–116)
ALT SERPL W P-5'-P-CCNC: 28 U/L (ref 12–78)
ANION GAP SERPL CALCULATED.3IONS-SCNC: 6 MMOL/L (ref 4–13)
AST SERPL W P-5'-P-CCNC: 17 U/L (ref 5–45)
BASOPHILS # BLD AUTO: 0.13 THOUSANDS/ΜL (ref 0–0.1)
BASOPHILS NFR BLD AUTO: 1 % (ref 0–1)
BILIRUB SERPL-MCNC: 0.62 MG/DL (ref 0.2–1)
BUN SERPL-MCNC: 13 MG/DL (ref 5–25)
CALCIUM SERPL-MCNC: 9 MG/DL (ref 8.3–10.1)
CHLORIDE SERPL-SCNC: 105 MMOL/L (ref 100–108)
CO2 SERPL-SCNC: 25 MMOL/L (ref 21–32)
CREAT SERPL-MCNC: 0.96 MG/DL (ref 0.6–1.3)
CREAT UR-MCNC: 60.5 MG/DL
EOSINOPHIL # BLD AUTO: 0.18 THOUSAND/ΜL (ref 0–0.61)
EOSINOPHIL NFR BLD AUTO: 1 % (ref 0–6)
ERYTHROCYTE [DISTWIDTH] IN BLOOD BY AUTOMATED COUNT: 13 % (ref 11.6–15.1)
GFR SERPL CREATININE-BSD FRML MDRD: 68 ML/MIN/1.73SQ M
GLUCOSE SERPL-MCNC: 359 MG/DL (ref 65–140)
HCT VFR BLD AUTO: 48.9 % (ref 34.8–46.1)
HGB BLD-MCNC: 15.8 G/DL (ref 11.5–15.4)
IMM GRANULOCYTES # BLD AUTO: 0.11 THOUSAND/UL (ref 0–0.2)
IMM GRANULOCYTES NFR BLD AUTO: 1 % (ref 0–2)
LYMPHOCYTES # BLD AUTO: 3.49 THOUSANDS/ΜL (ref 0.6–4.47)
LYMPHOCYTES NFR BLD AUTO: 23 % (ref 14–44)
MCH RBC QN AUTO: 32.3 PG (ref 26.8–34.3)
MCHC RBC AUTO-ENTMCNC: 32.3 G/DL (ref 31.4–37.4)
MCV RBC AUTO: 100 FL (ref 82–98)
MICROALBUMIN UR-MCNC: 7.6 MG/L (ref 0–20)
MICROALBUMIN/CREAT 24H UR: 13 MG/G CREATININE (ref 0–30)
MONOCYTES # BLD AUTO: 0.6 THOUSAND/ΜL (ref 0.17–1.22)
MONOCYTES NFR BLD AUTO: 4 % (ref 4–12)
NEUTROPHILS # BLD AUTO: 10.66 THOUSANDS/ΜL (ref 1.85–7.62)
NEUTS SEG NFR BLD AUTO: 70 % (ref 43–75)
NRBC BLD AUTO-RTO: 0 /100 WBCS
PLATELET # BLD AUTO: 391 THOUSANDS/UL (ref 149–390)
PMV BLD AUTO: 10.2 FL (ref 8.9–12.7)
POTASSIUM SERPL-SCNC: 4 MMOL/L (ref 3.5–5.3)
PROT SERPL-MCNC: 7.9 G/DL (ref 6.4–8.2)
RBC # BLD AUTO: 4.89 MILLION/UL (ref 3.81–5.12)
SODIUM SERPL-SCNC: 136 MMOL/L (ref 136–145)
WBC # BLD AUTO: 15.17 THOUSAND/UL (ref 4.31–10.16)

## 2019-03-26 PROCEDURE — 3061F NEG MICROALBUMINURIA REV: CPT | Performed by: FAMILY MEDICINE

## 2019-03-26 PROCEDURE — 3075F SYST BP GE 130 - 139MM HG: CPT | Performed by: FAMILY MEDICINE

## 2019-03-26 PROCEDURE — 3008F BODY MASS INDEX DOCD: CPT | Performed by: FAMILY MEDICINE

## 2019-03-26 PROCEDURE — 36415 COLL VENOUS BLD VENIPUNCTURE: CPT

## 2019-03-26 PROCEDURE — 99214 OFFICE O/P EST MOD 30 MIN: CPT | Performed by: FAMILY MEDICINE

## 2019-03-26 PROCEDURE — 82043 UR ALBUMIN QUANTITATIVE: CPT | Performed by: FAMILY MEDICINE

## 2019-03-26 PROCEDURE — 85025 COMPLETE CBC W/AUTO DIFF WBC: CPT | Performed by: INTERNAL MEDICINE

## 2019-03-26 PROCEDURE — 3079F DIAST BP 80-89 MM HG: CPT | Performed by: FAMILY MEDICINE

## 2019-03-26 PROCEDURE — 80053 COMPREHEN METABOLIC PANEL: CPT

## 2019-03-26 PROCEDURE — 82570 ASSAY OF URINE CREATININE: CPT | Performed by: FAMILY MEDICINE

## 2019-03-26 PROCEDURE — 1111F DSCHRG MED/CURRENT MED MERGE: CPT | Performed by: FAMILY MEDICINE

## 2019-03-26 NOTE — PROGRESS NOTES
Assessment/Plan: For her diabetes, we will start her on Jardiance 10 mg daily  She will make sure she pushes fluids  Continue Januvia  Continue to watch carbohydrate intake  We will check a CBC and urine microalbumin  Follow-up here in 3 months or p r n     Follow up with specialists as scheduled  No problem-specific Assessment & Plan notes found for this encounter  Diagnoses and all orders for this visit:    Type 2 diabetes mellitus with hyperglycemia, without long-term current use of insulin (HCC)  -     Microalbumin / creatinine urine ratio  -     Empagliflozin 10 MG TABS; Take 1 tablet (10 mg total) by mouth every morning    Essential hypertension  -     Cancel: CBC and differential    Hypercholesterolemia          Subjective:      Patient ID: Ada Wilkerson is a 48 y o  female  Patient here today for follow-up  Patient was admitted for UTI  Found to be in lactic acidosis, metformin was stopped  Hemoglobin A1c was very high at 11  Patient denies any chest pain or shortness of breath  She is following up with Urology  Diabetes   She presents for her follow-up diabetic visit  She has type 2 diabetes mellitus  Her disease course has been worsening  There are no hypoglycemic associated symptoms  There are no diabetic associated symptoms  There are no hypoglycemic complications  There are no diabetic complications  Risk factors for coronary artery disease include diabetes mellitus, hypertension, sedentary lifestyle and tobacco exposure  Current diabetic treatment includes oral agent (monotherapy)  She is compliant with treatment all of the time  Her weight is stable  She is following a generally healthy diet  When asked about meal planning, she reported none  She never participates in exercise  Her home blood glucose trend is increasing steadily  An ACE inhibitor/angiotensin II receptor blocker is being taken  She sees a podiatrist Eye exam is current         The following portions of the patient's history were reviewed and updated as appropriate:   She  has a past medical history of Arthritis, Cancer ([facility] ), Diabetes mellitus ([facility] ), Hyperlipidemia, and Hypertension  She   Patient Active Problem List    Diagnosis Date Noted    GI bleed 03/17/2019    Type 2 diabetes mellitus with hyperglycemia, without long-term current use of insulin ([facility] ) 03/16/2019    Acute cystitis without hematuria 03/16/2019    Lactic acidosis 03/16/2019    Elevated MCV 03/16/2019    Hypercholesterolemia 03/16/2019    Female bladder prolapse 03/16/2019    Tobacco abuse 03/16/2019    Leukocytosis 03/16/2019    Polycythemia vera ([facility] ) 09/11/2018    Hepatic steatosis 08/15/2017    Essential hypertension 10/21/2013     She  has a past surgical history that includes Hysterectomy; Reduction mammaplasty; Appendectomy; Cholecystectomy; Bladder augmentation; Ong tooth extraction; Knee Arthroplasty; Knee arthroscopy (Bilateral); Hernia repair; Shoulder surgery; and pr colonoscopy flx dx w/collj spec when pfrmd (N/A, 1/23/2018)  Her family history includes Cancer in her mother; Diabetes in her father; Heart disease in her father  She  reports that she has been smoking cigarettes  She has been smoking about 1 00 pack per day  She has never used smokeless tobacco  She reports that she does not drink alcohol or use drugs    Current Outpatient Medications   Medication Sig Dispense Refill    aspirin (ECOTRIN LOW STRENGTH) 81 mg EC tablet Take 81 mg by mouth daily      fluticasone (FLONASE) 50 mcg/act nasal spray 2 sprays into each nostril as needed       losartan (COZAAR) 25 mg tablet Take 1 tablet by mouth daily      simvastatin (ZOCOR) 20 mg tablet Take 1 tablet by mouth daily at bedtime 90 tablet 0    sitaGLIPtin (JANUVIA) 100 mg tablet Take 1 tablet by mouth daily 30 tablet 0    Empagliflozin 10 MG TABS Take 1 tablet (10 mg total) by mouth every morning 30 tablet 1     No current facility-administered medications for this visit  Current Outpatient Medications on File Prior to Visit   Medication Sig    aspirin (ECOTRIN LOW STRENGTH) 81 mg EC tablet Take 81 mg by mouth daily    fluticasone (FLONASE) 50 mcg/act nasal spray 2 sprays into each nostril as needed     losartan (COZAAR) 25 mg tablet Take 1 tablet by mouth daily    simvastatin (ZOCOR) 20 mg tablet Take 1 tablet by mouth daily at bedtime    sitaGLIPtin (JANUVIA) 100 mg tablet Take 1 tablet by mouth daily    [DISCONTINUED] hydroxyurea (HYDREA) 500 mg capsule Take 1 capsule (500 mg total) by mouth daily (Patient taking differently: Take 500 mg by mouth daily Takes at 530PM)    [DISCONTINUED] benzonatate (TESSALON PERLES) 100 mg capsule Take 1 capsule (100 mg total) by mouth 3 (three) times a day as needed for cough (Patient not taking: Reported on 3/26/2019)     No current facility-administered medications on file prior to visit  She is allergic to chantix [varenicline]; metformin; wellbutrin [bupropion]; clomid [clomiphene]; and latex       Review of Systems   Constitutional: Negative  Respiratory: Negative  Cardiovascular: Negative  Gastrointestinal: Negative  Genitourinary:        As per HPI         Objective:      /80   Ht 5' 3" (1 6 m)   Wt 75 6 kg (166 lb 9 6 oz)   BMI 29 51 kg/m²          Physical Exam   Constitutional: She is oriented to person, place, and time  She appears well-developed and well-nourished  No distress  HENT:   Head: Normocephalic and atraumatic  Eyes: Conjunctivae are normal    Cardiovascular: Normal rate, regular rhythm and normal heart sounds  Exam reveals no gallop and no friction rub  No murmur heard  Pulmonary/Chest: Effort normal and breath sounds normal  No respiratory distress  She has no wheezes  She has no rales  Musculoskeletal: She exhibits no edema  Neurological: She is alert and oriented to person, place, and time  Skin: She is not diaphoretic     Psychiatric: She has a normal mood and affect  Her behavior is normal  Judgment and thought content normal    Vitals reviewed

## 2019-03-27 DIAGNOSIS — J30.1 SEASONAL ALLERGIC RHINITIS DUE TO POLLEN: ICD-10-CM

## 2019-03-27 DIAGNOSIS — E78.2 MIXED HYPERLIPIDEMIA: ICD-10-CM

## 2019-03-27 DIAGNOSIS — E11.9 TYPE 2 DIABETES MELLITUS WITHOUT COMPLICATION, WITHOUT LONG-TERM CURRENT USE OF INSULIN (HCC): ICD-10-CM

## 2019-03-27 DIAGNOSIS — I10 ESSENTIAL HYPERTENSION: Primary | ICD-10-CM

## 2019-03-27 PROCEDURE — 4010F ACE/ARB THERAPY RXD/TAKEN: CPT | Performed by: FAMILY MEDICINE

## 2019-03-27 RX ORDER — FLUTICASONE PROPIONATE 50 MCG
2 SPRAY, SUSPENSION (ML) NASAL DAILY
Qty: 1 BOTTLE | Refills: 5 | Status: SHIPPED | OUTPATIENT
Start: 2019-03-27 | End: 2021-04-19 | Stop reason: SDUPTHER

## 2019-03-27 RX ORDER — SIMVASTATIN 20 MG
20 TABLET ORAL
Qty: 90 TABLET | Refills: 3 | Status: SHIPPED | OUTPATIENT
Start: 2019-03-27 | End: 2020-04-20

## 2019-03-27 RX ORDER — LOSARTAN POTASSIUM 25 MG/1
25 TABLET ORAL DAILY
Qty: 90 TABLET | Refills: 3 | Status: SHIPPED | OUTPATIENT
Start: 2019-03-27 | End: 2020-04-20 | Stop reason: SDUPTHER

## 2019-04-01 ENCOUNTER — TELEPHONE (OUTPATIENT)
Dept: FAMILY MEDICINE CLINIC | Facility: CLINIC | Age: 54
End: 2019-04-01

## 2019-04-01 DIAGNOSIS — E11.65 TYPE 2 DIABETES MELLITUS WITH HYPERGLYCEMIA, WITHOUT LONG-TERM CURRENT USE OF INSULIN (HCC): Primary | ICD-10-CM

## 2019-04-02 ENCOUNTER — TELEPHONE (OUTPATIENT)
Dept: FAMILY MEDICINE CLINIC | Facility: CLINIC | Age: 54
End: 2019-04-02

## 2019-04-02 DIAGNOSIS — E11.65 TYPE 2 DIABETES MELLITUS WITH HYPERGLYCEMIA, WITHOUT LONG-TERM CURRENT USE OF INSULIN (HCC): Primary | ICD-10-CM

## 2019-04-02 DIAGNOSIS — E11.65 TYPE 2 DIABETES MELLITUS WITH HYPERGLYCEMIA, WITHOUT LONG-TERM CURRENT USE OF INSULIN (HCC): ICD-10-CM

## 2019-04-08 ENCOUNTER — TELEPHONE (OUTPATIENT)
Dept: FAMILY MEDICINE CLINIC | Facility: CLINIC | Age: 54
End: 2019-04-08

## 2019-04-09 ENCOUNTER — OFFICE VISIT (OUTPATIENT)
Dept: HEMATOLOGY ONCOLOGY | Facility: HOSPITAL | Age: 54
End: 2019-04-09
Payer: COMMERCIAL

## 2019-04-09 VITALS
DIASTOLIC BLOOD PRESSURE: 74 MMHG | HEIGHT: 63 IN | RESPIRATION RATE: 17 BRPM | TEMPERATURE: 98.1 F | HEART RATE: 96 BPM | WEIGHT: 163.8 LBS | OXYGEN SATURATION: 96 % | SYSTOLIC BLOOD PRESSURE: 134 MMHG | BODY MASS INDEX: 29.02 KG/M2

## 2019-04-09 DIAGNOSIS — D45 POLYCYTHEMIA VERA (HCC): Primary | ICD-10-CM

## 2019-04-09 PROCEDURE — 99214 OFFICE O/P EST MOD 30 MIN: CPT | Performed by: INTERNAL MEDICINE

## 2019-04-09 RX ORDER — HYDROXYUREA 500 MG/1
CAPSULE ORAL
Qty: 45 CAPSULE | Refills: 5 | Status: SHIPPED | OUTPATIENT
Start: 2019-04-09 | End: 2019-05-02 | Stop reason: SDUPTHER

## 2019-04-10 ENCOUNTER — APPOINTMENT (OUTPATIENT)
Dept: RADIOLOGY | Facility: CLINIC | Age: 54
End: 2019-04-10
Payer: COMMERCIAL

## 2019-04-10 ENCOUNTER — OFFICE VISIT (OUTPATIENT)
Dept: URGENT CARE | Facility: CLINIC | Age: 54
End: 2019-04-10
Payer: COMMERCIAL

## 2019-04-10 VITALS
SYSTOLIC BLOOD PRESSURE: 133 MMHG | OXYGEN SATURATION: 96 % | HEIGHT: 63 IN | RESPIRATION RATE: 18 BRPM | DIASTOLIC BLOOD PRESSURE: 60 MMHG | HEART RATE: 104 BPM | BODY MASS INDEX: 28.88 KG/M2 | WEIGHT: 163 LBS | TEMPERATURE: 97.4 F

## 2019-04-10 DIAGNOSIS — M79.675 TOE PAIN, LEFT: ICD-10-CM

## 2019-04-10 DIAGNOSIS — M79.675 TOE PAIN, LEFT: Primary | ICD-10-CM

## 2019-04-10 PROCEDURE — 73660 X-RAY EXAM OF TOE(S): CPT

## 2019-04-10 PROCEDURE — 99284 EMERGENCY DEPT VISIT MOD MDM: CPT

## 2019-04-10 PROCEDURE — G0383 LEV 4 HOSP TYPE B ED VISIT: HCPCS

## 2019-04-10 PROCEDURE — 99214 OFFICE O/P EST MOD 30 MIN: CPT

## 2019-04-10 RX ORDER — MELOXICAM 7.5 MG/1
7.5 TABLET ORAL DAILY
Qty: 30 TABLET | Refills: 0 | Status: SHIPPED | OUTPATIENT
Start: 2019-04-10 | End: 2022-05-26

## 2019-04-11 ENCOUNTER — OFFICE VISIT (OUTPATIENT)
Dept: CARDIOLOGY CLINIC | Facility: CLINIC | Age: 54
End: 2019-04-11
Payer: COMMERCIAL

## 2019-04-11 ENCOUNTER — TELEPHONE (OUTPATIENT)
Dept: FAMILY MEDICINE CLINIC | Facility: CLINIC | Age: 54
End: 2019-04-11

## 2019-04-11 VITALS
DIASTOLIC BLOOD PRESSURE: 78 MMHG | HEIGHT: 63 IN | HEART RATE: 98 BPM | WEIGHT: 162 LBS | BODY MASS INDEX: 28.7 KG/M2 | SYSTOLIC BLOOD PRESSURE: 118 MMHG

## 2019-04-11 DIAGNOSIS — E78.00 HYPERCHOLESTEROLEMIA: ICD-10-CM

## 2019-04-11 DIAGNOSIS — Z72.0 TOBACCO USE: ICD-10-CM

## 2019-04-11 DIAGNOSIS — I73.9 PVD (PERIPHERAL VASCULAR DISEASE) (HCC): ICD-10-CM

## 2019-04-11 DIAGNOSIS — I10 ESSENTIAL HYPERTENSION: Primary | ICD-10-CM

## 2019-04-11 PROCEDURE — 93000 ELECTROCARDIOGRAM COMPLETE: CPT | Performed by: INTERNAL MEDICINE

## 2019-04-11 PROCEDURE — 99204 OFFICE O/P NEW MOD 45 MIN: CPT | Performed by: INTERNAL MEDICINE

## 2019-04-26 ENCOUNTER — TELEPHONE (OUTPATIENT)
Dept: FAMILY MEDICINE CLINIC | Facility: CLINIC | Age: 54
End: 2019-04-26

## 2019-04-26 DIAGNOSIS — R11.0 NAUSEA: Primary | ICD-10-CM

## 2019-04-26 RX ORDER — ONDANSETRON 4 MG/1
4 TABLET, ORALLY DISINTEGRATING ORAL EVERY 6 HOURS PRN
Qty: 20 TABLET | Refills: 0 | Status: SHIPPED | OUTPATIENT
Start: 2019-04-26 | End: 2022-01-17 | Stop reason: SDUPTHER

## 2019-04-29 ENCOUNTER — TELEPHONE (OUTPATIENT)
Dept: FAMILY MEDICINE CLINIC | Facility: CLINIC | Age: 54
End: 2019-04-29

## 2019-04-29 DIAGNOSIS — J06.9 UPPER RESPIRATORY TRACT INFECTION, UNSPECIFIED TYPE: Primary | ICD-10-CM

## 2019-04-29 RX ORDER — AZITHROMYCIN 250 MG/1
TABLET, FILM COATED ORAL
Qty: 6 TABLET | Refills: 0 | Status: SHIPPED | OUTPATIENT
Start: 2019-04-29 | End: 2019-05-03

## 2019-05-02 ENCOUNTER — TELEPHONE (OUTPATIENT)
Dept: HEMATOLOGY ONCOLOGY | Facility: CLINIC | Age: 54
End: 2019-05-02

## 2019-05-02 DIAGNOSIS — D45 POLYCYTHEMIA VERA (HCC): ICD-10-CM

## 2019-05-03 RX ORDER — HYDROXYUREA 500 MG/1
CAPSULE ORAL
Qty: 45 CAPSULE | Refills: 5 | Status: SHIPPED | OUTPATIENT
Start: 2019-05-03 | End: 2019-10-28 | Stop reason: SDUPTHER

## 2019-05-06 ENCOUNTER — HOSPITAL ENCOUNTER (OUTPATIENT)
Dept: NON INVASIVE DIAGNOSTICS | Facility: CLINIC | Age: 54
Discharge: HOME | End: 2019-05-06
Payer: COMMERCIAL

## 2019-05-06 ENCOUNTER — TRANSCRIBE ORDERS (OUTPATIENT)
Dept: VASCULAR SURGERY | Facility: CLINIC | Age: 54
End: 2019-05-06

## 2019-05-06 ENCOUNTER — TRANSCRIBE ORDERS (OUTPATIENT)
Dept: CARDIOLOGY CLINIC | Facility: CLINIC | Age: 54
End: 2019-05-06

## 2019-05-06 DIAGNOSIS — I73.9 PVD (PERIPHERAL VASCULAR DISEASE) (HCC): ICD-10-CM

## 2019-05-06 DIAGNOSIS — I73.9 PERIPHERAL VASCULAR DISEASE (HCC): Primary | ICD-10-CM

## 2019-05-06 PROCEDURE — 93922 UPR/L XTREMITY ART 2 LEVELS: CPT | Performed by: SURGERY

## 2019-05-06 PROCEDURE — 93925 LOWER EXTREMITY STUDY: CPT | Performed by: SURGERY

## 2019-05-06 PROCEDURE — 93923 UPR/LXTR ART STDY 3+ LVLS: CPT

## 2019-05-06 PROCEDURE — 93925 LOWER EXTREMITY STUDY: CPT

## 2019-05-15 ENCOUNTER — CONSULT (OUTPATIENT)
Dept: VASCULAR SURGERY | Facility: HOSPITAL | Age: 54
End: 2019-05-15
Payer: COMMERCIAL

## 2019-05-15 VITALS
HEART RATE: 92 BPM | SYSTOLIC BLOOD PRESSURE: 120 MMHG | DIASTOLIC BLOOD PRESSURE: 62 MMHG | TEMPERATURE: 97.8 F | BODY MASS INDEX: 28.35 KG/M2 | WEIGHT: 160 LBS | HEIGHT: 63 IN

## 2019-05-15 DIAGNOSIS — I73.9 PERIPHERAL VASCULAR DISEASE (HCC): ICD-10-CM

## 2019-05-15 DIAGNOSIS — I74.09 AORTOILIAC OCCLUSIVE DISEASE (HCC): ICD-10-CM

## 2019-05-15 DIAGNOSIS — E78.00 HYPERCHOLESTEROLEMIA: ICD-10-CM

## 2019-05-15 DIAGNOSIS — I10 ESSENTIAL HYPERTENSION: ICD-10-CM

## 2019-05-15 DIAGNOSIS — Z72.0 TOBACCO USE: Primary | ICD-10-CM

## 2019-05-15 DIAGNOSIS — E11.65 TYPE 2 DIABETES MELLITUS WITH HYPERGLYCEMIA, WITHOUT LONG-TERM CURRENT USE OF INSULIN (HCC): ICD-10-CM

## 2019-05-15 PROCEDURE — 99244 OFF/OP CNSLTJ NEW/EST MOD 40: CPT | Performed by: NURSE PRACTITIONER

## 2019-05-24 ENCOUNTER — HOSPITAL ENCOUNTER (OUTPATIENT)
Facility: HOSPITAL | Age: 54
Setting detail: OBSERVATION
Discharge: HOME | End: 2019-05-25
Attending: EMERGENCY MEDICINE | Admitting: INTERNAL MEDICINE
Payer: COMMERCIAL

## 2019-05-24 ENCOUNTER — APPOINTMENT (EMERGENCY)
Dept: NON INVASIVE DIAGNOSTICS | Facility: HOSPITAL | Age: 54
End: 2019-05-24
Payer: COMMERCIAL

## 2019-05-24 ENCOUNTER — APPOINTMENT (EMERGENCY)
Dept: CT IMAGING | Facility: HOSPITAL | Age: 54
End: 2019-05-24
Payer: COMMERCIAL

## 2019-05-24 ENCOUNTER — APPOINTMENT (EMERGENCY)
Dept: RADIOLOGY | Facility: HOSPITAL | Age: 54
End: 2019-05-24
Payer: COMMERCIAL

## 2019-05-24 DIAGNOSIS — E13.65 OTHER SPECIFIED DIABETES MELLITUS WITH HYPERGLYCEMIA (HCC): ICD-10-CM

## 2019-05-24 DIAGNOSIS — R91.8 ABNORMAL CT SCAN OF LUNG: ICD-10-CM

## 2019-05-24 DIAGNOSIS — R07.89 ATYPICAL CHEST PAIN: Primary | ICD-10-CM

## 2019-05-24 DIAGNOSIS — Z72.0 TOBACCO ABUSE: ICD-10-CM

## 2019-05-24 PROBLEM — R07.9 CHEST PAIN WITH MODERATE RISK FOR CARDIAC ETIOLOGY: Status: ACTIVE | Noted: 2019-05-24

## 2019-05-24 PROBLEM — C95.10 CHRONIC LEUKEMIA (HCC): Status: ACTIVE | Noted: 2019-05-24

## 2019-05-24 LAB
ALBUMIN SERPL BCP-MCNC: 3.2 G/DL (ref 3.5–5)
ALP SERPL-CCNC: 101 U/L (ref 46–116)
ALT SERPL W P-5'-P-CCNC: 26 U/L (ref 12–78)
ANION GAP SERPL CALCULATED.3IONS-SCNC: 10 MMOL/L (ref 4–13)
APTT PPP: 28 SECONDS (ref 26–38)
AST SERPL W P-5'-P-CCNC: 10 U/L (ref 5–45)
BASOPHILS # BLD MANUAL: 0 THOUSAND/UL (ref 0–0.1)
BASOPHILS NFR MAR MANUAL: 0 % (ref 0–1)
BILIRUB SERPL-MCNC: 0.3 MG/DL (ref 0.2–1)
BUN SERPL-MCNC: 15 MG/DL (ref 5–25)
CALCIUM SERPL-MCNC: 9.4 MG/DL (ref 8.3–10.1)
CHLORIDE SERPL-SCNC: 103 MMOL/L (ref 100–108)
CO2 SERPL-SCNC: 29 MMOL/L (ref 21–32)
CREAT SERPL-MCNC: 0.91 MG/DL (ref 0.6–1.3)
EOSINOPHIL # BLD MANUAL: 0 THOUSAND/UL (ref 0–0.4)
EOSINOPHIL NFR BLD MANUAL: 0 % (ref 0–6)
ERYTHROCYTE [DISTWIDTH] IN BLOOD BY AUTOMATED COUNT: 15 % (ref 11.6–15.1)
GFR SERPL CREATININE-BSD FRML MDRD: 72 ML/MIN/1.73SQ M
GLUCOSE SERPL-MCNC: 117 MG/DL (ref 65–140)
GLUCOSE SERPL-MCNC: 223 MG/DL (ref 65–140)
HCT VFR BLD AUTO: 48.4 % (ref 34.8–46.1)
HGB BLD-MCNC: 15.8 G/DL (ref 11.5–15.4)
INR PPP: 0.89 (ref 0.86–1.17)
LACTATE SERPL-SCNC: 1.5 MMOL/L (ref 0.5–2)
LYMPHOCYTES # BLD AUTO: 36 % (ref 14–44)
LYMPHOCYTES # BLD AUTO: 4.76 THOUSAND/UL (ref 0.6–4.47)
MAGNESIUM SERPL-MCNC: 1.9 MG/DL (ref 1.6–2.6)
MCH RBC QN AUTO: 33.8 PG (ref 26.8–34.3)
MCHC RBC AUTO-ENTMCNC: 32.6 G/DL (ref 31.4–37.4)
MCV RBC AUTO: 104 FL (ref 82–98)
MONOCYTES # BLD AUTO: 0.13 THOUSAND/UL (ref 0–1.22)
MONOCYTES NFR BLD: 1 % (ref 4–12)
NEUTROPHILS # BLD MANUAL: 8.19 THOUSAND/UL (ref 1.85–7.62)
NEUTS SEG NFR BLD AUTO: 62 % (ref 43–75)
NRBC BLD AUTO-RTO: 0 /100 WBCS
NT-PROBNP SERPL-MCNC: 78 PG/ML
PLATELET # BLD AUTO: 305 THOUSANDS/UL (ref 149–390)
PLATELET BLD QL SMEAR: ADEQUATE
PMV BLD AUTO: 9.3 FL (ref 8.9–12.7)
POTASSIUM SERPL-SCNC: 3.5 MMOL/L (ref 3.5–5.3)
PROT SERPL-MCNC: 7.5 G/DL (ref 6.4–8.2)
PROTHROMBIN TIME: 11.6 SECONDS (ref 11.8–14.2)
RBC # BLD AUTO: 4.67 MILLION/UL (ref 3.81–5.12)
RBC MORPH BLD: NORMAL
SODIUM SERPL-SCNC: 142 MMOL/L (ref 136–145)
TOTAL CELLS COUNTED SPEC: 100
TROPONIN I SERPL-MCNC: <0.02 NG/ML
TROPONIN I SERPL-MCNC: <0.02 NG/ML
VARIANT LYMPHS # BLD AUTO: 1 %
WBC # BLD AUTO: 13.21 THOUSAND/UL (ref 4.31–10.16)

## 2019-05-24 PROCEDURE — 82948 REAGENT STRIP/BLOOD GLUCOSE: CPT

## 2019-05-24 PROCEDURE — 83880 ASSAY OF NATRIURETIC PEPTIDE: CPT | Performed by: EMERGENCY MEDICINE

## 2019-05-24 PROCEDURE — 85027 COMPLETE CBC AUTOMATED: CPT | Performed by: EMERGENCY MEDICINE

## 2019-05-24 PROCEDURE — 83605 ASSAY OF LACTIC ACID: CPT | Performed by: EMERGENCY MEDICINE

## 2019-05-24 PROCEDURE — 93005 ELECTROCARDIOGRAM TRACING: CPT

## 2019-05-24 PROCEDURE — 84484 ASSAY OF TROPONIN QUANT: CPT | Performed by: PHYSICIAN ASSISTANT

## 2019-05-24 PROCEDURE — 85730 THROMBOPLASTIN TIME PARTIAL: CPT | Performed by: EMERGENCY MEDICINE

## 2019-05-24 PROCEDURE — 99285 EMERGENCY DEPT VISIT HI MDM: CPT | Performed by: EMERGENCY MEDICINE

## 2019-05-24 PROCEDURE — 71045 X-RAY EXAM CHEST 1 VIEW: CPT

## 2019-05-24 PROCEDURE — 84484 ASSAY OF TROPONIN QUANT: CPT | Performed by: EMERGENCY MEDICINE

## 2019-05-24 PROCEDURE — 83735 ASSAY OF MAGNESIUM: CPT | Performed by: EMERGENCY MEDICINE

## 2019-05-24 PROCEDURE — 99285 EMERGENCY DEPT VISIT HI MDM: CPT

## 2019-05-24 PROCEDURE — 85610 PROTHROMBIN TIME: CPT | Performed by: EMERGENCY MEDICINE

## 2019-05-24 PROCEDURE — 75635 CT ANGIO ABDOMINAL ARTERIES: CPT

## 2019-05-24 PROCEDURE — 71275 CT ANGIOGRAPHY CHEST: CPT

## 2019-05-24 PROCEDURE — 85007 BL SMEAR W/DIFF WBC COUNT: CPT | Performed by: EMERGENCY MEDICINE

## 2019-05-24 PROCEDURE — 80053 COMPREHEN METABOLIC PANEL: CPT | Performed by: EMERGENCY MEDICINE

## 2019-05-24 PROCEDURE — 36415 COLL VENOUS BLD VENIPUNCTURE: CPT | Performed by: EMERGENCY MEDICINE

## 2019-05-24 PROCEDURE — 99219 PR INITIAL OBSERVATION CARE/DAY 50 MINUTES: CPT | Performed by: PHYSICIAN ASSISTANT

## 2019-05-24 RX ORDER — NITROGLYCERIN 0.4 MG/1
0.4 TABLET SUBLINGUAL
Status: DISCONTINUED | OUTPATIENT
Start: 2019-05-24 | End: 2019-05-25 | Stop reason: HOSPADM

## 2019-05-24 RX ORDER — FLUTICASONE PROPIONATE 50 MCG
2 SPRAY, SUSPENSION (ML) NASAL DAILY
Status: DISCONTINUED | OUTPATIENT
Start: 2019-05-25 | End: 2019-05-25 | Stop reason: HOSPADM

## 2019-05-24 RX ORDER — ASPIRIN 81 MG/1
324 TABLET, CHEWABLE ORAL ONCE
Status: COMPLETED | OUTPATIENT
Start: 2019-05-24 | End: 2019-05-24

## 2019-05-24 RX ORDER — ONDANSETRON 4 MG/1
4 TABLET, ORALLY DISINTEGRATING ORAL EVERY 6 HOURS PRN
Status: DISCONTINUED | OUTPATIENT
Start: 2019-05-24 | End: 2019-05-25 | Stop reason: HOSPADM

## 2019-05-24 RX ORDER — PRAVASTATIN SODIUM 40 MG
40 TABLET ORAL
Status: DISCONTINUED | OUTPATIENT
Start: 2019-05-25 | End: 2019-05-25 | Stop reason: HOSPADM

## 2019-05-24 RX ORDER — ONDANSETRON 2 MG/ML
4 INJECTION INTRAMUSCULAR; INTRAVENOUS EVERY 6 HOURS PRN
Status: DISCONTINUED | OUTPATIENT
Start: 2019-05-24 | End: 2019-05-25 | Stop reason: HOSPADM

## 2019-05-24 RX ORDER — HYDROXYUREA 500 MG/1
500 CAPSULE ORAL EVERY 24 HOURS
Status: DISCONTINUED | OUTPATIENT
Start: 2019-05-24 | End: 2019-05-25 | Stop reason: HOSPADM

## 2019-05-24 RX ORDER — NICOTINE 21 MG/24HR
1 PATCH, TRANSDERMAL 24 HOURS TRANSDERMAL DAILY
Status: DISCONTINUED | OUTPATIENT
Start: 2019-05-25 | End: 2019-05-25 | Stop reason: HOSPADM

## 2019-05-24 RX ORDER — ASPIRIN 81 MG/1
81 TABLET ORAL DAILY
Status: DISCONTINUED | OUTPATIENT
Start: 2019-05-25 | End: 2019-05-25 | Stop reason: HOSPADM

## 2019-05-24 RX ORDER — MELOXICAM 7.5 MG/1
7.5 TABLET ORAL DAILY
Status: DISCONTINUED | OUTPATIENT
Start: 2019-05-25 | End: 2019-05-25 | Stop reason: HOSPADM

## 2019-05-24 RX ORDER — LOSARTAN POTASSIUM 50 MG/1
25 TABLET ORAL DAILY
Status: DISCONTINUED | OUTPATIENT
Start: 2019-05-25 | End: 2019-05-25 | Stop reason: HOSPADM

## 2019-05-24 RX ADMIN — IOHEXOL 80 ML: 350 INJECTION, SOLUTION INTRAVENOUS at 19:55

## 2019-05-24 RX ADMIN — NITROGLYCERIN 0.4 MG: 0.4 TABLET SUBLINGUAL at 19:31

## 2019-05-24 RX ADMIN — ASPIRIN 81 MG 324 MG: 81 TABLET ORAL at 19:30

## 2019-05-24 RX ADMIN — IOHEXOL 80 ML: 350 INJECTION, SOLUTION INTRAVENOUS at 20:00

## 2019-05-25 VITALS
SYSTOLIC BLOOD PRESSURE: 111 MMHG | HEART RATE: 90 BPM | OXYGEN SATURATION: 96 % | HEIGHT: 63 IN | TEMPERATURE: 97.5 F | BODY MASS INDEX: 29.1 KG/M2 | DIASTOLIC BLOOD PRESSURE: 56 MMHG | WEIGHT: 164.24 LBS | RESPIRATION RATE: 19 BRPM

## 2019-05-25 LAB
ALBUMIN SERPL BCP-MCNC: 2.8 G/DL (ref 3.5–5)
ALP SERPL-CCNC: 88 U/L (ref 46–116)
ALT SERPL W P-5'-P-CCNC: 17 U/L (ref 12–78)
ANION GAP SERPL CALCULATED.3IONS-SCNC: 7 MMOL/L (ref 4–13)
AST SERPL W P-5'-P-CCNC: 11 U/L (ref 5–45)
ATRIAL RATE: 76 BPM
ATRIAL RATE: 79 BPM
ATRIAL RATE: 80 BPM
BILIRUB SERPL-MCNC: 0.4 MG/DL (ref 0.2–1)
BUN SERPL-MCNC: 18 MG/DL (ref 5–25)
CALCIUM SERPL-MCNC: 8.7 MG/DL (ref 8.3–10.1)
CHLORIDE SERPL-SCNC: 106 MMOL/L (ref 100–108)
CO2 SERPL-SCNC: 29 MMOL/L (ref 21–32)
CREAT SERPL-MCNC: 0.86 MG/DL (ref 0.6–1.3)
ERYTHROCYTE [DISTWIDTH] IN BLOOD BY AUTOMATED COUNT: 14.7 % (ref 11.6–15.1)
GFR SERPL CREATININE-BSD FRML MDRD: 77 ML/MIN/1.73SQ M
GLUCOSE SERPL-MCNC: 146 MG/DL (ref 65–140)
GLUCOSE SERPL-MCNC: 177 MG/DL (ref 65–140)
GLUCOSE SERPL-MCNC: 184 MG/DL (ref 65–140)
HCT VFR BLD AUTO: 43.2 % (ref 34.8–46.1)
HGB BLD-MCNC: 13.9 G/DL (ref 11.5–15.4)
MAGNESIUM SERPL-MCNC: 1.9 MG/DL (ref 1.6–2.6)
MCH RBC QN AUTO: 33.5 PG (ref 26.8–34.3)
MCHC RBC AUTO-ENTMCNC: 32.2 G/DL (ref 31.4–37.4)
MCV RBC AUTO: 104 FL (ref 82–98)
P AXIS: 52 DEGREES
P AXIS: 56 DEGREES
P AXIS: 58 DEGREES
PHOSPHATE SERPL-MCNC: 4.9 MG/DL (ref 2.7–4.5)
PLATELET # BLD AUTO: 294 THOUSANDS/UL (ref 149–390)
PLATELET # BLD AUTO: 301 THOUSANDS/UL (ref 149–390)
PMV BLD AUTO: 9.5 FL (ref 8.9–12.7)
PMV BLD AUTO: 9.6 FL (ref 8.9–12.7)
POTASSIUM SERPL-SCNC: 3.8 MMOL/L (ref 3.5–5.3)
PR INTERVAL: 152 MS
PR INTERVAL: 158 MS
PR INTERVAL: 160 MS
PROT SERPL-MCNC: 6.7 G/DL (ref 6.4–8.2)
QRS AXIS: 34 DEGREES
QRS AXIS: 37 DEGREES
QRS AXIS: 59 DEGREES
QRSD INTERVAL: 86 MS
QRSD INTERVAL: 88 MS
QRSD INTERVAL: 92 MS
QT INTERVAL: 388 MS
QT INTERVAL: 398 MS
QT INTERVAL: 400 MS
QTC INTERVAL: 447 MS
QTC INTERVAL: 447 MS
QTC INTERVAL: 458 MS
RBC # BLD AUTO: 4.15 MILLION/UL (ref 3.81–5.12)
SODIUM SERPL-SCNC: 142 MMOL/L (ref 136–145)
T WAVE AXIS: 32 DEGREES
T WAVE AXIS: 33 DEGREES
T WAVE AXIS: 44 DEGREES
TROPONIN I SERPL-MCNC: <0.02 NG/ML
VENTRICULAR RATE: 76 BPM
VENTRICULAR RATE: 79 BPM
VENTRICULAR RATE: 80 BPM
WBC # BLD AUTO: 11.51 THOUSAND/UL (ref 4.31–10.16)

## 2019-05-25 PROCEDURE — 93010 ELECTROCARDIOGRAM REPORT: CPT | Performed by: INTERNAL MEDICINE

## 2019-05-25 PROCEDURE — 85049 AUTOMATED PLATELET COUNT: CPT | Performed by: PHYSICIAN ASSISTANT

## 2019-05-25 PROCEDURE — 93005 ELECTROCARDIOGRAM TRACING: CPT

## 2019-05-25 PROCEDURE — 82948 REAGENT STRIP/BLOOD GLUCOSE: CPT

## 2019-05-25 PROCEDURE — 84100 ASSAY OF PHOSPHORUS: CPT | Performed by: PHYSICIAN ASSISTANT

## 2019-05-25 PROCEDURE — 83735 ASSAY OF MAGNESIUM: CPT | Performed by: PHYSICIAN ASSISTANT

## 2019-05-25 PROCEDURE — 85027 COMPLETE CBC AUTOMATED: CPT | Performed by: PHYSICIAN ASSISTANT

## 2019-05-25 PROCEDURE — 84484 ASSAY OF TROPONIN QUANT: CPT | Performed by: PHYSICIAN ASSISTANT

## 2019-05-25 PROCEDURE — 80053 COMPREHEN METABOLIC PANEL: CPT | Performed by: PHYSICIAN ASSISTANT

## 2019-05-25 PROCEDURE — 99217 PR OBSERVATION CARE DISCHARGE MANAGEMENT: CPT | Performed by: PHYSICIAN ASSISTANT

## 2019-05-25 RX ADMIN — INSULIN LISPRO 1 UNITS: 100 INJECTION, SOLUTION INTRAVENOUS; SUBCUTANEOUS at 11:39

## 2019-05-25 RX ADMIN — LOSARTAN POTASSIUM 25 MG: 50 TABLET, FILM COATED ORAL at 08:22

## 2019-05-25 RX ADMIN — ENOXAPARIN SODIUM 40 MG: 40 INJECTION SUBCUTANEOUS at 08:22

## 2019-05-25 RX ADMIN — ASPIRIN 81 MG: 81 TABLET, COATED ORAL at 08:22

## 2019-05-28 LAB
ATRIAL RATE: 92 BPM
P AXIS: 70 DEGREES
PR INTERVAL: 146 MS
QRS AXIS: 69 DEGREES
QRSD INTERVAL: 88 MS
QT INTERVAL: 368 MS
QTC INTERVAL: 455 MS
T WAVE AXIS: 58 DEGREES
VENTRICULAR RATE: 92 BPM

## 2019-05-28 PROCEDURE — 93010 ELECTROCARDIOGRAM REPORT: CPT | Performed by: INTERNAL MEDICINE

## 2019-05-29 ENCOUNTER — TRANSITIONAL CARE MANAGEMENT (OUTPATIENT)
Dept: FAMILY MEDICINE CLINIC | Facility: CLINIC | Age: 54
End: 2019-05-29

## 2019-06-06 ENCOUNTER — OFFICE VISIT (OUTPATIENT)
Dept: VASCULAR SURGERY | Facility: CLINIC | Age: 54
End: 2019-06-06
Payer: COMMERCIAL

## 2019-06-06 VITALS
SYSTOLIC BLOOD PRESSURE: 104 MMHG | HEIGHT: 63 IN | TEMPERATURE: 97.2 F | BODY MASS INDEX: 28.17 KG/M2 | DIASTOLIC BLOOD PRESSURE: 72 MMHG | WEIGHT: 159 LBS | HEART RATE: 91 BPM

## 2019-06-06 DIAGNOSIS — I74.09 AORTOILIAC OCCLUSIVE DISEASE (HCC): Primary | ICD-10-CM

## 2019-06-06 PROCEDURE — 1111F DSCHRG MED/CURRENT MED MERGE: CPT | Performed by: SURGERY

## 2019-06-06 PROCEDURE — 99214 OFFICE O/P EST MOD 30 MIN: CPT | Performed by: SURGERY

## 2019-06-07 ENCOUNTER — TELEPHONE (OUTPATIENT)
Dept: VASCULAR SURGERY | Facility: CLINIC | Age: 54
End: 2019-06-07

## 2019-06-11 ENCOUNTER — OFFICE VISIT (OUTPATIENT)
Dept: FAMILY MEDICINE CLINIC | Facility: CLINIC | Age: 54
End: 2019-06-11
Payer: COMMERCIAL

## 2019-06-11 ENCOUNTER — TELEPHONE (OUTPATIENT)
Dept: ADMINISTRATIVE | Facility: HOSPITAL | Age: 54
End: 2019-06-11

## 2019-06-11 ENCOUNTER — APPOINTMENT (OUTPATIENT)
Dept: LAB | Facility: MEDICAL CENTER | Age: 54
End: 2019-06-11
Payer: COMMERCIAL

## 2019-06-11 VITALS
WEIGHT: 160.2 LBS | HEART RATE: 102 BPM | HEIGHT: 63 IN | SYSTOLIC BLOOD PRESSURE: 126 MMHG | TEMPERATURE: 97.1 F | DIASTOLIC BLOOD PRESSURE: 70 MMHG | BODY MASS INDEX: 28.39 KG/M2 | OXYGEN SATURATION: 96 %

## 2019-06-11 DIAGNOSIS — J32.9 SINUSITIS, UNSPECIFIED CHRONICITY, UNSPECIFIED LOCATION: Primary | ICD-10-CM

## 2019-06-11 DIAGNOSIS — I74.09 AORTOILIAC OCCLUSIVE DISEASE (HCC): ICD-10-CM

## 2019-06-11 LAB
ANION GAP SERPL CALCULATED.3IONS-SCNC: 5 MMOL/L (ref 4–13)
BASOPHILS # BLD AUTO: 0.12 THOUSANDS/ΜL (ref 0–0.1)
BASOPHILS NFR BLD AUTO: 1 % (ref 0–1)
BUN SERPL-MCNC: 14 MG/DL (ref 5–25)
CALCIUM SERPL-MCNC: 9.6 MG/DL (ref 8.3–10.1)
CHLORIDE SERPL-SCNC: 105 MMOL/L (ref 100–108)
CO2 SERPL-SCNC: 28 MMOL/L (ref 21–32)
CREAT SERPL-MCNC: 0.79 MG/DL (ref 0.6–1.3)
EOSINOPHIL # BLD AUTO: 0.24 THOUSAND/ΜL (ref 0–0.61)
EOSINOPHIL NFR BLD AUTO: 2 % (ref 0–6)
ERYTHROCYTE [DISTWIDTH] IN BLOOD BY AUTOMATED COUNT: 15 % (ref 11.6–15.1)
GFR SERPL CREATININE-BSD FRML MDRD: 86 ML/MIN/1.73SQ M
GLUCOSE SERPL-MCNC: 177 MG/DL (ref 65–140)
HCT VFR BLD AUTO: 48.4 % (ref 34.8–46.1)
HGB BLD-MCNC: 15.6 G/DL (ref 11.5–15.4)
IMM GRANULOCYTES # BLD AUTO: 0.08 THOUSAND/UL (ref 0–0.2)
IMM GRANULOCYTES NFR BLD AUTO: 1 % (ref 0–2)
LYMPHOCYTES # BLD AUTO: 3.46 THOUSANDS/ΜL (ref 0.6–4.47)
LYMPHOCYTES NFR BLD AUTO: 25 % (ref 14–44)
MCH RBC QN AUTO: 33.6 PG (ref 26.8–34.3)
MCHC RBC AUTO-ENTMCNC: 32.2 G/DL (ref 31.4–37.4)
MCV RBC AUTO: 104 FL (ref 82–98)
MONOCYTES # BLD AUTO: 0.75 THOUSAND/ΜL (ref 0.17–1.22)
MONOCYTES NFR BLD AUTO: 5 % (ref 4–12)
NEUTROPHILS # BLD AUTO: 9.14 THOUSANDS/ΜL (ref 1.85–7.62)
NEUTS SEG NFR BLD AUTO: 66 % (ref 43–75)
NRBC BLD AUTO-RTO: 0 /100 WBCS
PLATELET # BLD AUTO: 327 THOUSANDS/UL (ref 149–390)
PMV BLD AUTO: 9.8 FL (ref 8.9–12.7)
POTASSIUM SERPL-SCNC: 4.3 MMOL/L (ref 3.5–5.3)
RBC # BLD AUTO: 4.64 MILLION/UL (ref 3.81–5.12)
SODIUM SERPL-SCNC: 138 MMOL/L (ref 136–145)
WBC # BLD AUTO: 13.79 THOUSAND/UL (ref 4.31–10.16)

## 2019-06-11 PROCEDURE — 36415 COLL VENOUS BLD VENIPUNCTURE: CPT | Performed by: INTERNAL MEDICINE

## 2019-06-11 PROCEDURE — 99214 OFFICE O/P EST MOD 30 MIN: CPT | Performed by: PHYSICIAN ASSISTANT

## 2019-06-11 PROCEDURE — 85025 COMPLETE CBC W/AUTO DIFF WBC: CPT | Performed by: INTERNAL MEDICINE

## 2019-06-11 PROCEDURE — 80048 BASIC METABOLIC PNL TOTAL CA: CPT

## 2019-06-11 RX ORDER — AMOXICILLIN 500 MG/1
500 CAPSULE ORAL EVERY 8 HOURS SCHEDULED
Qty: 30 CAPSULE | Refills: 0 | Status: SHIPPED | OUTPATIENT
Start: 2019-06-11 | End: 2019-06-21

## 2019-06-13 ENCOUNTER — ANESTHESIA EVENT (OUTPATIENT)
Dept: PERIOP | Facility: HOSPITAL | Age: 54
End: 2019-06-13
Payer: COMMERCIAL

## 2019-06-14 ENCOUNTER — HOSPITAL ENCOUNTER (OUTPATIENT)
Dept: RADIOLOGY | Facility: HOSPITAL | Age: 54
Discharge: HOME | End: 2019-06-14
Attending: SURGERY
Payer: COMMERCIAL

## 2019-06-14 ENCOUNTER — HOSPITAL ENCOUNTER (OUTPATIENT)
Facility: HOSPITAL | Age: 54
Setting detail: OUTPATIENT SURGERY
Discharge: HOME | End: 2019-06-14
Attending: SURGERY | Admitting: SURGERY
Payer: COMMERCIAL

## 2019-06-14 ENCOUNTER — ANESTHESIA (OUTPATIENT)
Dept: PERIOP | Facility: HOSPITAL | Age: 54
End: 2019-06-14
Payer: COMMERCIAL

## 2019-06-14 VITALS
DIASTOLIC BLOOD PRESSURE: 58 MMHG | SYSTOLIC BLOOD PRESSURE: 132 MMHG | RESPIRATION RATE: 18 BRPM | OXYGEN SATURATION: 98 % | TEMPERATURE: 96.4 F | HEART RATE: 77 BPM | HEIGHT: 63 IN | WEIGHT: 160 LBS | BODY MASS INDEX: 28.35 KG/M2

## 2019-06-14 DIAGNOSIS — I70.229 ATHEROSCLEROSIS OF ARTERY OF EXTREMITY WITH REST PAIN (HCC): Primary | ICD-10-CM

## 2019-06-14 DIAGNOSIS — I74.09 AORTOILIAC OCCLUSIVE DISEASE (HCC): ICD-10-CM

## 2019-06-14 LAB
GLUCOSE SERPL-MCNC: 127 MG/DL (ref 65–140)
GLUCOSE SERPL-MCNC: 159 MG/DL (ref 65–140)

## 2019-06-14 PROCEDURE — C1725 CATH, TRANSLUMIN NON-LASER: HCPCS | Performed by: SURGERY

## 2019-06-14 PROCEDURE — C1769 GUIDE WIRE: HCPCS | Performed by: SURGERY

## 2019-06-14 PROCEDURE — 36140 INTRO NDL ICATH UPR/LXTR ART: CPT | Performed by: SURGERY

## 2019-06-14 PROCEDURE — C1894 INTRO/SHEATH, NON-LASER: HCPCS | Performed by: SURGERY

## 2019-06-14 PROCEDURE — C1887 CATHETER, GUIDING: HCPCS | Performed by: SURGERY

## 2019-06-14 PROCEDURE — C1876 STENT, NON-COA/NON-COV W/DEL: HCPCS | Performed by: SURGERY

## 2019-06-14 PROCEDURE — NC001 PR NO CHARGE: Performed by: SURGERY

## 2019-06-14 PROCEDURE — 82948 REAGENT STRIP/BLOOD GLUCOSE: CPT

## 2019-06-14 PROCEDURE — 75716 ARTERY X-RAYS ARMS/LEGS: CPT

## 2019-06-14 PROCEDURE — 76937 US GUIDE VASCULAR ACCESS: CPT

## 2019-06-14 PROCEDURE — 75625 CONTRAST EXAM ABDOMINL AORTA: CPT

## 2019-06-14 PROCEDURE — C1760 CLOSURE DEV, VASC: HCPCS | Performed by: SURGERY

## 2019-06-14 PROCEDURE — 37221 PR REVASCULARIZE ILIAC ARTERY,ANGIOPLASTY/STENT, INITIAL VESSEL: CPT | Performed by: SURGERY

## 2019-06-14 DEVICE — IMPLANTABLE DEVICE: Type: IMPLANTABLE DEVICE | Site: ARTERIAL | Status: FUNCTIONAL

## 2019-06-14 DEVICE — DEVICE CLOSURE MYNX CONTROL 6F/7FR: Type: IMPLANTABLE DEVICE | Site: ARTERIAL | Status: FUNCTIONAL

## 2019-06-14 RX ORDER — GLYCOPYRROLATE 0.2 MG/ML
INJECTION INTRAMUSCULAR; INTRAVENOUS AS NEEDED
Status: DISCONTINUED | OUTPATIENT
Start: 2019-06-14 | End: 2019-06-14 | Stop reason: SURG

## 2019-06-14 RX ORDER — LIDOCAINE HYDROCHLORIDE 10 MG/ML
0.5 INJECTION, SOLUTION EPIDURAL; INFILTRATION; INTRACAUDAL; PERINEURAL ONCE AS NEEDED
Status: COMPLETED | OUTPATIENT
Start: 2019-06-14 | End: 2019-06-14

## 2019-06-14 RX ORDER — ACETAMINOPHEN 325 MG/1
650 TABLET ORAL EVERY 4 HOURS PRN
Status: DISCONTINUED | OUTPATIENT
Start: 2019-06-14 | End: 2019-06-14 | Stop reason: HOSPADM

## 2019-06-14 RX ORDER — ONDANSETRON 2 MG/ML
4 INJECTION INTRAMUSCULAR; INTRAVENOUS ONCE AS NEEDED
Status: DISCONTINUED | OUTPATIENT
Start: 2019-06-14 | End: 2019-06-14 | Stop reason: HOSPADM

## 2019-06-14 RX ORDER — FENTANYL CITRATE/PF 50 MCG/ML
12.5 SYRINGE (ML) INJECTION
Status: DISCONTINUED | OUTPATIENT
Start: 2019-06-14 | End: 2019-06-14 | Stop reason: HOSPADM

## 2019-06-14 RX ORDER — SODIUM CHLORIDE 9 MG/ML
125 INJECTION, SOLUTION INTRAVENOUS CONTINUOUS
Status: DISPENSED | OUTPATIENT
Start: 2019-06-14 | End: 2019-06-14

## 2019-06-14 RX ORDER — PROTAMINE SULFATE 10 MG/ML
INJECTION, SOLUTION INTRAVENOUS AS NEEDED
Status: DISCONTINUED | OUTPATIENT
Start: 2019-06-14 | End: 2019-06-14 | Stop reason: SURG

## 2019-06-14 RX ORDER — DEXAMETHASONE SODIUM PHOSPHATE 4 MG/ML
8 INJECTION, SOLUTION INTRA-ARTICULAR; INTRALESIONAL; INTRAMUSCULAR; INTRAVENOUS; SOFT TISSUE ONCE AS NEEDED
Status: DISCONTINUED | OUTPATIENT
Start: 2019-06-14 | End: 2019-06-14 | Stop reason: HOSPADM

## 2019-06-14 RX ORDER — SODIUM CHLORIDE, SODIUM LACTATE, POTASSIUM CHLORIDE, CALCIUM CHLORIDE 600; 310; 30; 20 MG/100ML; MG/100ML; MG/100ML; MG/100ML
125 INJECTION, SOLUTION INTRAVENOUS CONTINUOUS
Status: DISCONTINUED | OUTPATIENT
Start: 2019-06-14 | End: 2019-06-14 | Stop reason: HOSPADM

## 2019-06-14 RX ORDER — HYDROMORPHONE HCL/PF 1 MG/ML
0.2 SYRINGE (ML) INJECTION
Status: DISCONTINUED | OUTPATIENT
Start: 2019-06-14 | End: 2019-06-14 | Stop reason: HOSPADM

## 2019-06-14 RX ORDER — MIDAZOLAM HYDROCHLORIDE 1 MG/ML
INJECTION INTRAMUSCULAR; INTRAVENOUS AS NEEDED
Status: DISCONTINUED | OUTPATIENT
Start: 2019-06-14 | End: 2019-06-14 | Stop reason: SURG

## 2019-06-14 RX ORDER — HEPARIN SODIUM 1000 [USP'U]/ML
INJECTION, SOLUTION INTRAVENOUS; SUBCUTANEOUS AS NEEDED
Status: DISCONTINUED | OUTPATIENT
Start: 2019-06-14 | End: 2019-06-14 | Stop reason: SURG

## 2019-06-14 RX ORDER — EPHEDRINE SULFATE 50 MG/ML
INJECTION INTRAVENOUS AS NEEDED
Status: DISCONTINUED | OUTPATIENT
Start: 2019-06-14 | End: 2019-06-14 | Stop reason: SURG

## 2019-06-14 RX ORDER — CLOPIDOGREL BISULFATE 75 MG/1
75 TABLET ORAL DAILY
Qty: 90 TABLET | Refills: 0 | Status: SHIPPED | OUTPATIENT
Start: 2019-06-15 | End: 2019-10-11 | Stop reason: CLARIF

## 2019-06-14 RX ORDER — SODIUM CHLORIDE, SODIUM LACTATE, POTASSIUM CHLORIDE, CALCIUM CHLORIDE 600; 310; 30; 20 MG/100ML; MG/100ML; MG/100ML; MG/100ML
INJECTION, SOLUTION INTRAVENOUS CONTINUOUS PRN
Status: DISCONTINUED | OUTPATIENT
Start: 2019-06-14 | End: 2019-06-14 | Stop reason: SURG

## 2019-06-14 RX ORDER — CLOPIDOGREL BISULFATE 75 MG/1
300 TABLET ORAL ONCE
Status: COMPLETED | OUTPATIENT
Start: 2019-06-14 | End: 2019-06-14

## 2019-06-14 RX ADMIN — CLOPIDOGREL BISULFATE 300 MG: 75 TABLET ORAL at 12:42

## 2019-06-14 RX ADMIN — SODIUM CHLORIDE 125 ML/HR: 0.9 INJECTION, SOLUTION INTRAVENOUS at 13:02

## 2019-06-14 RX ADMIN — PROTAMINE SULFATE 15 MG: 10 INJECTION, SOLUTION INTRAVENOUS at 12:11

## 2019-06-14 RX ADMIN — GLYCOPYRROLATE 0.2 MG: 0.2 INJECTION, SOLUTION INTRAMUSCULAR; INTRAVENOUS at 10:41

## 2019-06-14 RX ADMIN — EPHEDRINE SULFATE 10 MG: 50 INJECTION, SOLUTION INTRAVENOUS at 11:04

## 2019-06-14 RX ADMIN — SODIUM CHLORIDE, SODIUM LACTATE, POTASSIUM CHLORIDE, AND CALCIUM CHLORIDE: .6; .31; .03; .02 INJECTION, SOLUTION INTRAVENOUS at 10:39

## 2019-06-14 RX ADMIN — PHENYLEPHRINE HYDROCHLORIDE 200 MCG: 10 INJECTION INTRAVENOUS at 11:23

## 2019-06-14 RX ADMIN — DEXMEDETOMIDINE HYDROCHLORIDE 0.4 MCG/KG/HR: 100 INJECTION, SOLUTION INTRAVENOUS at 10:53

## 2019-06-14 RX ADMIN — MIDAZOLAM 2 MG: 1 INJECTION INTRAMUSCULAR; INTRAVENOUS at 10:40

## 2019-06-14 RX ADMIN — SODIUM CHLORIDE, SODIUM LACTATE, POTASSIUM CHLORIDE, AND CALCIUM CHLORIDE 125 ML/HR: .6; .31; .03; .02 INJECTION, SOLUTION INTRAVENOUS at 10:40

## 2019-06-14 RX ADMIN — HEPARIN SODIUM 6000 UNITS: 1000 INJECTION INTRAVENOUS; SUBCUTANEOUS at 11:19

## 2019-06-28 ENCOUNTER — TELEPHONE (OUTPATIENT)
Dept: FAMILY MEDICINE CLINIC | Facility: CLINIC | Age: 54
End: 2019-06-28

## 2019-07-03 ENCOUNTER — OFFICE VISIT (OUTPATIENT)
Dept: VASCULAR SURGERY | Facility: CLINIC | Age: 54
End: 2019-07-03
Payer: COMMERCIAL

## 2019-07-03 VITALS
TEMPERATURE: 97.6 F | HEART RATE: 100 BPM | SYSTOLIC BLOOD PRESSURE: 120 MMHG | BODY MASS INDEX: 28.35 KG/M2 | WEIGHT: 160 LBS | DIASTOLIC BLOOD PRESSURE: 70 MMHG | HEIGHT: 63 IN

## 2019-07-03 DIAGNOSIS — I70.229 ATHEROSCLEROSIS OF ARTERY OF EXTREMITY WITH REST PAIN (HCC): Primary | ICD-10-CM

## 2019-07-03 PROCEDURE — 99213 OFFICE O/P EST LOW 20 MIN: CPT | Performed by: SURGERY

## 2019-07-03 NOTE — PROGRESS NOTES
Assessment/Plan:    Atherosclerosis of artery of extremity with rest pain ([address] )  Bilateral common iliac artery angioplasty  Angioplasty of left common iliac artery severe stenosis resulting in resolution of left calf claudication  Patient instructed on the importance of continuing walking exercise program   Patient instructed on boards of smoking cessation  Patient will complete 3 month course of Plavix  Patient will remain on aspirin and Plavix  Patient will undergo 3 month surveillance duplex  Diagnoses and all orders for this visit:    Atherosclerosis of artery of extremity with rest pain (HCC)  -     VAS lower limb arterial duplex, complete bilateral; Future          Subjective:      Patient ID: Gina Segura is a 48 y o  female  Pt is s/p A-gram 6/14 of bilat LE with angioplasty R MAYRA and angioplasty/stenting L MAYRA  She feels slight improvement since the procedure  Pt is s/p A-gram 6/14 of bilat LE with angioplasty R MAYRA and angioplasty/stenting L MAYRA  Patient states the left calf cramping has completely resolved  She has other non specific complaints with regards to her hips and low back  I have explained to her these pain symptoms are not related to peripheral arterial occlusive disease  I have encouraged the patient to continue on walking exercise program   I have also encouraged the patient to continue with her smoking cessation program   She will come pre a 3 month course of Plavix then continue on aspirin and statin  The following portions of the patient's history were reviewed and updated as appropriate: allergies, current medications, past family history, past medical history, past social history, past surgical history and problem list     Review of Systems   Constitutional: Negative  HENT: Negative  Eyes: Negative  Respiratory: Positive for shortness of breath (with activity)  Cardiovascular: Negative  Gastrointestinal: Negative      Endocrine: Positive for cold intolerance and heat intolerance  Genitourinary: Negative  Musculoskeletal: Positive for arthralgias and gait problem  Leg pain and cramping with walking   Skin: Positive for color change  Allergic/Immunologic: Negative  Neurological: Positive for dizziness and headaches  Hematological: Bruises/bleeds easily  Psychiatric/Behavioral: Negative  Objective:      /70 (BP Location: Right arm, Patient Position: Sitting, Cuff Size: Adult)   Pulse 100   Temp 97 6 °F (36 4 °C) (Tympanic)   Ht 5' 3" (1 6 m)   Wt 72 6 kg (160 lb)   BMI 28 34 kg/m²          Physical Exam   Constitutional: She is oriented to person, place, and time  She appears well-developed and well-nourished  HENT:   Head: Normocephalic and atraumatic  Mouth/Throat: Oropharynx is clear and moist    Eyes: Pupils are equal, round, and reactive to light  Conjunctivae and EOM are normal    Neck: Normal range of motion  Neck supple  No JVD present  Cardiovascular: Normal rate, regular rhythm and normal heart sounds  Pulmonary/Chest: Effort normal and breath sounds normal  No stridor  No respiratory distress  She has no wheezes  She has no rales  She exhibits no tenderness  Abdominal: Soft  She exhibits no distension and no mass  There is no tenderness  There is no rebound and no guarding  Musculoskeletal: Normal range of motion  She exhibits no tenderness or deformity  Neurological: She is alert and oriented to person, place, and time  Skin: Skin is warm and dry  No rash noted  No erythema  Psychiatric: She has a normal mood and affect  Her behavior is normal  Thought content normal    Nursing note and vitals reviewed

## 2019-07-03 NOTE — ASSESSMENT & PLAN NOTE
Bilateral common iliac artery angioplasty  Angioplasty of left common iliac artery severe stenosis resulting in resolution of left calf claudication  Patient instructed on the importance of continuing walking exercise program   Patient instructed on boards of smoking cessation  Patient will complete 3 month course of Plavix  Patient will remain on aspirin and Plavix  Patient will undergo 3 month surveillance duplex

## 2019-07-03 NOTE — LETTER
July 3, 2019     Katelin Rabago,   [address] [facility]  [address]  [id number]    Patient: Kaity Rodriguez   YOB: 1965   Date of Visit: 7/3/2019       Dear Dr Lawrence García: Thank you for referring Joshua Goins to me for evaluation  Below are the relevant portions of my assessment and plan of care  Pt is s/p A-gram 6/14 of bilat LE with angioplasty R MAYRA and angioplasty/stenting L MAYRA  Patient states the left calf cramping has completely resolved  She has other non specific complaints with regards to her hips and low back  I have explained to her these pain symptoms are not related to peripheral arterial occlusive disease  I have encouraged the patient to continue on walking exercise program   I have also encouraged the patient to continue with her smoking cessation program   She will come pre a 3 month course of Plavix then continue on aspirin and statin  There are no diagnoses linked to this encounter  If you have questions, please do not hesitate to call me  I look forward to following Erin Murillo along with you           Sincerely,        Derik Dalton MD        CC: MD Jarad Kim PA-C Luna Lamp, MD

## 2019-07-15 ENCOUNTER — APPOINTMENT (OUTPATIENT)
Dept: LAB | Facility: MEDICAL CENTER | Age: 54
End: 2019-07-15
Payer: COMMERCIAL

## 2019-07-15 ENCOUNTER — TELEPHONE (OUTPATIENT)
Dept: FAMILY MEDICINE CLINIC | Facility: CLINIC | Age: 54
End: 2019-07-15

## 2019-07-15 DIAGNOSIS — R30.0 DYSURIA: Primary | ICD-10-CM

## 2019-07-15 LAB
BACTERIA UR QL AUTO: ABNORMAL /HPF
BILIRUB UR QL STRIP: NEGATIVE
CLARITY UR: CLEAR
COLOR UR: YELLOW
GLUCOSE UR STRIP-MCNC: ABNORMAL MG/DL
HGB UR QL STRIP.AUTO: NEGATIVE
HYALINE CASTS #/AREA URNS LPF: ABNORMAL /LPF
KETONES UR STRIP-MCNC: NEGATIVE MG/DL
LEUKOCYTE ESTERASE UR QL STRIP: ABNORMAL
NITRITE UR QL STRIP: NEGATIVE
NON-SQ EPI CELLS URNS QL MICRO: ABNORMAL /HPF
PH UR STRIP.AUTO: 6 [PH]
PROT UR STRIP-MCNC: NEGATIVE MG/DL
RBC #/AREA URNS AUTO: ABNORMAL /HPF
SP GR UR STRIP.AUTO: 1.04 (ref 1–1.03)
UROBILINOGEN UR QL STRIP.AUTO: 0.2 E.U./DL
WBC #/AREA URNS AUTO: ABNORMAL /HPF

## 2019-07-15 PROCEDURE — 81001 URINALYSIS AUTO W/SCOPE: CPT

## 2019-07-15 PROCEDURE — 87077 CULTURE AEROBIC IDENTIFY: CPT

## 2019-07-15 PROCEDURE — 87186 SC STD MICRODIL/AGAR DIL: CPT

## 2019-07-15 PROCEDURE — 87086 URINE CULTURE/COLONY COUNT: CPT

## 2019-07-17 DIAGNOSIS — R30.0 DYSURIA: Primary | ICD-10-CM

## 2019-07-17 LAB — BACTERIA UR CULT: ABNORMAL

## 2019-07-17 RX ORDER — LEVOFLOXACIN 500 MG/1
500 TABLET, FILM COATED ORAL EVERY 24 HOURS
Qty: 5 TABLET | Refills: 0 | Status: SHIPPED | OUTPATIENT
Start: 2019-07-17 | End: 2019-07-22

## 2019-08-02 ENCOUNTER — TELEPHONE (OUTPATIENT)
Dept: FAMILY MEDICINE CLINIC | Facility: CLINIC | Age: 54
End: 2019-08-02

## 2019-08-02 DIAGNOSIS — J32.9 SINUSITIS, UNSPECIFIED CHRONICITY, UNSPECIFIED LOCATION: Primary | ICD-10-CM

## 2019-08-02 RX ORDER — AMOXICILLIN AND CLAVULANATE POTASSIUM 875; 125 MG/1; MG/1
1 TABLET, FILM COATED ORAL EVERY 12 HOURS SCHEDULED
Qty: 14 TABLET | Refills: 0 | Status: SHIPPED | OUTPATIENT
Start: 2019-08-02 | End: 2019-08-09

## 2019-09-16 DIAGNOSIS — E11.65 TYPE 2 DIABETES MELLITUS WITH HYPERGLYCEMIA, WITHOUT LONG-TERM CURRENT USE OF INSULIN (HCC): ICD-10-CM

## 2019-09-16 LAB
LEFT EYE DIABETIC RETINOPATHY: NORMAL
RIGHT EYE DIABETIC RETINOPATHY: NORMAL

## 2019-09-17 DIAGNOSIS — E11.65 TYPE 2 DIABETES MELLITUS WITH HYPERGLYCEMIA, WITHOUT LONG-TERM CURRENT USE OF INSULIN (HCC): ICD-10-CM

## 2019-09-23 DIAGNOSIS — E11.65 TYPE 2 DIABETES MELLITUS WITH HYPERGLYCEMIA, WITHOUT LONG-TERM CURRENT USE OF INSULIN (HCC): ICD-10-CM

## 2019-09-23 RX ORDER — ERTUGLIFLOZIN 5 MG/1
TABLET, FILM COATED ORAL
Qty: 30 TABLET | Refills: 5 | Status: SHIPPED | OUTPATIENT
Start: 2019-09-23 | End: 2019-09-26 | Stop reason: SDUPTHER

## 2019-09-26 ENCOUNTER — OFFICE VISIT (OUTPATIENT)
Dept: FAMILY MEDICINE CLINIC | Facility: CLINIC | Age: 54
End: 2019-09-26
Payer: COMMERCIAL

## 2019-09-26 ENCOUNTER — APPOINTMENT (OUTPATIENT)
Dept: LAB | Facility: MEDICAL CENTER | Age: 54
End: 2019-09-26
Payer: COMMERCIAL

## 2019-09-26 VITALS
DIASTOLIC BLOOD PRESSURE: 66 MMHG | HEIGHT: 63 IN | SYSTOLIC BLOOD PRESSURE: 100 MMHG | WEIGHT: 156.4 LBS | BODY MASS INDEX: 27.71 KG/M2

## 2019-09-26 DIAGNOSIS — E11.65 TYPE 2 DIABETES MELLITUS WITH HYPERGLYCEMIA, WITHOUT LONG-TERM CURRENT USE OF INSULIN (HCC): Primary | ICD-10-CM

## 2019-09-26 DIAGNOSIS — J01.00 ACUTE MAXILLARY SINUSITIS, RECURRENCE NOT SPECIFIED: ICD-10-CM

## 2019-09-26 DIAGNOSIS — K29.00 ACUTE GASTRITIS WITHOUT HEMORRHAGE, UNSPECIFIED GASTRITIS TYPE: ICD-10-CM

## 2019-09-26 LAB — SL AMB POCT HEMOGLOBIN AIC: 7 (ref ?–6.5)

## 2019-09-26 PROCEDURE — 3008F BODY MASS INDEX DOCD: CPT | Performed by: PHYSICIAN ASSISTANT

## 2019-09-26 PROCEDURE — 99214 OFFICE O/P EST MOD 30 MIN: CPT | Performed by: PHYSICIAN ASSISTANT

## 2019-09-26 PROCEDURE — 83036 HEMOGLOBIN GLYCOSYLATED A1C: CPT | Performed by: PHYSICIAN ASSISTANT

## 2019-09-26 RX ORDER — AMOXICILLIN AND CLAVULANATE POTASSIUM 875; 125 MG/1; MG/1
1 TABLET, FILM COATED ORAL EVERY 12 HOURS SCHEDULED
Qty: 20 TABLET | Refills: 0 | Status: SHIPPED | OUTPATIENT
Start: 2019-09-26 | End: 2019-10-06

## 2019-09-26 RX ORDER — FAMOTIDINE 20 MG/1
20 TABLET, FILM COATED ORAL 2 TIMES DAILY
Qty: 60 TABLET | Refills: 2 | Status: SHIPPED | OUTPATIENT
Start: 2019-09-26 | End: 2019-12-20 | Stop reason: SDUPTHER

## 2019-09-26 NOTE — PROGRESS NOTES
Assessment/Plan:    Problem List Items Addressed This Visit        Endocrine    Type 2 diabetes mellitus with hyperglycemia, without long-term current use of insulin ([facility] ) - Primary    Relevant Orders    POCT hemoglobin A1c (Completed)      Other Visit Diagnoses     Acute maxillary sinusitis, recurrence not specified        Relevant Medications    amoxicillin-clavulanate (AUGMENTIN) 875-125 mg per tablet    Acute gastritis without hemorrhage, unspecified gastritis type        Relevant Medications    famotidine (PEPCID) 20 mg tablet           Diagnoses and all orders for this visit:    Type 2 diabetes mellitus with hyperglycemia, without long-term current use of insulin ([facility])  -     POCT hemoglobin A1c    Acute maxillary sinusitis, recurrence not specified  -     amoxicillin-clavulanate (AUGMENTIN) 875-125 mg per tablet; Take 1 tablet by mouth every 12 (twelve) hours for 10 days    Acute gastritis without hemorrhage, unspecified gastritis type  -     famotidine (PEPCID) 20 mg tablet; Take 1 tablet (20 mg total) by mouth 2 (two) times a day        POCT A1C was excellent at 7 0%  Encouraged her to continue the same medications and diet  Will try famotidine to see if helps with belching, bloating, and abdominal discomfort  She was advised to let us know if her symptoms do not improve or worsen and will refer to GI for possible EGD  Will treat sinusitis with Augmentin  She will follow-up with oncology next month  Subjective:      Patient ID: Jose Elena is a 47 y o  female  Kerrie Rios is a 47year old female who is complaining of sinus pressure and congestion, cough, and stuffy nose  She tried Flonase and zyrtec, but it did not improve her symptoms  She denies any fevers, chest pains, or shortness of breath  Secondly, she is complaining of abdominal discomfort, belching, sour taste in her mouth, and nausea for the past 3 days  She states that after eating she feels nauseous   Sometimes she gets a squeezing pain in the epigastric region of her abdomen  She states that she gets relief from belching  She tried pepto bismol, but it did not help  She has been on oral chemothearapy since September of 2018 for polycythema vera, and was tolerating it well  She states that she had a history of gastroparesis and a hiatal hernia  She is not following with GI  She denies any vomiting, black stools, or bloody stools  The following portions of the patient's history were reviewed and updated as appropriate:   She has a past medical history of Arthritis, Cancer ([facility] ), Diabetes mellitus ([facility] ), DVT, lower extremity ([facility] ), History of echocardiogram (11/19/2008), History of nuclear stress test (11/19/2008), Hyperlipidemia, Hypertension, and PONV (postoperative nausea and vomiting)  ,  does not have any pertinent problems on file  ,   has a past surgical history that includes Hysterectomy; Reduction mammaplasty; Appendectomy; Cholecystectomy; Bladder augmentation; [place] tooth extraction; Knee Arthroplasty; Knee arthroscopy (Bilateral); Hernia repair; Shoulder surgery; pr colonoscopy flx dx w/collj spec when pfrmd (N/A, 1/23/2018); Colonoscopy; pr slctv cathj 3rd+ ord slctv abdl pel/lxtr brnch (Left, 6/14/2019); and IR abdominal angiography / intervention (6/14/2019)  ,  family history includes Cancer in her mother; Diabetes in her father; Heart disease in her father  ,   reports that she has been smoking cigarettes  She has been smoking about 0 25 packs per day  She has never used smokeless tobacco  She reports that she drank alcohol  She reports that she does not use drugs  ,  is allergic to chantix [varenicline]; metformin; wellbutrin [bupropion]; clomid [clomiphene]; and latex     Current Outpatient Medications   Medication Sig Dispense Refill    aspirin (ECOTRIN LOW STRENGTH) 81 mg EC tablet Take 81 mg by mouth daily      Dulaglutide (TRULICITY) 2 69 ANNALISA/4 1JP SOPN Inject 0 5 mL (0 75 mg total) under the skin once a week 4 pen 3    Ertugliflozin L-PyroglutamicAc (STEGLATRO) 5 MG TABS Take 1 tablet by mouth daily 30 tablet 5    fluticasone (FLONASE) 50 mcg/act nasal spray 2 sprays into each nostril daily 1 Bottle 5    hydroxyurea (HYDREA) 500 mg capsule Take 1 capsule on odd numbered days and 2 capsules on even number days  45 capsule 5    losartan (COZAAR) 25 mg tablet Take 1 tablet (25 mg total) by mouth daily 90 tablet 3    meloxicam (MOBIC) 7 5 mg tablet Take 1 tablet (7 5 mg total) by mouth daily (Patient taking differently: Take 7 5 mg by mouth daily as needed ) 30 tablet 0    ondansetron (ZOFRAN-ODT) 4 mg disintegrating tablet Take 1 tablet (4 mg total) by mouth every 6 (six) hours as needed for nausea or vomiting 20 tablet 0    simvastatin (ZOCOR) 20 mg tablet Take 1 tablet (20 mg total) by mouth daily at bedtime 90 tablet 3    sitaGLIPtin (JANUVIA) 100 mg tablet Take 1 tablet (100 mg total) by mouth daily 90 tablet 3    amoxicillin-clavulanate (AUGMENTIN) 875-125 mg per tablet Take 1 tablet by mouth every 12 (twelve) hours for 10 days 20 tablet 0    clopidogrel (PLAVIX) 75 mg tablet Take 1 tablet (75 mg total) by mouth daily (Patient not taking: Reported on 9/26/2019) 90 tablet 0    famotidine (PEPCID) 20 mg tablet Take 1 tablet (20 mg total) by mouth 2 (two) times a day 60 tablet 2     No current facility-administered medications for this visit  Review of Systems   Constitutional: Positive for appetite change (decreased)  Negative for diaphoresis, fatigue and fever  HENT: Positive for congestion, sinus pressure and sinus pain  Negative for ear pain, postnasal drip, rhinorrhea, sneezing, sore throat and trouble swallowing  Eyes: Negative for pain and visual disturbance  Respiratory: Positive for cough  Negative for apnea, shortness of breath and wheezing  Cardiovascular: Negative for chest pain and palpitations  Gastrointestinal: Positive for abdominal pain and nausea   Negative for constipation, diarrhea and vomiting  Genitourinary: Negative for dysuria and hematuria  Musculoskeletal: Negative for arthralgias, gait problem and myalgias  Neurological: Negative for dizziness, syncope, weakness, light-headedness, numbness and headaches  Psychiatric/Behavioral: Negative for suicidal ideas  The patient is not nervous/anxious  Objective:  Vitals:    09/26/19 0940   BP: 100/66   BP Location: Left arm   Patient Position: Sitting   Weight: 70 9 kg (156 lb 6 4 oz)   Height: 5' 3" (1 6 m)     Body mass index is 27 71 kg/m²  Physical Exam   Constitutional: She is oriented to person, place, and time  She appears well-developed and well-nourished  HENT:   Head: Normocephalic and atraumatic  Right Ear: Tympanic membrane, external ear and ear canal normal    Left Ear: Tympanic membrane, external ear and ear canal normal    Nose: Nose normal    Mouth/Throat: Oropharynx is clear and moist and mucous membranes are normal  No oropharyngeal exudate, posterior oropharyngeal edema or posterior oropharyngeal erythema  Eyes: Pupils are equal, round, and reactive to light  EOM are normal    Neck: Normal range of motion  Neck supple  Cardiovascular: Normal rate, regular rhythm and normal heart sounds  Exam reveals no gallop and no friction rub  Pulses are weak pulses  No murmur heard  Pulses:       Dorsalis pedis pulses are 2+ on the right side, and 1+ on the left side  Posterior tibial pulses are 2+ on the right side, and 1+ on the left side  Pulmonary/Chest: Effort normal and breath sounds normal  No respiratory distress  She has no wheezes  She has no rales  Abdominal: Soft  Bowel sounds are normal  There is tenderness (epigastric)  There is no rebound and no guarding  Musculoskeletal: Normal range of motion  Feet:   Right Foot:   Skin Integrity: Positive for callus  Negative for ulcer, skin breakdown, erythema, warmth or dry skin  Left Foot:   Skin Integrity: Positive for callus   Negative for ulcer, skin breakdown, erythema, warmth or dry skin  Lymphadenopathy:     She has no cervical adenopathy  Neurological: She is alert and oriented to person, place, and time  Skin: Skin is warm and dry  Psychiatric: She has a normal mood and affect  Her behavior is normal  Judgment and thought content normal    Vitals reviewed  Patient's shoes and socks removed  Right Foot/Ankle   Right Foot Inspection  Skin Exam: skin normal, skin intact, callus and callus no dry skin, no warmth, no erythema, no maceration, no abnormal color, no pre-ulcer and no ulcer                          Toe Exam: ROM and strength within normal limitsno swelling and no tenderness  Sensory       Monofilament testing: intact  Vascular    The right DP pulse is 2+  The right PT pulse is 2+  Left Foot/Ankle  Left Foot Inspection  Skin Exam: skin normal, skin intact and callusno dry skin, no warmth, no erythema, no maceration, normal color, no pre-ulcer and no ulcer                         Toe Exam: ROM and strength within normal limitsno swelling and no tenderness                   Sensory       Monofilament: intact  Vascular    The left DP pulse is 1+  The left PT pulse is 1+  Assign Risk Category:  No deformity present;  No loss of protective sensation; Weak pulses       Risk: 0

## 2019-10-04 DIAGNOSIS — Z12.39 SCREENING FOR BREAST CANCER: Primary | ICD-10-CM

## 2019-10-11 ENCOUNTER — OFFICE VISIT (OUTPATIENT)
Dept: HEMATOLOGY ONCOLOGY | Facility: CLINIC | Age: 54
End: 2019-10-11
Payer: COMMERCIAL

## 2019-10-11 VITALS
BODY MASS INDEX: 27.89 KG/M2 | OXYGEN SATURATION: 98 % | DIASTOLIC BLOOD PRESSURE: 76 MMHG | TEMPERATURE: 98.2 F | WEIGHT: 157.4 LBS | SYSTOLIC BLOOD PRESSURE: 134 MMHG | HEIGHT: 63 IN | HEART RATE: 80 BPM | RESPIRATION RATE: 17 BRPM

## 2019-10-11 DIAGNOSIS — Z29.8 NEED FOR PROPHYLAXIS AGAINST URINARY TRACT INFECTION: ICD-10-CM

## 2019-10-11 DIAGNOSIS — D45 POLYCYTHEMIA VERA (HCC): Primary | ICD-10-CM

## 2019-10-11 PROCEDURE — 99214 OFFICE O/P EST MOD 30 MIN: CPT | Performed by: INTERNAL MEDICINE

## 2019-10-11 RX ORDER — NITROFURANTOIN MACROCRYSTALS 50 MG/1
50 CAPSULE ORAL DAILY
Qty: 30 CAPSULE | Refills: 5 | Status: SHIPPED | OUTPATIENT
Start: 2019-10-11 | End: 2019-10-14 | Stop reason: SDUPTHER

## 2019-10-11 NOTE — PROGRESS NOTES
[address] [facility] [place]  [address] [facility] [address]  [phone]  [address] [place],1965, [phone]  10/11/19    Discussion:   In summary, this is a 68-year-old female history of polycythemia  She is currently on Hydrea 1000 mg alternating with 500 mg  Blood counts are acceptable  Hematocrit is 46%  Platelets and white count only slightly higher than normal   Clinically she is functioning fairly well she has lost some weight  Her diabetes is under much better control with decreased hemoglobin A1c, etc   She feels well  She notes that she has urinary tract infections approximately every 8 weeks since starting Hydrea  We reviewed consideration for implementation of urinary prophylaxis with Macrodantin  There does not seem to be any interactions between Macrodantin and her other medications  This was prescribed  Continue blood work q 2 months  Follow-up in 6 months  I discussed the above with the patient  The patient  voiced understanding and agreement   ______________________________________________________________________    Chief Complaint   Patient presents with    Follow-up       HPI:     Polycythemia vera ([address] )    10/11/2017 Initial Diagnosis     Polycythemia vera ([facility] )  "I'm tired all the time ' Off/on since EARLE/BSO in 2005 2017 routine CBC showed white count of 16 8, hemoglobin 15 5, hematocrit 46 6, platelet count 620, normal differential abnormalities have been present during a few determinations dating back to 2014  FISH for [place] chromosome was negative  SYDNEY 2 mutation was negative  9/11/2018 -  Chemotherapy       Hydrea 500 mg p o  Daily  Interval History:  Clinically stable  ECOG-  0 - Asymptomatic    Review of Systems   Constitutional: Negative for appetite change, diaphoresis, fatigue and fever  HENT: Negative for sinus pain  Eyes: Negative for discharge     Respiratory: Negative for cough and shortness of breath  Cardiovascular: Negative for chest pain  Gastrointestinal: Negative for abdominal pain, constipation and diarrhea  Endocrine: Negative for cold intolerance  Genitourinary: Negative for difficulty urinating and hematuria  Musculoskeletal: Negative for joint swelling  Skin: Negative for rash  Allergic/Immunologic: Negative for environmental allergies  Neurological: Negative for dizziness and headaches  Hematological: Negative for adenopathy  Psychiatric/Behavioral: Negative for agitation  Past Medical History:   Diagnosis Date    Arthritis     Cancer ([place] [address] )     leukemia    Diabetes mellitus ([place] [address] )     DVT, lower extremity ([place] [address] )     History of echocardiogram 11/19/2008    Normal     History of nuclear stress test 11/19/2008    EF 66%, No evidence for ischemia      Hyperlipidemia     Hypertension     PONV (postoperative nausea and vomiting)      Patient Active Problem List   Diagnosis    Polycythemia vera ([place] [address] )    Type 2 diabetes mellitus with hyperglycemia, without long-term current use of insulin (HCC)    Acute cystitis without hematuria    Lactic acidosis    Elevated MCV    Hypercholesterolemia    Female bladder prolapse    Tobacco use    Leukocytosis    GI bleed    Hepatic steatosis    Essential hypertension    Peripheral vascular disease (HCC)    Aortoiliac occlusive disease (HCC)    Chest pain with moderate risk for cardiac etiology    Chronic leukemia ([place] [address] )    Atherosclerosis of artery of extremity with rest pain (HCC)       Current Outpatient Medications:     aspirin (ECOTRIN LOW STRENGTH) 81 mg EC tablet, Take 81 mg by mouth daily, Disp: , Rfl:     Dulaglutide (TRULICITY) 6 43 FW/8 5AQ SOPN, Inject 0 5 mL (0 75 mg total) under the skin once a week, Disp: 4 pen, Rfl: 3    Ertugliflozin L-PyroglutamicAc (STEGLATRO) 5 MG TABS, Take 1 tablet by mouth daily, Disp: 30 tablet, Rfl: 5    famotidine (PEPCID) 20 mg tablet, Take 1 tablet (20 mg total) by mouth 2 (two) times a day, Disp: 60 tablet, Rfl: 2    fluticasone (FLONASE) 50 mcg/act nasal spray, 2 sprays into each nostril daily, Disp: 1 Bottle, Rfl: 5    hydroxyurea (HYDREA) 500 mg capsule, Take 1 capsule on odd numbered days and 2 capsules on even number days  , Disp: 45 capsule, Rfl: 5    losartan (COZAAR) 25 mg tablet, Take 1 tablet (25 mg total) by mouth daily, Disp: 90 tablet, Rfl: 3    meloxicam (MOBIC) 7 5 mg tablet, Take 1 tablet (7 5 mg total) by mouth daily (Patient taking differently: Take 7 5 mg by mouth daily as needed ), Disp: 30 tablet, Rfl: 0    ondansetron (ZOFRAN-ODT) 4 mg disintegrating tablet, Take 1 tablet (4 mg total) by mouth every 6 (six) hours as needed for nausea or vomiting, Disp: 20 tablet, Rfl: 0    simvastatin (ZOCOR) 20 mg tablet, Take 1 tablet (20 mg total) by mouth daily at bedtime, Disp: 90 tablet, Rfl: 3    sitaGLIPtin (JANUVIA) 100 mg tablet, Take 1 tablet (100 mg total) by mouth daily, Disp: 90 tablet, Rfl: 3  Allergies   Allergen Reactions    Chantix [Varenicline] Other (See Comments)     Psychiatric side effects    Metformin      Lactic acidosis    Wellbutrin [Bupropion] Other (See Comments)     Psychiatric side effects    Clomid [Clomiphene] Hives    Latex Blisters     Past Surgical History:   Procedure Laterality Date    APPENDECTOMY      BLADDER AUGMENTATION      CHOLECYSTECTOMY      COLONOSCOPY      HERNIA REPAIR      HYSTERECTOMY      IR ABDOMINAL ANGIOGRAPHY / INTERVENTION  6/14/2019    KNEE ARTHROPLASTY      KNEE ARTHROSCOPY Bilateral     [facility] COLONOSCOPY FLX DX W/COLLJ SPEC WHEN PFRMD N/A 1/23/2018    Procedure: COLONOSCOPY;  Surgeon: Maya Lynn DO;  Location: [facility];  Service: Gastroenterology    [facility] Callie Petite 3RD+ ORD iMchael Rendon ABDL PEL/LXTR [facility] Left 6/14/2019    Procedure: ARTERIOGRAM-arteriography and possible endovascular intervention ;  Surgeon: Miladis Thomson MD;  Location:  MAIN OR;  Service: Vascular    REDUCTION MAMMAPLASTY      SHOULDER SURGERY      WISDOM TOOTH EXTRACTION       Social History     Objective:  Vitals:    10/11/19 1538   BP: 134/76   BP Location: Left arm   Patient Position: Sitting   Pulse: 80   Resp: 17   Temp: 98 2 °F (36 8 °C)   TempSrc: Tympanic   SpO2: 98%   Weight: 71 4 kg (157 lb 6 4 oz)   Height: 5' 3" (1 6 m)     Physical Exam   Constitutional: She is oriented to person, place, and time  She appears well-developed  HENT:   Head: Normocephalic  Eyes: Pupils are equal, round, and reactive to light  Neck: Neck supple  Cardiovascular: Normal rate  No murmur heard  Pulmonary/Chest: No respiratory distress  She has no wheezes  She has no rales  Abdominal: Soft  She exhibits no distension  There is no tenderness  There is no rebound  Musculoskeletal: She exhibits no edema  Lymphadenopathy:     She has no cervical adenopathy  Neurological: She is alert and oriented to person, place, and time  She displays normal reflexes  Skin: Skin is warm  No rash noted  Psychiatric: She has a normal mood and affect  Thought content normal          Labs: I personally reviewed the labs and imaging pertinent to this patient care

## 2019-10-14 DIAGNOSIS — Z29.8 NEED FOR PROPHYLAXIS AGAINST URINARY TRACT INFECTION: ICD-10-CM

## 2019-10-14 RX ORDER — NITROFURANTOIN MACROCRYSTALS 50 MG/1
50 CAPSULE ORAL DAILY
Qty: 30 CAPSULE | Refills: 5 | Status: SHIPPED | OUTPATIENT
Start: 2019-10-14 | End: 2020-04-06 | Stop reason: SDUPTHER

## 2019-10-28 ENCOUNTER — TELEPHONE (OUTPATIENT)
Dept: SURGICAL ONCOLOGY | Facility: CLINIC | Age: 54
End: 2019-10-28

## 2019-10-28 DIAGNOSIS — D45 POLYCYTHEMIA VERA (HCC): ICD-10-CM

## 2019-10-28 RX ORDER — HYDROXYUREA 500 MG/1
CAPSULE ORAL
Qty: 45 CAPSULE | Refills: 5 | Status: SHIPPED | OUTPATIENT
Start: 2019-10-28 | End: 2020-04-06 | Stop reason: SDUPTHER

## 2019-10-28 NOTE — TELEPHONE ENCOUNTER
Pt called and needs a prescription renewal for Hydroxyurea  Please call her when it has been renewed  Pt phone is [phone]

## 2019-11-21 ENCOUNTER — OFFICE VISIT (OUTPATIENT)
Dept: FAMILY MEDICINE CLINIC | Facility: CLINIC | Age: 54
End: 2019-11-21
Payer: COMMERCIAL

## 2019-11-21 VITALS
HEIGHT: 63 IN | WEIGHT: 164.6 LBS | BODY MASS INDEX: 29.16 KG/M2 | TEMPERATURE: 96.9 F | OXYGEN SATURATION: 97 % | DIASTOLIC BLOOD PRESSURE: 82 MMHG | SYSTOLIC BLOOD PRESSURE: 140 MMHG | HEART RATE: 91 BPM

## 2019-11-21 DIAGNOSIS — J32.9 RECURRENT SINUSITIS: Primary | ICD-10-CM

## 2019-11-21 PROCEDURE — 4004F PT TOBACCO SCREEN RCVD TLK: CPT | Performed by: PHYSICIAN ASSISTANT

## 2019-11-21 PROCEDURE — 99213 OFFICE O/P EST LOW 20 MIN: CPT | Performed by: PHYSICIAN ASSISTANT

## 2019-11-21 RX ORDER — AMOXICILLIN AND CLAVULANATE POTASSIUM 875; 125 MG/1; MG/1
1 TABLET, FILM COATED ORAL EVERY 12 HOURS SCHEDULED
Qty: 14 TABLET | Refills: 0 | Status: SHIPPED | OUTPATIENT
Start: 2019-11-21 | End: 2019-11-28

## 2019-11-21 NOTE — PROGRESS NOTES
Assessment/Plan:    Problem List Items Addressed This Visit     None      Visit Diagnoses     Recurrent sinusitis    -  Primary    Relevant Medications    amoxicillin-clavulanate (AUGMENTIN) 875-125 mg per tablet    Other Relevant Orders    Ambulatory Referral to Otolaryngology         Diagnoses and all orders for this visit:    Recurrent sinusitis  -     Ambulatory Referral to Otolaryngology; Future  -     amoxicillin-clavulanate (AUGMENTIN) 875-125 mg per tablet; Take 1 tablet by mouth every 12 (twelve) hours for 7 days        Will refer to ENT for recurrent sinus infections  Will place on Augmentin  Recommended that she continue symptomatic treatment and push fluids  She will let us know if symptoms do not improve or worsen  Subjective:      Patient ID: Sheyla Vincent is a 47 y o  female  Ivan Vela is a 47year old female who is here complaining of sinus pressure, congestion, headache, and sinus pain for the past week  She has been using OTC sinus medication, flonase, and saline nasal spray, but it is not providing any relief  She denies any fevers, chills, chest pains, or shortness of breath  The following portions of the patient's history were reviewed and updated as appropriate:   She has a past medical history of Arthritis, Cancer ([facility] [address] Diabetes mellitus ([facility] ), DVT, lower extremity ([facility] [address] ), History of echocardiogram (11/19/2008), History of nuclear stress test (11/19/2008), Hyperlipidemia, Hypertension, and PONV (postoperative nausea and vomiting)  ,  does not have any pertinent problems on file  ,   has a past surgical history that includes Hysterectomy; Reduction mammaplasty; Appendectomy; Cholecystectomy; Bladder augmentation; [place] tooth extraction; Knee Arthroplasty; Knee arthroscopy (Bilateral); Hernia repair; Shoulder surgery; pr colonoscopy flx dx w/collj spec when pfrmd (N/A, 1/23/2018);  Colonoscopy; pr slctv cathj 3rd+ ord slctv abdl pel/lxtr brnch (Left, 6/14/2019); and IR abdominal angiography / intervention (6/14/2019)  ,  family history includes Cancer in her mother; Diabetes in her father; Heart disease in her father  ,   reports that she has been smoking cigarettes  She has been smoking about 0 25 packs per day  She has never used smokeless tobacco  She reports that she drank alcohol  She reports that she does not use drugs  ,  is allergic to chantix [varenicline]; metformin; wellbutrin [bupropion]; clomiphene; and latex     Current Outpatient Medications   Medication Sig Dispense Refill    aspirin (ECOTRIN LOW STRENGTH) 81 mg EC tablet Take 81 mg by mouth daily      Dulaglutide (TRULICITY) 0 79 XY/7 7LQ SOPN Inject 0 5 mL (0 75 mg total) under the skin once a week 4 pen 3    Ertugliflozin L-PyroglutamicAc (STEGLATRO) 5 MG TABS Take 1 tablet by mouth daily 30 tablet 5    famotidine (PEPCID) 20 mg tablet Take 1 tablet (20 mg total) by mouth 2 (two) times a day 60 tablet 2    fluticasone (FLONASE) 50 mcg/act nasal spray 2 sprays into each nostril daily 1 Bottle 5    hydroxyurea (HYDREA) 500 mg capsule Take 1 capsule on odd numbered days and 2 capsules on even number days   45 capsule 5    losartan (COZAAR) 25 mg tablet Take 1 tablet (25 mg total) by mouth daily 90 tablet 3    meloxicam (MOBIC) 7 5 mg tablet Take 1 tablet (7 5 mg total) by mouth daily (Patient taking differently: Take 7 5 mg by mouth daily as needed ) 30 tablet 0    nitrofurantoin (MACRODANTIN) 50 mg capsule Take 1 capsule (50 mg total) by mouth daily 30 capsule 5    ondansetron (ZOFRAN-ODT) 4 mg disintegrating tablet Take 1 tablet (4 mg total) by mouth every 6 (six) hours as needed for nausea or vomiting 20 tablet 0    simvastatin (ZOCOR) 20 mg tablet Take 1 tablet (20 mg total) by mouth daily at bedtime 90 tablet 3    sitaGLIPtin (JANUVIA) 100 mg tablet Take 1 tablet (100 mg total) by mouth daily 90 tablet 3    amoxicillin-clavulanate (AUGMENTIN) 875-125 mg per tablet Take 1 tablet by mouth every 12 (twelve) hours for 7 days 14 tablet 0     No current facility-administered medications for this visit  Review of Systems   Constitutional: Negative for chills, diaphoresis, fatigue and fever  HENT: Positive for congestion, postnasal drip, sinus pressure and sinus pain  Negative for ear pain, rhinorrhea, sneezing, sore throat and trouble swallowing  Eyes: Negative for pain and visual disturbance  Respiratory: Negative for apnea, cough, shortness of breath and wheezing  Cardiovascular: Negative for chest pain and palpitations  Gastrointestinal: Negative for abdominal pain, constipation, diarrhea, nausea and vomiting  Genitourinary: Negative for dysuria and hematuria  Musculoskeletal: Negative for arthralgias, gait problem and myalgias  Neurological: Positive for headaches  Negative for dizziness, syncope, weakness, light-headedness and numbness  Psychiatric/Behavioral: Negative for suicidal ideas  The patient is not nervous/anxious  Objective:  Vitals:    11/21/19 1214   BP: 140/82   BP Location: Left arm   Patient Position: Sitting   Pulse: 91   Temp: (!) 96 9 °F (36 1 °C)   SpO2: 97%   Weight: 74 7 kg (164 lb 9 6 oz)   Height: 5' 3" (1 6 m)     Body mass index is 29 16 kg/m²  Physical Exam   Constitutional: She is oriented to person, place, and time  She appears well-developed and well-nourished  HENT:   Head: Normocephalic and atraumatic  Right Ear: Tympanic membrane, external ear and ear canal normal    Left Ear: Tympanic membrane, external ear and ear canal normal    Nose: Mucosal edema present  Right sinus exhibits maxillary sinus tenderness  Left sinus exhibits maxillary sinus tenderness  Mouth/Throat: Oropharynx is clear and moist and mucous membranes are normal  No oropharyngeal exudate, posterior oropharyngeal edema or posterior oropharyngeal erythema  Hyperemic nasal mucosa   Eyes: Pupils are equal, round, and reactive to light   EOM are normal    Neck: Normal range of motion  Neck supple  Cardiovascular: Normal rate, regular rhythm and normal heart sounds  Exam reveals no gallop and no friction rub  No murmur heard  Pulmonary/Chest: Effort normal and breath sounds normal  No respiratory distress  She has no wheezes  She has no rales  Musculoskeletal: Normal range of motion  Lymphadenopathy:     She has no cervical adenopathy  Neurological: She is alert and oriented to person, place, and time  Skin: Skin is warm and dry  Psychiatric: She has a normal mood and affect  Her behavior is normal  Judgment and thought content normal    Vitals reviewed  Tobacco Cessation Counseling: Tobacco cessation counseling was provided   The patient is sincerely urged to quit consumption of tobacco  She is not ready to quit tobacco

## 2019-12-20 DIAGNOSIS — K29.00 ACUTE GASTRITIS WITHOUT HEMORRHAGE, UNSPECIFIED GASTRITIS TYPE: ICD-10-CM

## 2019-12-23 DIAGNOSIS — K29.00 ACUTE GASTRITIS WITHOUT HEMORRHAGE, UNSPECIFIED GASTRITIS TYPE: ICD-10-CM

## 2019-12-23 RX ORDER — FAMOTIDINE 20 MG/1
20 TABLET, FILM COATED ORAL 2 TIMES DAILY
Qty: 60 TABLET | Refills: 5 | Status: SHIPPED | OUTPATIENT
Start: 2019-12-23 | End: 2019-12-23 | Stop reason: SDUPTHER

## 2019-12-23 RX ORDER — FAMOTIDINE 20 MG/1
TABLET, FILM COATED ORAL
Qty: 60 TABLET | Refills: 0 | Status: SHIPPED | OUTPATIENT
Start: 2019-12-23 | End: 2019-12-23 | Stop reason: SDUPTHER

## 2019-12-23 RX ORDER — FAMOTIDINE 20 MG/1
20 TABLET, FILM COATED ORAL 2 TIMES DAILY
Qty: 180 TABLET | Refills: 1 | Status: SHIPPED | OUTPATIENT
Start: 2019-12-23 | End: 2020-06-10 | Stop reason: SDUPTHER

## 2019-12-27 ENCOUNTER — OFFICE VISIT (OUTPATIENT)
Dept: OTOLARYNGOLOGY | Facility: CLINIC | Age: 54
End: 2019-12-27
Payer: COMMERCIAL

## 2019-12-27 VITALS
SYSTOLIC BLOOD PRESSURE: 118 MMHG | BODY MASS INDEX: 28.7 KG/M2 | OXYGEN SATURATION: 97 % | HEART RATE: 87 BPM | WEIGHT: 162 LBS | DIASTOLIC BLOOD PRESSURE: 72 MMHG | HEIGHT: 63 IN

## 2019-12-27 DIAGNOSIS — J32.9 RECURRENT SINUSITIS: Primary | ICD-10-CM

## 2019-12-27 PROCEDURE — 99243 OFF/OP CNSLTJ NEW/EST LOW 30: CPT | Performed by: OTOLARYNGOLOGY

## 2019-12-27 PROCEDURE — 31231 NASAL ENDOSCOPY DX: CPT | Performed by: OTOLARYNGOLOGY

## 2019-12-27 RX ORDER — CLARITHROMYCIN 500 MG/1
500 TABLET, COATED ORAL EVERY 12 HOURS SCHEDULED
Qty: 20 TABLET | Refills: 0 | Status: SHIPPED | OUTPATIENT
Start: 2019-12-27 | End: 2020-01-06

## 2019-12-27 RX ORDER — PREDNISONE 10 MG/1
TABLET ORAL
Qty: 30 TABLET | Refills: 0 | Status: SHIPPED | OUTPATIENT
Start: 2019-12-27 | End: 2020-01-31

## 2019-12-27 NOTE — PROGRESS NOTES
Consultation - Otolaryngology - Head and Neck Surgery  Facial Plastic and Reconstructive Surgery  Nia Napier 47 y o  female MRN: [id number]  Encounter: [id number]        Assessment/Plan:  1  Recurrent sinusitis  Ambulatory Referral to Otolaryngology    clarithromycin (BIAXIN) 500 mg tablet    predniSONE 10 mg tablet    CT sinus wo contrast       Chronic sinusitis: We discussed the nature chronic sinusitis  We discussed starting 21 day course of antibiotics, 12 day steroid taper, and nasal steroids  We discussed that the use of appropriate medical therapy can substantially improve symptoms, but neither medications or surgery cure the disease and needs ongoing treatment  We discussed the risks of antibiotics, including, but not limited to, incomplete therapy, C diff colitis, medication her reactions, medication allergy, and others  We discussed the risks benefits and alternatives to oral prednisone therapy, including, but not limited to, GI distress in worsening of ulcers, elevation of blood sugar and diabetic complications, psychiatric complications, and avascular necrosis of have  We will obtain a CT scan after therapy and have the patient return in 1 month for re-evaluation  History of Present Illness   Physician Requesting Consult: Lyubov Johnston DO  Reason for Consult / Principal Problem:  Chronic sinusitis  HPI: Nia Napier is a 47y o  year old female who presents with recurrent sinus infections over the past few months  She has had greater than 3 months of symptoms of facial pressure, purulent drainage, dental pain, postnasal drip, nasal congestion, and ear pressure  She has a history of leukemia for which she is taking hydroxyurea  Most recently she finished a round of antibiotics with Augmentin approximately 3 weeks ago  She uses fluticasone daily  No recent imaging of her head      Review of systems:  10 Point ROS was performed and negative except as above or otherwise noted in the medical record  Historical Information   Past Medical History:   Diagnosis Date    Arthritis     Cancer ([facility] )     leukemia    Diabetes mellitus ([facility] )     DVT, lower extremity ([facility] )     History of echocardiogram 11/19/2008    Normal     History of nuclear stress test 11/19/2008    EF 66%, No evidence for ischemia      Hyperlipidemia     Hypertension     PONV (postoperative nausea and vomiting)      Past Surgical History:   Procedure Laterality Date    APPENDECTOMY      BLADDER AUGMENTATION      CHOLECYSTECTOMY      COLONOSCOPY      HERNIA REPAIR      HYSTERECTOMY      IR ABDOMINAL ANGIOGRAPHY / INTERVENTION  6/14/2019    KNEE ARTHROPLASTY      KNEE ARTHROSCOPY Bilateral     NV COLONOSCOPY FLX DX W/COLLJ SPEC WHEN PFRMD N/A 1/23/2018    Procedure: COLONOSCOPY;  Surgeon: Maya Lynn DO;  Location: [facility];  Service: Gastroenterology    NV [facility] 3RD+ ORD SLCTV ABDL PEL/LXTR [facility] Left 6/14/2019    Procedure: ARTERIOGRAM-arteriography and possible endovascular intervention ;  Surgeon: Miladis Thomson MD;  Location:  MAIN OR;  Service: Vascular    REDUCTION MAMMAPLASTY      SHOULDER SURGERY      WISDOM TOOTH EXTRACTION       Social History   Social History     Substance and Sexual Activity   Alcohol Use Not Currently    Alcohol/week: 0 0 standard drinks    Frequency: Never    Drinks per session: Patient refused    Binge frequency: Never    Comment: N/A     Social History     Substance and Sexual Activity   Drug Use No     Social History     Tobacco Use   Smoking Status Current Every Day Smoker    Packs/day: 0 25    Types: Cigarettes   Smokeless Tobacco Never Used     Family History:   Family History   Problem Relation Age of Onset    Cancer Mother     Heart disease Father     Diabetes Father        Current Outpatient Medications on File Prior to Visit   Medication Sig    aspirin (ECOTRIN LOW STRENGTH) 81 mg EC tablet Take 81 mg by mouth daily    Dulaglutide (TRULICITY) 0 75 MG/0 5ML SOPN Inject 0 5 mL (0 75 mg total) under the skin once a week    Ertugliflozin L-PyroglutamicAc (STEGLATRO) 5 MG TABS Take 1 tablet by mouth daily    famotidine (PEPCID) 20 mg tablet Take 1 tablet (20 mg total) by mouth 2 (two) times a day    fluticasone (FLONASE) 50 mcg/act nasal spray 2 sprays into each nostril daily    hydroxyurea (HYDREA) 500 mg capsule Take 1 capsule on odd numbered days and 2 capsules on even number days   losartan (COZAAR) 25 mg tablet Take 1 tablet (25 mg total) by mouth daily    meloxicam (MOBIC) 7 5 mg tablet Take 1 tablet (7 5 mg total) by mouth daily (Patient taking differently: Take 7 5 mg by mouth daily as needed )    nitrofurantoin (MACRODANTIN) 50 mg capsule Take 1 capsule (50 mg total) by mouth daily    ondansetron (ZOFRAN-ODT) 4 mg disintegrating tablet Take 1 tablet (4 mg total) by mouth every 6 (six) hours as needed for nausea or vomiting    simvastatin (ZOCOR) 20 mg tablet Take 1 tablet (20 mg total) by mouth daily at bedtime    sitaGLIPtin (JANUVIA) 100 mg tablet Take 1 tablet (100 mg total) by mouth daily     No current facility-administered medications on file prior to visit  Allergies   Allergen Reactions    Chantix [Varenicline] Other (See Comments)     Psychiatric side effects    Metformin      Lactic acidosis    Wellbutrin [Bupropion] Other (See Comments)     Psychiatric side effects    Clomiphene Hives    Latex Blisters       Vitals:    12/27/19 1059   BP: 118/72   Pulse: 87   SpO2: 97%       Physical Exam   Constitutional: Oriented to person, place, and time  Well-developed and well-nourished, no apparent distress, non-toxic appearance  Cooperative, able to hear and answer questions without difficulty  Voice: Normal voice quality  Head: Normocephalic, atraumatic  No scars, masses or lesions  Face: Symmetric, no edema, no sinus tenderness  Eyes: Vision grossly intact, extra-ocular movement intact    Ears: External ears normal  Tympanic membranes intact with intact normal landmarks  No post-auricular erythema or tenderness  Nose: Septum intact, nares clear  Mucosa moist, turbinates well appearing  No crusting, polyps or discharge evident  Oral cavity: Dentition intact  Mucosa moist, lips without lesions or masses  Tongue mobile, floor of mouth soft and flat  Hard palate intact  No masses or lesions  Oropharynx: Uvula is midline, soft palate intact without lesion or mass  Oropharyngeal inlet without obstruction  Tonsils unremarkable  Posterior pharyngeal wall clear  No masses or lesions  Salivary glands:  Parotid glands and submandibular glands symmetric, no enlargement or tenderness  Neck: Normal laryngeal elevation with swallow  Trachea midline  No masses or lesions  No palpable adenopathy  Thyroid: Without tenderness or palpable nodules  Pulmonary/Chest: Normal effort and rate  No respiratory distress  No stertor or stridor  Musculoskeletal: Normal range of motion  Neurological: Cranial nerves 2-12 intact  Skin: Skin is warm and dry  Psychiatric: Normal mood and affect  Procedure:  Nasal endoscopy    Indications:  Evaluation for sinusitis    Procedure in detail:  After informed verbal consent was obtained from the patient bilateral nares were anesthetized with 1% lidocaine and oxymetazoline spray  After adequate time for anesthesia patient's bilateral nasal cavities, middle meatus, and sphenoethmoid recesses were evaluated with the following findings  Patient tolerated the procedure well without complications  Findings:   No significant mucoid purulence or polyposis seen in bilateral nasal cavities  Moderate crusting in the anterior nasal cavity    Imaging Studies: I have personally reviewed pertinent reports  Lab Results: I have personally reviewed pertinent lab results  Greater than 40 minutes were spent in consultation   More than 1/2 of that time was spent in counselling and coordination of care

## 2019-12-27 NOTE — PROGRESS NOTES
Review of Systems   Constitutional: Negative  HENT: Positive for congestion, postnasal drip, sinus pressure and sinus pain  Eyes: Negative  Respiratory: Positive for cough  Cardiovascular: Negative  Gastrointestinal: Negative  Endocrine: Negative  Genitourinary: Negative  Musculoskeletal: Negative  Skin: Negative  Allergic/Immunologic: Negative  Neurological: Negative  Hematological: Negative  Psychiatric/Behavioral: Negative

## 2019-12-30 ENCOUNTER — TELEPHONE (OUTPATIENT)
Dept: SLEEP CENTER | Facility: CLINIC | Age: 54
End: 2019-12-30

## 2019-12-30 NOTE — TELEPHONE ENCOUNTER
Patient called the office today and stated that the pharmacy didn't give her the biaxin due to an interaction between simvastatin  She went all weekend without it  Need another one called in or it is safe to take  Pharmacy is [facility] in [place]

## 2019-12-30 NOTE — TELEPHONE ENCOUNTER
It is safe to take  I understand the risks, but she needs the medication and the pharmacy should have called

## 2019-12-30 NOTE — TELEPHONE ENCOUNTER
I called and spoke with the pharmacist at [facility] in [place] and he stated that they did call the office of the number on the script  We didn't receive a message here in [place]  [facility]  I did state that it was okay to take both per doctor

## 2020-01-06 DIAGNOSIS — E11.65 TYPE 2 DIABETES MELLITUS WITH HYPERGLYCEMIA, WITHOUT LONG-TERM CURRENT USE OF INSULIN (HCC): ICD-10-CM

## 2020-01-09 ENCOUNTER — HOSPITAL ENCOUNTER (OUTPATIENT)
Dept: CT IMAGING | Facility: HOSPITAL | Age: 55
Discharge: HOME | End: 2020-01-09
Attending: OTOLARYNGOLOGY
Payer: COMMERCIAL

## 2020-01-09 DIAGNOSIS — J32.9 RECURRENT SINUSITIS: ICD-10-CM

## 2020-01-09 PROCEDURE — 70486 CT MAXILLOFACIAL W/O DYE: CPT

## 2020-01-14 ENCOUNTER — HOSPITAL ENCOUNTER (OUTPATIENT)
Dept: NON INVASIVE DIAGNOSTICS | Facility: HOSPITAL | Age: 55
Discharge: HOME | End: 2020-01-14
Attending: SURGERY
Payer: COMMERCIAL

## 2020-01-14 DIAGNOSIS — I70.229 ATHEROSCLEROSIS OF ARTERY OF EXTREMITY WITH REST PAIN (HCC): ICD-10-CM

## 2020-01-14 PROCEDURE — 93925 LOWER EXTREMITY STUDY: CPT | Performed by: SURGERY

## 2020-01-14 PROCEDURE — 93923 UPR/LXTR ART STDY 3+ LVLS: CPT

## 2020-01-14 PROCEDURE — 93922 UPR/L XTREMITY ART 2 LEVELS: CPT | Performed by: SURGERY

## 2020-01-14 PROCEDURE — 93925 LOWER EXTREMITY STUDY: CPT

## 2020-01-31 ENCOUNTER — APPOINTMENT (OUTPATIENT)
Dept: LAB | Facility: MEDICAL CENTER | Age: 55
End: 2020-01-31
Payer: COMMERCIAL

## 2020-01-31 ENCOUNTER — OFFICE VISIT (OUTPATIENT)
Dept: OTOLARYNGOLOGY | Facility: CLINIC | Age: 55
End: 2020-01-31
Payer: COMMERCIAL

## 2020-01-31 VITALS
BODY MASS INDEX: 29.41 KG/M2 | WEIGHT: 166 LBS | OXYGEN SATURATION: 95 % | HEIGHT: 63 IN | DIASTOLIC BLOOD PRESSURE: 80 MMHG | HEART RATE: 85 BPM | SYSTOLIC BLOOD PRESSURE: 130 MMHG

## 2020-01-31 DIAGNOSIS — J30.9 ALLERGIC RHINITIS, UNSPECIFIED SEASONALITY, UNSPECIFIED TRIGGER: Primary | ICD-10-CM

## 2020-01-31 PROCEDURE — 31231 NASAL ENDOSCOPY DX: CPT | Performed by: OTOLARYNGOLOGY

## 2020-01-31 PROCEDURE — 99213 OFFICE O/P EST LOW 20 MIN: CPT | Performed by: OTOLARYNGOLOGY

## 2020-01-31 NOTE — PROGRESS NOTES
Yoana Coppola is a 47 y  o female who presents for re-evaluation of nasal congestion, rhinitis and facial pressure  She notes no significant improvement with maximal medical therapy  She underwent CT scan which demonstrated no significant sinus disease and left septal deviation with bilateral turbinate hypertrophy  No purulent nasal drainage  She continues to have facial pain and headaches  Past Medical History:   Diagnosis Date    Arthritis     Cancer ([facility] [address] )     leukemia    [facility]     DVT, lower extremity ([facility] )     History of echocardiogram 11/19/2008    Normal     History of nuclear stress test 11/19/2008    EF 66%, No evidence for ischemia   Hyperlipidemia     Hypertension     PONV (postoperative nausea and vomiting)        /80 (BP Location: Left arm, Cuff Size: Standard)   Pulse 85   Ht 5' 3" (1 6 m)   Wt 75 3 kg (166 lb)   SpO2 95%   BMI 29 41 kg/m²       Physical Exam   Constitutional: Oriented to person, place, and time  Well-developed and well-nourished, no apparent distress, non-toxic appearance  Cooperative, able to hear and answer questions without difficulty  Voice: Normal voice quality  Head: Normocephalic, atraumatic  No scars, masses or lesions  Face: Symmetric, no edema, no sinus tenderness  Eyes: Vision grossly intact, extra-ocular movement intact  Ears: External ear normal   Bilateral tympanic membranes are intact with intact normal landmarks  No post-auricular erythema or tenderness  Nose: Septum midline, nares clear  Mucosa moist, turbinates well appearing  No crusting, polyps or discharge evident  Oral cavity: Dentition intact  Mucosa moist, lips normal   Tongue mobile, floor of mouth normal   Hard palate unremarkable  No masses or lesions  Oropharynx: Uvula is midline, soft palate normal   Unremarkable oropharyngeal inlet  Tonsils unremarkable  Posterior pharyngeal wall clear  No masses or lesions    Salivary glands:  Parotid glands and submandibular glands symmetric, no enlargement or tenderness  Neck: Normal laryngeal elevation with swallow  Trachea midline  No masses or lesions  No palpable adenopathy  Thyroid: normal in size, unremarkable without tenderness or palpable nodules  Pulmonary/Chest: Normal effort and rate  No respiratory distress  Musculoskeletal: Normal range of motion  Neurological: Cranial nerves 2-12 intact  Skin: Skin is warm and dry  Psychiatric: Normal mood and affect  Procedure:  Nasal endoscopy    Indications:  Evaluation for sinusitis    Procedure in detail:  After informed verbal consent was obtained from the patient bilateral nares were anesthetized with 1% lidocaine and oxymetazoline spray  After adequate time for anesthesia patient's bilateral nasal cavities, middle meatus, and sphenoethmoid recesses were evaluated with the following findings  Patient tolerated the procedure well without complications  Findings:   No significant mucoid purulence or polyposis seen in bilateral nasal cavities  Left deviated septum  Bilateral turbinate hypertrophy  A/P: Allergic rhinitis: Will obtain allergy testing and see her back at that time

## 2020-03-31 DIAGNOSIS — E11.65 TYPE 2 DIABETES MELLITUS WITH HYPERGLYCEMIA, WITHOUT LONG-TERM CURRENT USE OF INSULIN (HCC): ICD-10-CM

## 2020-04-06 ENCOUNTER — TELEPHONE (OUTPATIENT)
Dept: HEMATOLOGY ONCOLOGY | Facility: MEDICAL CENTER | Age: 55
End: 2020-04-06

## 2020-04-06 DIAGNOSIS — D45 POLYCYTHEMIA VERA (HCC): Primary | ICD-10-CM

## 2020-04-06 DIAGNOSIS — Z29.8 NEED FOR PROPHYLAXIS AGAINST URINARY TRACT INFECTION: ICD-10-CM

## 2020-04-06 RX ORDER — HYDROXYUREA 500 MG/1
CAPSULE ORAL
Qty: 45 CAPSULE | Refills: 5 | Status: SHIPPED | OUTPATIENT
Start: 2020-04-06 | End: 2020-10-28 | Stop reason: SDUPTHER

## 2020-04-06 RX ORDER — NITROFURANTOIN MACROCRYSTALS 50 MG/1
50 CAPSULE ORAL DAILY
Qty: 30 CAPSULE | Refills: 1 | Status: SHIPPED | OUTPATIENT
Start: 2020-04-06 | End: 2020-06-30 | Stop reason: SDUPTHER

## 2020-04-14 ENCOUNTER — APPOINTMENT (OUTPATIENT)
Dept: LAB | Facility: MEDICAL CENTER | Age: 55
End: 2020-04-14
Payer: COMMERCIAL

## 2020-04-17 ENCOUNTER — OFFICE VISIT (OUTPATIENT)
Dept: HEMATOLOGY ONCOLOGY | Facility: CLINIC | Age: 55
End: 2020-04-17
Payer: COMMERCIAL

## 2020-04-17 VITALS
SYSTOLIC BLOOD PRESSURE: 124 MMHG | HEIGHT: 63 IN | HEART RATE: 91 BPM | DIASTOLIC BLOOD PRESSURE: 78 MMHG | BODY MASS INDEX: 29.41 KG/M2 | TEMPERATURE: 97.9 F | OXYGEN SATURATION: 97 % | WEIGHT: 166 LBS | RESPIRATION RATE: 17 BRPM

## 2020-04-17 DIAGNOSIS — D72.829 LEUKOCYTOSIS, UNSPECIFIED TYPE: ICD-10-CM

## 2020-04-17 DIAGNOSIS — D45 POLYCYTHEMIA VERA (HCC): Primary | ICD-10-CM

## 2020-04-17 PROCEDURE — 99214 OFFICE O/P EST MOD 30 MIN: CPT | Performed by: INTERNAL MEDICINE

## 2020-04-17 PROCEDURE — 3074F SYST BP LT 130 MM HG: CPT | Performed by: INTERNAL MEDICINE

## 2020-04-17 PROCEDURE — 2022F DILAT RTA XM EVC RTNOPTHY: CPT | Performed by: INTERNAL MEDICINE

## 2020-04-17 PROCEDURE — 3008F BODY MASS INDEX DOCD: CPT | Performed by: INTERNAL MEDICINE

## 2020-04-17 PROCEDURE — 4004F PT TOBACCO SCREEN RCVD TLK: CPT | Performed by: INTERNAL MEDICINE

## 2020-04-17 PROCEDURE — 3078F DIAST BP <80 MM HG: CPT | Performed by: INTERNAL MEDICINE

## 2020-04-19 DIAGNOSIS — E11.9 TYPE 2 DIABETES MELLITUS WITHOUT COMPLICATION, WITHOUT LONG-TERM CURRENT USE OF INSULIN (HCC): ICD-10-CM

## 2020-04-19 DIAGNOSIS — E78.2 MIXED HYPERLIPIDEMIA: ICD-10-CM

## 2020-04-20 DIAGNOSIS — E11.9 TYPE 2 DIABETES MELLITUS WITHOUT COMPLICATION, WITHOUT LONG-TERM CURRENT USE OF INSULIN (HCC): ICD-10-CM

## 2020-04-20 DIAGNOSIS — E78.2 MIXED HYPERLIPIDEMIA: ICD-10-CM

## 2020-04-20 PROCEDURE — 4010F ACE/ARB THERAPY RXD/TAKEN: CPT | Performed by: FAMILY MEDICINE

## 2020-04-20 RX ORDER — SIMVASTATIN 20 MG
20 TABLET ORAL
Qty: 30 TABLET | Refills: 3 | Status: SHIPPED | OUTPATIENT
Start: 2020-04-20 | End: 2020-08-19 | Stop reason: SDUPTHER

## 2020-04-20 RX ORDER — LOSARTAN POTASSIUM 25 MG/1
25 TABLET ORAL DAILY
Qty: 90 TABLET | Refills: 3 | Status: SHIPPED | OUTPATIENT
Start: 2020-04-20 | End: 2021-02-15 | Stop reason: SDUPTHER

## 2020-04-20 RX ORDER — SIMVASTATIN 20 MG
TABLET ORAL
Qty: 30 TABLET | Refills: 3 | Status: SHIPPED | OUTPATIENT
Start: 2020-04-20 | End: 2020-04-20 | Stop reason: SDUPTHER

## 2020-04-20 RX ORDER — SITAGLIPTIN 100 MG/1
TABLET, FILM COATED ORAL
Qty: 30 TABLET | Refills: 3 | Status: SHIPPED | OUTPATIENT
Start: 2020-04-20 | End: 2020-04-20 | Stop reason: SDUPTHER

## 2020-04-23 ENCOUNTER — APPOINTMENT (OUTPATIENT)
Dept: LAB | Facility: MEDICAL CENTER | Age: 55
End: 2020-04-23
Payer: COMMERCIAL

## 2020-04-23 DIAGNOSIS — D45 POLYCYTHEMIA VERA (HCC): ICD-10-CM

## 2020-04-23 LAB
ALBUMIN SERPL BCP-MCNC: 3.7 G/DL (ref 3.5–5)
ALP SERPL-CCNC: 102 U/L (ref 46–116)
ALT SERPL W P-5'-P-CCNC: 26 U/L (ref 12–78)
ANION GAP SERPL CALCULATED.3IONS-SCNC: 6 MMOL/L (ref 4–13)
AST SERPL W P-5'-P-CCNC: 20 U/L (ref 5–45)
BASOPHILS # BLD AUTO: 0.07 THOUSANDS/ΜL (ref 0–0.1)
BASOPHILS NFR BLD AUTO: 1 % (ref 0–1)
BILIRUB SERPL-MCNC: 0.69 MG/DL (ref 0.2–1)
BUN SERPL-MCNC: 12 MG/DL (ref 5–25)
CALCIUM SERPL-MCNC: 9.2 MG/DL (ref 8.3–10.1)
CHLORIDE SERPL-SCNC: 106 MMOL/L (ref 100–108)
CO2 SERPL-SCNC: 28 MMOL/L (ref 21–32)
CREAT SERPL-MCNC: 0.85 MG/DL (ref 0.6–1.3)
CRP SERPL QL: 10.3 MG/L
EOSINOPHIL # BLD AUTO: 0.19 THOUSAND/ΜL (ref 0–0.61)
EOSINOPHIL NFR BLD AUTO: 2 % (ref 0–6)
ERYTHROCYTE [DISTWIDTH] IN BLOOD BY AUTOMATED COUNT: 13.8 % (ref 11.6–15.1)
ERYTHROCYTE [SEDIMENTATION RATE] IN BLOOD: 64 MM/HOUR (ref 0–20)
GFR SERPL CREATININE-BSD FRML MDRD: 78 ML/MIN/1.73SQ M
GLUCOSE P FAST SERPL-MCNC: 133 MG/DL (ref 65–99)
HCT VFR BLD AUTO: 50.4 % (ref 34.8–46.1)
HGB BLD-MCNC: 16.8 G/DL (ref 11.5–15.4)
IMM GRANULOCYTES # BLD AUTO: 0.04 THOUSAND/UL (ref 0–0.2)
IMM GRANULOCYTES NFR BLD AUTO: 0 % (ref 0–2)
LYMPHOCYTES # BLD AUTO: 3.36 THOUSANDS/ΜL (ref 0.6–4.47)
LYMPHOCYTES NFR BLD AUTO: 30 % (ref 14–44)
MCH RBC QN AUTO: 36.7 PG (ref 26.8–34.3)
MCHC RBC AUTO-ENTMCNC: 33.3 G/DL (ref 31.4–37.4)
MCV RBC AUTO: 110 FL (ref 82–98)
MONOCYTES # BLD AUTO: 0.53 THOUSAND/ΜL (ref 0.17–1.22)
MONOCYTES NFR BLD AUTO: 5 % (ref 4–12)
NEUTROPHILS # BLD AUTO: 6.92 THOUSANDS/ΜL (ref 1.85–7.62)
NEUTS SEG NFR BLD AUTO: 62 % (ref 43–75)
NRBC BLD AUTO-RTO: 0 /100 WBCS
PLATELET # BLD AUTO: 316 THOUSANDS/UL (ref 149–390)
PMV BLD AUTO: 9.8 FL (ref 8.9–12.7)
POTASSIUM SERPL-SCNC: 4.4 MMOL/L (ref 3.5–5.3)
PROT SERPL-MCNC: 7.8 G/DL (ref 6.4–8.2)
RBC # BLD AUTO: 4.58 MILLION/UL (ref 3.81–5.12)
SODIUM SERPL-SCNC: 140 MMOL/L (ref 136–145)
URATE SERPL-MCNC: 3.6 MG/DL (ref 2–6.8)
WBC # BLD AUTO: 11.11 THOUSAND/UL (ref 4.31–10.16)

## 2020-04-23 PROCEDURE — 86038 ANTINUCLEAR ANTIBODIES: CPT

## 2020-04-23 PROCEDURE — 80053 COMPREHEN METABOLIC PANEL: CPT

## 2020-04-23 PROCEDURE — 86140 C-REACTIVE PROTEIN: CPT

## 2020-04-23 PROCEDURE — 85025 COMPLETE CBC W/AUTO DIFF WBC: CPT

## 2020-04-23 PROCEDURE — 85652 RBC SED RATE AUTOMATED: CPT

## 2020-04-23 PROCEDURE — 36415 COLL VENOUS BLD VENIPUNCTURE: CPT

## 2020-04-23 PROCEDURE — 84550 ASSAY OF BLOOD/URIC ACID: CPT

## 2020-04-23 PROCEDURE — 86430 RHEUMATOID FACTOR TEST QUAL: CPT

## 2020-04-24 LAB
RHEUMATOID FACT SER QL LA: NEGATIVE
RYE IGE QN: NEGATIVE

## 2020-04-27 DIAGNOSIS — E11.65 TYPE 2 DIABETES MELLITUS WITH HYPERGLYCEMIA, WITHOUT LONG-TERM CURRENT USE OF INSULIN (HCC): ICD-10-CM

## 2020-04-28 ENCOUNTER — TELEPHONE (OUTPATIENT)
Dept: FAMILY MEDICINE CLINIC | Facility: CLINIC | Age: 55
End: 2020-04-28

## 2020-04-28 DIAGNOSIS — E11.65 TYPE 2 DIABETES MELLITUS WITH HYPERGLYCEMIA, WITHOUT LONG-TERM CURRENT USE OF INSULIN (HCC): Primary | ICD-10-CM

## 2020-05-13 ENCOUNTER — APPOINTMENT (OUTPATIENT)
Dept: LAB | Facility: MEDICAL CENTER | Age: 55
End: 2020-05-13
Payer: COMMERCIAL

## 2020-05-13 ENCOUNTER — OFFICE VISIT (OUTPATIENT)
Dept: FAMILY MEDICINE CLINIC | Facility: CLINIC | Age: 55
End: 2020-05-13
Payer: COMMERCIAL

## 2020-05-13 VITALS
HEIGHT: 63 IN | SYSTOLIC BLOOD PRESSURE: 124 MMHG | WEIGHT: 168.4 LBS | BODY MASS INDEX: 29.84 KG/M2 | TEMPERATURE: 98 F | DIASTOLIC BLOOD PRESSURE: 86 MMHG

## 2020-05-13 DIAGNOSIS — R10.9 LEFT FLANK PAIN: Primary | ICD-10-CM

## 2020-05-13 DIAGNOSIS — Z72.0 TOBACCO USE: ICD-10-CM

## 2020-05-13 DIAGNOSIS — E11.65 TYPE 2 DIABETES MELLITUS WITH HYPERGLYCEMIA, WITHOUT LONG-TERM CURRENT USE OF INSULIN (HCC): ICD-10-CM

## 2020-05-13 DIAGNOSIS — E78.00 HYPERCHOLESTEROLEMIA: ICD-10-CM

## 2020-05-13 LAB
BACTERIA UR QL AUTO: NORMAL /HPF
BILIRUB UR QL STRIP: NEGATIVE
CHOLEST SERPL-MCNC: 155 MG/DL (ref 50–200)
CLARITY UR: CLEAR
COLOR UR: YELLOW
CREAT UR-MCNC: 86.1 MG/DL
GLUCOSE UR STRIP-MCNC: ABNORMAL MG/DL
HDLC SERPL-MCNC: 54 MG/DL
HGB UR QL STRIP.AUTO: NEGATIVE
HYALINE CASTS #/AREA URNS LPF: NORMAL /LPF
KETONES UR STRIP-MCNC: ABNORMAL MG/DL
LDLC SERPL CALC-MCNC: 68 MG/DL (ref 0–100)
LEUKOCYTE ESTERASE UR QL STRIP: ABNORMAL
MICROALBUMIN UR-MCNC: <5 MG/L (ref 0–20)
MICROALBUMIN/CREAT 24H UR: <6 MG/G CREATININE (ref 0–30)
NITRITE UR QL STRIP: NEGATIVE
NON-SQ EPI CELLS URNS QL MICRO: NORMAL /HPF
PH UR STRIP.AUTO: 6 [PH]
PROT UR STRIP-MCNC: NEGATIVE MG/DL
RBC #/AREA URNS AUTO: NORMAL /HPF
SL AMB POCT HEMOGLOBIN AIC: 7.2 (ref ?–6.5)
SP GR UR STRIP.AUTO: 1.04 (ref 1–1.03)
TRIGL SERPL-MCNC: 165 MG/DL
TSH SERPL DL<=0.05 MIU/L-ACNC: 1.58 UIU/ML (ref 0.36–3.74)
UROBILINOGEN UR QL STRIP.AUTO: 0.2 E.U./DL
WBC #/AREA URNS AUTO: NORMAL /HPF

## 2020-05-13 PROCEDURE — 87086 URINE CULTURE/COLONY COUNT: CPT | Performed by: FAMILY MEDICINE

## 2020-05-13 PROCEDURE — 80061 LIPID PANEL: CPT | Performed by: FAMILY MEDICINE

## 2020-05-13 PROCEDURE — 99214 OFFICE O/P EST MOD 30 MIN: CPT | Performed by: FAMILY MEDICINE

## 2020-05-13 PROCEDURE — 36415 COLL VENOUS BLD VENIPUNCTURE: CPT | Performed by: FAMILY MEDICINE

## 2020-05-13 PROCEDURE — 2022F DILAT RTA XM EVC RTNOPTHY: CPT | Performed by: FAMILY MEDICINE

## 2020-05-13 PROCEDURE — 84443 ASSAY THYROID STIM HORMONE: CPT | Performed by: FAMILY MEDICINE

## 2020-05-13 PROCEDURE — 3079F DIAST BP 80-89 MM HG: CPT | Performed by: FAMILY MEDICINE

## 2020-05-13 PROCEDURE — 82570 ASSAY OF URINE CREATININE: CPT | Performed by: FAMILY MEDICINE

## 2020-05-13 PROCEDURE — 81001 URINALYSIS AUTO W/SCOPE: CPT | Performed by: FAMILY MEDICINE

## 2020-05-13 PROCEDURE — 3008F BODY MASS INDEX DOCD: CPT | Performed by: FAMILY MEDICINE

## 2020-05-13 PROCEDURE — 4004F PT TOBACCO SCREEN RCVD TLK: CPT | Performed by: FAMILY MEDICINE

## 2020-05-13 PROCEDURE — 82043 UR ALBUMIN QUANTITATIVE: CPT | Performed by: FAMILY MEDICINE

## 2020-05-13 PROCEDURE — 3051F HG A1C>EQUAL 7.0%<8.0%: CPT | Performed by: FAMILY MEDICINE

## 2020-05-13 PROCEDURE — 83036 HEMOGLOBIN GLYCOSYLATED A1C: CPT | Performed by: FAMILY MEDICINE

## 2020-05-13 PROCEDURE — 3074F SYST BP LT 130 MM HG: CPT | Performed by: FAMILY MEDICINE

## 2020-05-13 RX ORDER — CIPROFLOXACIN 500 MG/1
500 TABLET, FILM COATED ORAL EVERY 12 HOURS SCHEDULED
Qty: 14 TABLET | Refills: 0 | Status: SHIPPED | OUTPATIENT
Start: 2020-05-13 | End: 2020-05-20

## 2020-05-14 LAB — BACTERIA UR CULT: NORMAL

## 2020-05-18 ENCOUNTER — TELEPHONE (OUTPATIENT)
Dept: FAMILY MEDICINE CLINIC | Facility: CLINIC | Age: 55
End: 2020-05-18

## 2020-05-18 DIAGNOSIS — R10.9 LEFT FLANK PAIN: Primary | ICD-10-CM

## 2020-05-18 DIAGNOSIS — R30.0 DYSURIA: ICD-10-CM

## 2020-05-21 ENCOUNTER — TELEMEDICINE (OUTPATIENT)
Dept: UROLOGY | Facility: MEDICAL CENTER | Age: 55
End: 2020-05-21
Payer: COMMERCIAL

## 2020-05-21 DIAGNOSIS — R10.9 LEFT FLANK PAIN: ICD-10-CM

## 2020-05-21 DIAGNOSIS — N39.41 URGE INCONTINENCE: Primary | ICD-10-CM

## 2020-05-21 DIAGNOSIS — R30.0 DYSURIA: ICD-10-CM

## 2020-05-21 PROCEDURE — G2012 BRIEF CHECK IN BY MD/QHP: HCPCS | Performed by: UROLOGY

## 2020-05-21 RX ORDER — OXYBUTYNIN CHLORIDE 5 MG/1
5 TABLET, EXTENDED RELEASE ORAL DAILY
Qty: 30 TABLET | Refills: 11 | Status: SHIPPED | OUTPATIENT
Start: 2020-05-21 | End: 2021-08-02

## 2020-05-28 ENCOUNTER — TELEPHONE (OUTPATIENT)
Dept: FAMILY MEDICINE CLINIC | Facility: CLINIC | Age: 55
End: 2020-05-28

## 2020-06-08 ENCOUNTER — HOSPITAL ENCOUNTER (OUTPATIENT)
Dept: ULTRASOUND IMAGING | Facility: HOSPITAL | Age: 55
Discharge: HOME | End: 2020-06-08
Attending: UROLOGY
Payer: COMMERCIAL

## 2020-06-08 DIAGNOSIS — R10.9 LEFT FLANK PAIN: ICD-10-CM

## 2020-06-08 PROCEDURE — 76770 US EXAM ABDO BACK WALL COMP: CPT

## 2020-06-10 ENCOUNTER — TELEPHONE (OUTPATIENT)
Dept: FAMILY MEDICINE CLINIC | Facility: CLINIC | Age: 55
End: 2020-06-10

## 2020-06-10 DIAGNOSIS — K29.00 ACUTE GASTRITIS WITHOUT HEMORRHAGE, UNSPECIFIED GASTRITIS TYPE: ICD-10-CM

## 2020-06-10 RX ORDER — FAMOTIDINE 40 MG/1
40 TABLET, FILM COATED ORAL DAILY
Qty: 90 TABLET | Refills: 3 | Status: SHIPPED | OUTPATIENT
Start: 2020-06-10 | End: 2021-05-17 | Stop reason: SDUPTHER

## 2020-06-22 ENCOUNTER — TELEMEDICINE (OUTPATIENT)
Dept: UROLOGY | Facility: CLINIC | Age: 55
End: 2020-06-22
Payer: COMMERCIAL

## 2020-06-22 ENCOUNTER — TELEPHONE (OUTPATIENT)
Dept: UROLOGY | Facility: MEDICAL CENTER | Age: 55
End: 2020-06-22

## 2020-06-22 DIAGNOSIS — N39.41 URGE INCONTINENCE: Primary | ICD-10-CM

## 2020-06-22 PROCEDURE — G2012 BRIEF CHECK IN BY MD/QHP: HCPCS | Performed by: UROLOGY

## 2020-06-22 RX ORDER — OXYBUTYNIN CHLORIDE 10 MG/1
10 TABLET, EXTENDED RELEASE ORAL
Qty: 30 TABLET | Refills: 11 | Status: SHIPPED | OUTPATIENT
Start: 2020-06-22 | End: 2021-08-02 | Stop reason: SDUPTHER

## 2020-06-30 ENCOUNTER — TELEPHONE (OUTPATIENT)
Dept: HEMATOLOGY ONCOLOGY | Facility: MEDICAL CENTER | Age: 55
End: 2020-06-30

## 2020-06-30 DIAGNOSIS — Z29.8 NEED FOR PROPHYLAXIS AGAINST URINARY TRACT INFECTION: ICD-10-CM

## 2020-06-30 RX ORDER — NITROFURANTOIN MACROCRYSTALS 50 MG/1
50 CAPSULE ORAL DAILY
Qty: 30 CAPSULE | Refills: 1 | Status: SHIPPED | OUTPATIENT
Start: 2020-06-30 | End: 2020-08-21 | Stop reason: SDUPTHER

## 2020-08-19 DIAGNOSIS — E11.9 TYPE 2 DIABETES MELLITUS WITHOUT COMPLICATION, WITHOUT LONG-TERM CURRENT USE OF INSULIN (HCC): ICD-10-CM

## 2020-08-19 DIAGNOSIS — E78.2 MIXED HYPERLIPIDEMIA: ICD-10-CM

## 2020-08-19 RX ORDER — SIMVASTATIN 20 MG
20 TABLET ORAL
Qty: 30 TABLET | Refills: 3 | Status: SHIPPED | OUTPATIENT
Start: 2020-08-19 | End: 2020-12-08 | Stop reason: SDUPTHER

## 2020-08-21 ENCOUNTER — TELEPHONE (OUTPATIENT)
Dept: HEMATOLOGY ONCOLOGY | Facility: CLINIC | Age: 55
End: 2020-08-21

## 2020-08-21 DIAGNOSIS — Z29.8 NEED FOR PROPHYLAXIS AGAINST URINARY TRACT INFECTION: ICD-10-CM

## 2020-08-21 RX ORDER — NITROFURANTOIN MACROCRYSTALS 50 MG/1
50 CAPSULE ORAL DAILY
Qty: 30 CAPSULE | Refills: 1 | Status: SHIPPED | OUTPATIENT
Start: 2020-08-21 | End: 2020-10-02 | Stop reason: SDUPTHER

## 2020-08-21 NOTE — TELEPHONE ENCOUNTER
I spoke with pt and let her know that she needs to hold her aspirin 5 days prior to her dentist procedure and she may restart the following day  She voiced understanding

## 2020-08-24 DIAGNOSIS — E11.65 TYPE 2 DIABETES MELLITUS WITH HYPERGLYCEMIA, WITHOUT LONG-TERM CURRENT USE OF INSULIN (HCC): ICD-10-CM

## 2020-08-24 RX ORDER — DULAGLUTIDE 0.75 MG/.5ML
0.75 INJECTION, SOLUTION SUBCUTANEOUS WEEKLY
Qty: 4 PEN | Refills: 3 | Status: SHIPPED | OUTPATIENT
Start: 2020-08-24 | End: 2020-12-08 | Stop reason: SDUPTHER

## 2020-08-27 ENCOUNTER — TELEPHONE (OUTPATIENT)
Dept: FAMILY MEDICINE CLINIC | Facility: CLINIC | Age: 55
End: 2020-08-27

## 2020-10-02 ENCOUNTER — TELEPHONE (OUTPATIENT)
Dept: HEMATOLOGY ONCOLOGY | Facility: CLINIC | Age: 55
End: 2020-10-02

## 2020-10-02 ENCOUNTER — TELEPHONE (OUTPATIENT)
Dept: UROLOGY | Facility: AMBULATORY SURGERY CENTER | Age: 55
End: 2020-10-02

## 2020-10-02 DIAGNOSIS — I73.9 PERIPHERAL VASCULAR DISEASE, UNSPECIFIED (HCC): Primary | ICD-10-CM

## 2020-10-02 DIAGNOSIS — R30.0 DYSURIA: Primary | ICD-10-CM

## 2020-10-02 DIAGNOSIS — Z29.8 NEED FOR PROPHYLAXIS AGAINST URINARY TRACT INFECTION: ICD-10-CM

## 2020-10-02 RX ORDER — NITROFURANTOIN MACROCRYSTALS 50 MG/1
50 CAPSULE ORAL DAILY
Qty: 30 CAPSULE | Refills: 1 | Status: SHIPPED | OUTPATIENT
Start: 2020-10-02 | End: 2020-12-08 | Stop reason: SDUPTHER

## 2020-10-17 DIAGNOSIS — E11.65 TYPE 2 DIABETES MELLITUS WITH HYPERGLYCEMIA, WITHOUT LONG-TERM CURRENT USE OF INSULIN (HCC): ICD-10-CM

## 2020-10-19 DIAGNOSIS — E11.65 TYPE 2 DIABETES MELLITUS WITH HYPERGLYCEMIA, WITHOUT LONG-TERM CURRENT USE OF INSULIN (HCC): ICD-10-CM

## 2020-10-19 RX ORDER — EMPAGLIFLOZIN 10 MG/1
10 TABLET, FILM COATED ORAL EVERY MORNING
Qty: 30 TABLET | Refills: 3 | Status: SHIPPED | OUTPATIENT
Start: 2020-10-19 | End: 2021-02-15 | Stop reason: SDUPTHER

## 2020-10-19 RX ORDER — EMPAGLIFLOZIN 10 MG/1
TABLET, FILM COATED ORAL
Qty: 30 TABLET | Refills: 3 | Status: SHIPPED | OUTPATIENT
Start: 2020-10-19 | End: 2020-10-19 | Stop reason: SDUPTHER

## 2020-10-21 ENCOUNTER — TELEPHONE (OUTPATIENT)
Dept: HEMATOLOGY ONCOLOGY | Facility: CLINIC | Age: 55
End: 2020-10-21

## 2020-10-28 ENCOUNTER — TELEPHONE (OUTPATIENT)
Dept: HEMATOLOGY ONCOLOGY | Facility: CLINIC | Age: 55
End: 2020-10-28

## 2020-10-28 DIAGNOSIS — D45 POLYCYTHEMIA VERA (HCC): ICD-10-CM

## 2020-10-28 RX ORDER — HYDROXYUREA 500 MG/1
CAPSULE ORAL
Qty: 45 CAPSULE | Refills: 5 | Status: SHIPPED | OUTPATIENT
Start: 2020-10-28 | End: 2021-04-19 | Stop reason: SDUPTHER

## 2020-11-27 ENCOUNTER — HOSPITAL ENCOUNTER (OUTPATIENT)
Dept: NON INVASIVE DIAGNOSTICS | Facility: HOSPITAL | Age: 55
Discharge: HOME | End: 2020-11-27
Attending: SURGERY
Payer: COMMERCIAL

## 2020-11-27 ENCOUNTER — OFFICE VISIT (OUTPATIENT)
Dept: HEMATOLOGY ONCOLOGY | Facility: CLINIC | Age: 55
End: 2020-11-27
Payer: COMMERCIAL

## 2020-11-27 VITALS
SYSTOLIC BLOOD PRESSURE: 133 MMHG | HEIGHT: 63 IN | TEMPERATURE: 97.5 F | BODY MASS INDEX: 29.95 KG/M2 | DIASTOLIC BLOOD PRESSURE: 73 MMHG | RESPIRATION RATE: 17 BRPM | WEIGHT: 169 LBS | HEART RATE: 84 BPM | OXYGEN SATURATION: 97 %

## 2020-11-27 DIAGNOSIS — I73.9 PERIPHERAL VASCULAR DISEASE, UNSPECIFIED (HCC): ICD-10-CM

## 2020-11-27 DIAGNOSIS — R53.83 FATIGUE, UNSPECIFIED TYPE: ICD-10-CM

## 2020-11-27 DIAGNOSIS — D45 POLYCYTHEMIA VERA (HCC): Primary | ICD-10-CM

## 2020-11-27 PROCEDURE — 93923 UPR/LXTR ART STDY 3+ LVLS: CPT

## 2020-11-27 PROCEDURE — 93925 LOWER EXTREMITY STUDY: CPT | Performed by: SURGERY

## 2020-11-27 PROCEDURE — 93925 LOWER EXTREMITY STUDY: CPT

## 2020-11-27 PROCEDURE — 99213 OFFICE O/P EST LOW 20 MIN: CPT | Performed by: NURSE PRACTITIONER

## 2020-11-27 PROCEDURE — 93922 UPR/L XTREMITY ART 2 LEVELS: CPT | Performed by: SURGERY

## 2020-11-27 PROCEDURE — 3008F BODY MASS INDEX DOCD: CPT | Performed by: NURSE PRACTITIONER

## 2020-11-27 PROCEDURE — 3075F SYST BP GE 130 - 139MM HG: CPT | Performed by: NURSE PRACTITIONER

## 2020-11-27 PROCEDURE — 3078F DIAST BP <80 MM HG: CPT | Performed by: NURSE PRACTITIONER

## 2020-12-01 ENCOUNTER — TELEPHONE (OUTPATIENT)
Dept: VASCULAR SURGERY | Facility: CLINIC | Age: 55
End: 2020-12-01

## 2020-12-02 ENCOUNTER — TELEMEDICINE (OUTPATIENT)
Dept: VASCULAR SURGERY | Facility: HOSPITAL | Age: 55
End: 2020-12-02
Payer: COMMERCIAL

## 2020-12-02 VITALS
HEART RATE: 76 BPM | WEIGHT: 170 LBS | SYSTOLIC BLOOD PRESSURE: 133 MMHG | DIASTOLIC BLOOD PRESSURE: 76 MMHG | BODY MASS INDEX: 29.02 KG/M2 | HEIGHT: 64 IN

## 2020-12-02 DIAGNOSIS — I74.09 AORTOILIAC OCCLUSIVE DISEASE (HCC): Primary | ICD-10-CM

## 2020-12-02 DIAGNOSIS — E78.00 HYPERCHOLESTEROLEMIA: ICD-10-CM

## 2020-12-02 DIAGNOSIS — I10 ESSENTIAL HYPERTENSION: ICD-10-CM

## 2020-12-02 PROBLEM — I70.229 ATHEROSCLEROSIS OF ARTERY OF EXTREMITY WITH REST PAIN (HCC): Status: RESOLVED | Noted: 2019-06-14 | Resolved: 2020-12-02

## 2020-12-02 PROCEDURE — 3075F SYST BP GE 130 - 139MM HG: CPT | Performed by: PHYSICIAN ASSISTANT

## 2020-12-02 PROCEDURE — 4004F PT TOBACCO SCREEN RCVD TLK: CPT | Performed by: PHYSICIAN ASSISTANT

## 2020-12-02 PROCEDURE — 3008F BODY MASS INDEX DOCD: CPT | Performed by: PHYSICIAN ASSISTANT

## 2020-12-02 PROCEDURE — 3078F DIAST BP <80 MM HG: CPT | Performed by: PHYSICIAN ASSISTANT

## 2020-12-02 PROCEDURE — 99211 OFF/OP EST MAY X REQ PHY/QHP: CPT | Performed by: PHYSICIAN ASSISTANT

## 2020-12-08 ENCOUNTER — TELEPHONE (OUTPATIENT)
Dept: HEMATOLOGY ONCOLOGY | Facility: CLINIC | Age: 55
End: 2020-12-08

## 2020-12-08 DIAGNOSIS — E11.9 TYPE 2 DIABETES MELLITUS WITHOUT COMPLICATION, WITHOUT LONG-TERM CURRENT USE OF INSULIN (HCC): ICD-10-CM

## 2020-12-08 DIAGNOSIS — J32.9 RECURRENT SINUSITIS: Primary | ICD-10-CM

## 2020-12-08 DIAGNOSIS — Z29.8 NEED FOR PROPHYLAXIS AGAINST URINARY TRACT INFECTION: ICD-10-CM

## 2020-12-08 DIAGNOSIS — E11.65 TYPE 2 DIABETES MELLITUS WITH HYPERGLYCEMIA, WITHOUT LONG-TERM CURRENT USE OF INSULIN (HCC): ICD-10-CM

## 2020-12-08 DIAGNOSIS — E78.2 MIXED HYPERLIPIDEMIA: ICD-10-CM

## 2020-12-08 RX ORDER — NITROFURANTOIN MACROCRYSTALS 50 MG/1
50 CAPSULE ORAL DAILY
Qty: 30 CAPSULE | Refills: 0 | Status: SHIPPED | OUTPATIENT
Start: 2020-12-08 | End: 2021-01-07 | Stop reason: SDUPTHER

## 2020-12-08 RX ORDER — AMOXICILLIN AND CLAVULANATE POTASSIUM 875; 125 MG/1; MG/1
1 TABLET, FILM COATED ORAL EVERY 12 HOURS SCHEDULED
Qty: 14 TABLET | Refills: 0 | Status: SHIPPED | OUTPATIENT
Start: 2020-12-08 | End: 2020-12-15

## 2020-12-08 RX ORDER — DULAGLUTIDE 0.75 MG/.5ML
0.75 INJECTION, SOLUTION SUBCUTANEOUS WEEKLY
Qty: 4 PEN | Refills: 3 | Status: SHIPPED | OUTPATIENT
Start: 2020-12-08 | End: 2021-04-05 | Stop reason: SDUPTHER

## 2020-12-08 RX ORDER — SIMVASTATIN 20 MG
20 TABLET ORAL
Qty: 30 TABLET | Refills: 3 | Status: SHIPPED | OUTPATIENT
Start: 2020-12-08 | End: 2021-04-19 | Stop reason: SDUPTHER

## 2020-12-28 ENCOUNTER — LAB (OUTPATIENT)
Dept: LAB | Facility: MEDICAL CENTER | Age: 55
End: 2020-12-28
Payer: COMMERCIAL

## 2020-12-28 ENCOUNTER — PROCEDURE VISIT (OUTPATIENT)
Dept: UROLOGY | Facility: CLINIC | Age: 55
End: 2020-12-28
Payer: COMMERCIAL

## 2020-12-28 VITALS
BODY MASS INDEX: 29.18 KG/M2 | HEIGHT: 64 IN | HEART RATE: 78 BPM | DIASTOLIC BLOOD PRESSURE: 70 MMHG | SYSTOLIC BLOOD PRESSURE: 140 MMHG

## 2020-12-28 DIAGNOSIS — N36.9 URETHRAL DISORDER: ICD-10-CM

## 2020-12-28 DIAGNOSIS — N89.8 VAGINAL MASS: ICD-10-CM

## 2020-12-28 DIAGNOSIS — N81.11 MIDLINE CYSTOCELE: ICD-10-CM

## 2020-12-28 DIAGNOSIS — R10.2 PELVIC PAIN: ICD-10-CM

## 2020-12-28 DIAGNOSIS — R10.2 PELVIC PAIN: Primary | ICD-10-CM

## 2020-12-28 DIAGNOSIS — R53.83 FATIGUE, UNSPECIFIED TYPE: ICD-10-CM

## 2020-12-28 LAB
ALBUMIN SERPL BCP-MCNC: 3.4 G/DL (ref 3.5–5)
ALP SERPL-CCNC: 97 U/L (ref 46–116)
ALT SERPL W P-5'-P-CCNC: 21 U/L (ref 12–78)
ANION GAP SERPL CALCULATED.3IONS-SCNC: 5 MMOL/L (ref 4–13)
AST SERPL W P-5'-P-CCNC: 11 U/L (ref 5–45)
BASOPHILS # BLD AUTO: 0.12 THOUSANDS/ΜL (ref 0–0.1)
BASOPHILS NFR BLD AUTO: 1 % (ref 0–1)
BILIRUB SERPL-MCNC: 0.47 MG/DL (ref 0.2–1)
BUN SERPL-MCNC: 16 MG/DL (ref 5–25)
CALCIUM ALBUM COR SERPL-MCNC: 9.8 MG/DL (ref 8.3–10.1)
CALCIUM SERPL-MCNC: 9.3 MG/DL (ref 8.3–10.1)
CHLORIDE SERPL-SCNC: 111 MMOL/L (ref 100–108)
CO2 SERPL-SCNC: 27 MMOL/L (ref 21–32)
CREAT SERPL-MCNC: 0.78 MG/DL (ref 0.6–1.3)
EOSINOPHIL # BLD AUTO: 0.19 THOUSAND/ΜL (ref 0–0.61)
EOSINOPHIL NFR BLD AUTO: 2 % (ref 0–6)
ERYTHROCYTE [DISTWIDTH] IN BLOOD BY AUTOMATED COUNT: 13.1 % (ref 11.6–15.1)
FERRITIN SERPL-MCNC: 90 NG/ML (ref 8–388)
GFR SERPL CREATININE-BSD FRML MDRD: 86 ML/MIN/1.73SQ M
GLUCOSE P FAST SERPL-MCNC: 72 MG/DL (ref 65–99)
HCT VFR BLD AUTO: 44.2 % (ref 34.8–46.1)
HGB BLD-MCNC: 14.7 G/DL (ref 11.5–15.4)
IMM GRANULOCYTES # BLD AUTO: 0.06 THOUSAND/UL (ref 0–0.2)
IMM GRANULOCYTES NFR BLD AUTO: 1 % (ref 0–2)
IRON SATN MFR SERPL: 22 %
IRON SERPL-MCNC: 62 UG/DL (ref 50–170)
LYMPHOCYTES # BLD AUTO: 4.3 THOUSANDS/ΜL (ref 0.6–4.47)
LYMPHOCYTES NFR BLD AUTO: 35 % (ref 14–44)
MCH RBC QN AUTO: 37.4 PG (ref 26.8–34.3)
MCHC RBC AUTO-ENTMCNC: 33.3 G/DL (ref 31.4–37.4)
MCV RBC AUTO: 113 FL (ref 82–98)
MONOCYTES # BLD AUTO: 0.81 THOUSAND/ΜL (ref 0.17–1.22)
MONOCYTES NFR BLD AUTO: 7 % (ref 4–12)
NEUTROPHILS # BLD AUTO: 6.82 THOUSANDS/ΜL (ref 1.85–7.62)
NEUTS SEG NFR BLD AUTO: 54 % (ref 43–75)
NRBC BLD AUTO-RTO: 0 /100 WBCS
PHOSPHATE SERPL-MCNC: 4.7 MG/DL (ref 2.7–4.5)
PLATELET # BLD AUTO: 299 THOUSANDS/UL (ref 149–390)
PMV BLD AUTO: 9.3 FL (ref 8.9–12.7)
POTASSIUM SERPL-SCNC: 4.2 MMOL/L (ref 3.5–5.3)
PROT SERPL-MCNC: 7.2 G/DL (ref 6.4–8.2)
RBC # BLD AUTO: 3.93 MILLION/UL (ref 3.81–5.12)
SL AMB  POCT GLUCOSE, UA: 2000
SL AMB LEUKOCYTE ESTERASE,UA: NORMAL
SL AMB POCT BILIRUBIN,UA: NORMAL
SL AMB POCT BLOOD,UA: NORMAL
SL AMB POCT CLARITY,UA: CLEAR
SL AMB POCT COLOR,UA: YELLOW
SL AMB POCT KETONES,UA: NORMAL
SL AMB POCT NITRITE,UA: NORMAL
SL AMB POCT PH,UA: 5
SL AMB POCT SPECIFIC GRAVITY,UA: 1.01
SL AMB POCT URINE PROTEIN: NORMAL
SL AMB POCT UROBILINOGEN: 0.2
SODIUM SERPL-SCNC: 143 MMOL/L (ref 136–145)
TIBC SERPL-MCNC: 288 UG/DL (ref 250–450)
WBC # BLD AUTO: 12.3 THOUSAND/UL (ref 4.31–10.16)

## 2020-12-28 PROCEDURE — 99214 OFFICE O/P EST MOD 30 MIN: CPT | Performed by: UROLOGY

## 2020-12-28 PROCEDURE — 81002 URINALYSIS NONAUTO W/O SCOPE: CPT | Performed by: UROLOGY

## 2020-12-28 PROCEDURE — 52000 CYSTOURETHROSCOPY: CPT | Performed by: UROLOGY

## 2020-12-28 PROCEDURE — 83540 ASSAY OF IRON: CPT

## 2020-12-28 PROCEDURE — 82728 ASSAY OF FERRITIN: CPT

## 2020-12-28 PROCEDURE — 36415 COLL VENOUS BLD VENIPUNCTURE: CPT | Performed by: NURSE PRACTITIONER

## 2020-12-28 PROCEDURE — 84100 ASSAY OF PHOSPHORUS: CPT

## 2020-12-28 PROCEDURE — 3077F SYST BP >= 140 MM HG: CPT | Performed by: UROLOGY

## 2020-12-28 PROCEDURE — 80053 COMPREHEN METABOLIC PANEL: CPT | Performed by: NURSE PRACTITIONER

## 2020-12-28 PROCEDURE — 85025 COMPLETE CBC W/AUTO DIFF WBC: CPT | Performed by: NURSE PRACTITIONER

## 2020-12-28 PROCEDURE — 3078F DIAST BP <80 MM HG: CPT | Performed by: UROLOGY

## 2020-12-28 PROCEDURE — 4004F PT TOBACCO SCREEN RCVD TLK: CPT | Performed by: UROLOGY

## 2020-12-28 PROCEDURE — 83550 IRON BINDING TEST: CPT

## 2020-12-28 RX ORDER — CIPROFLOXACIN 500 MG/1
500 TABLET, FILM COATED ORAL ONCE
Qty: 1 TABLET | Refills: 0 | Status: SHIPPED | OUTPATIENT
Start: 2020-12-28 | End: 2020-12-28

## 2021-01-05 ENCOUNTER — HOSPITAL ENCOUNTER (OUTPATIENT)
Dept: NON INVASIVE DIAGNOSTICS | Facility: HOSPITAL | Age: 56
Discharge: HOME | End: 2021-01-05
Payer: COMMERCIAL

## 2021-01-05 ENCOUNTER — HOSPITAL ENCOUNTER (OUTPATIENT)
Dept: MRI IMAGING | Facility: HOSPITAL | Age: 56
Discharge: HOME | End: 2021-01-05
Attending: UROLOGY
Payer: COMMERCIAL

## 2021-01-05 DIAGNOSIS — N36.9 URETHRAL DISORDER: ICD-10-CM

## 2021-01-05 DIAGNOSIS — R10.2 PELVIC PAIN: ICD-10-CM

## 2021-01-05 DIAGNOSIS — I74.09 AORTOILIAC OCCLUSIVE DISEASE (HCC): ICD-10-CM

## 2021-01-05 PROCEDURE — 93978 VASCULAR STUDY: CPT

## 2021-01-05 PROCEDURE — G1004 CDSM NDSC: HCPCS

## 2021-01-05 PROCEDURE — 72197 MRI PELVIS W/O & W/DYE: CPT

## 2021-01-05 PROCEDURE — A9585 GADOBUTROL INJECTION: HCPCS | Performed by: UROLOGY

## 2021-01-05 PROCEDURE — 93923 UPR/LXTR ART STDY 3+ LVLS: CPT

## 2021-01-05 RX ADMIN — GADOBUTROL 7 ML: 604.72 INJECTION INTRAVENOUS at 15:26

## 2021-01-06 PROCEDURE — 93978 VASCULAR STUDY: CPT | Performed by: SURGERY

## 2021-01-07 ENCOUNTER — TELEPHONE (OUTPATIENT)
Dept: HEMATOLOGY ONCOLOGY | Facility: CLINIC | Age: 56
End: 2021-01-07

## 2021-01-07 DIAGNOSIS — Z29.8 NEED FOR PROPHYLAXIS AGAINST URINARY TRACT INFECTION: ICD-10-CM

## 2021-01-07 RX ORDER — NITROFURANTOIN MACROCRYSTALS 50 MG/1
50 CAPSULE ORAL DAILY
Qty: 30 CAPSULE | Refills: 0 | Status: SHIPPED | OUTPATIENT
Start: 2021-01-07 | End: 2021-02-15 | Stop reason: SDUPTHER

## 2021-01-07 NOTE — TELEPHONE ENCOUNTER
daMedication Refill     Who is Calling  Patient    Medication  nitrofurantoin        How many pills left 0   Preferred Pharmacy  [facility]    Call back number  [phone]   Relevant Information

## 2021-01-08 ENCOUNTER — TELEPHONE (OUTPATIENT)
Dept: VASCULAR SURGERY | Facility: CLINIC | Age: 56
End: 2021-01-08

## 2021-01-08 NOTE — TELEPHONE ENCOUNTER
COVID Pre-Visit Screening     1  Is this a family member screening? No  2  Have you traveled outside of your state in the past 2 weeks? No  3  Do you presently have a fever or flu-like symptoms? No  4  Do you have symptoms of an upper respiratory infection like runny nose, sore throat, or cough? No  5  Are you suffering from new headache that you have not had in the past?  No  6  Do you have/have you experienced any new shortness of breath recently? No  7  Do you have any new diarrhea, nausea or vomiting? No  8  Have you been in contact with anyone who has been sick or diagnosed with COVID-19? No  9  Do you have any new loss of taste or smell? No  10  Are you able to wear a mask without a valve for the entire visit? Yes      Patient confirmed appointment time, date and location  Patient completed COVID screening

## 2021-01-11 ENCOUNTER — TELEPHONE (OUTPATIENT)
Dept: UROLOGY | Facility: CLINIC | Age: 56
End: 2021-01-11

## 2021-01-11 ENCOUNTER — TELEMEDICINE (OUTPATIENT)
Dept: VASCULAR SURGERY | Facility: HOSPITAL | Age: 56
End: 2021-01-11
Payer: COMMERCIAL

## 2021-01-11 VITALS — HEIGHT: 63 IN | BODY MASS INDEX: 29.23 KG/M2 | WEIGHT: 165 LBS | TEMPERATURE: 98.6 F

## 2021-01-11 DIAGNOSIS — I74.09 AORTOILIAC OCCLUSIVE DISEASE (HCC): Primary | ICD-10-CM

## 2021-01-11 PROCEDURE — G2012 BRIEF CHECK IN BY MD/QHP: HCPCS | Performed by: SURGERY

## 2021-01-11 PROCEDURE — 3008F BODY MASS INDEX DOCD: CPT | Performed by: UROLOGY

## 2021-01-11 RX ORDER — LEVOFLOXACIN 5 MG/ML
750 INJECTION, SOLUTION INTRAVENOUS ONCE
Status: CANCELLED | OUTPATIENT
Start: 2021-01-11 | End: 2021-01-11

## 2021-01-11 NOTE — ASSESSMENT & PLAN NOTE
History of left common iliac balloon angioplasty stenting; right common iliac balloon angioplasty in June of 2019 for disabling claudication/rest pain  Most recent surveillance duplex suggest elevated velocities of the left common iliac stent with GLENN 0 88 previously 1 0  Patient denies any symptoms  We discussed options of continued surveillance versus repeat aortogram with balloon angioplasty of InStent stenosis  She reports she was recently diagnosed with a urethral mass and is currently undergoing workup  I will obtain a repeat 6 month duplex  If in the interim she experiences any recurrent left lower extremity rest pain/disabling claudication she will notify the office

## 2021-01-11 NOTE — TELEPHONE ENCOUNTER
I called patient and went over her MRI results with her  I have discussed these with the radiologist as well  I personally reviewed the images  She has an enhancing lesion where I felt the mass that was periurethral, that I thought might be a diverticulum or something else  It is solid  I told her I wish to do a biopsy of this in the operating room where would take a small section of it and then simply over sew it  She asked why not take it all out now and I told her I wanted to know exactly what I am dealing with before we try something like that, because of it is a malignancy we may  need to do a major operation  We will set this up  I will see her and do H&P and consent on day of admission

## 2021-01-15 ENCOUNTER — TELEPHONE (OUTPATIENT)
Dept: UROLOGY | Facility: MEDICAL CENTER | Age: 56
End: 2021-01-15

## 2021-01-15 NOTE — TELEPHONE ENCOUNTER
I spoke to patient and scheduled her surgery for 2/17 at [facility] with Dr Radha Davies  I am going to mail her a surgery packet  She will have pats prior to surgery

## 2021-02-15 ENCOUNTER — TELEPHONE (OUTPATIENT)
Dept: UROLOGY | Facility: AMBULATORY SURGERY CENTER | Age: 56
End: 2021-02-15

## 2021-02-15 ENCOUNTER — TELEPHONE (OUTPATIENT)
Dept: HEMATOLOGY ONCOLOGY | Facility: CLINIC | Age: 56
End: 2021-02-15

## 2021-02-15 DIAGNOSIS — E11.9 TYPE 2 DIABETES MELLITUS WITHOUT COMPLICATION, WITHOUT LONG-TERM CURRENT USE OF INSULIN (HCC): ICD-10-CM

## 2021-02-15 DIAGNOSIS — E11.65 TYPE 2 DIABETES MELLITUS WITH HYPERGLYCEMIA, WITHOUT LONG-TERM CURRENT USE OF INSULIN (HCC): ICD-10-CM

## 2021-02-15 DIAGNOSIS — J32.9 RECURRENT SINUSITIS: Primary | ICD-10-CM

## 2021-02-15 DIAGNOSIS — Z29.8 NEED FOR PROPHYLAXIS AGAINST URINARY TRACT INFECTION: Primary | ICD-10-CM

## 2021-02-15 RX ORDER — LOSARTAN POTASSIUM 25 MG/1
25 TABLET ORAL DAILY
Qty: 90 TABLET | Refills: 3 | Status: SHIPPED | OUTPATIENT
Start: 2021-02-15 | End: 2021-02-22 | Stop reason: SDUPTHER

## 2021-02-15 RX ORDER — EMPAGLIFLOZIN 10 MG/1
10 TABLET, FILM COATED ORAL EVERY MORNING
Qty: 90 TABLET | Refills: 3 | Status: SHIPPED | OUTPATIENT
Start: 2021-02-15 | End: 2021-02-22 | Stop reason: SDUPTHER

## 2021-02-15 RX ORDER — NITROFURANTOIN MACROCRYSTALS 50 MG/1
50 CAPSULE ORAL DAILY
Qty: 30 CAPSULE | Refills: 0 | Status: SHIPPED | OUTPATIENT
Start: 2021-02-15 | End: 2021-03-15 | Stop reason: SDUPTHER

## 2021-02-15 RX ORDER — AZITHROMYCIN 250 MG/1
TABLET, FILM COATED ORAL
Qty: 6 TABLET | Refills: 0 | Status: SHIPPED | OUTPATIENT
Start: 2021-02-15 | End: 2021-02-20

## 2021-02-15 NOTE — TELEPHONE ENCOUNTER
Frank Tirado,    This patient called the office stating that she is currently sick (sinus infection) and would like to postpone her upcoming surgery with Dr Ricci Ronquillo  Please call the patient to discuss  Thank you!

## 2021-02-15 NOTE — TELEPHONE ENCOUNTER
I spoke to patient and rescheduled her surgery to 3/17 with Dr Juve Wilhelm in [facility]  I am mailing her an updated surgery packet

## 2021-02-15 NOTE — TELEPHONE ENCOUNTER
daMedication Refill     Who is Calling  Patient   Medication  nitrofurantoin (MACRODANTIN) 50 mg capsule       How many pills left 5   Preferred Pharmacy [facility]    Call back number [phone]   Relevant Information

## 2021-02-15 NOTE — TELEPHONE ENCOUNTER
Will send to RN to confirm with Dr Estrella Quinones ok to fill  Can the patient have refills if ok to fill

## 2021-02-16 NOTE — TELEPHONE ENCOUNTER
Patient needs a 2 week post op bx results appt with Dr Hilary Chang at [facility]

## 2021-02-16 NOTE — TELEPHONE ENCOUNTER
Called and spoke with patient  Patient scheduled 04/15/21 at 230pm in the [facility] office for a post op visit  Appointment card mailed to patient

## 2021-02-22 DIAGNOSIS — E11.9 TYPE 2 DIABETES MELLITUS WITHOUT COMPLICATION, WITHOUT LONG-TERM CURRENT USE OF INSULIN (HCC): ICD-10-CM

## 2021-02-22 DIAGNOSIS — E11.65 TYPE 2 DIABETES MELLITUS WITH HYPERGLYCEMIA, WITHOUT LONG-TERM CURRENT USE OF INSULIN (HCC): ICD-10-CM

## 2021-02-22 RX ORDER — EMPAGLIFLOZIN 10 MG/1
10 TABLET, FILM COATED ORAL EVERY MORNING
Qty: 90 TABLET | Refills: 3 | Status: SHIPPED | OUTPATIENT
Start: 2021-02-22 | End: 2022-02-21 | Stop reason: SDUPTHER

## 2021-02-22 RX ORDER — LOSARTAN POTASSIUM 25 MG/1
25 TABLET ORAL DAILY
Qty: 90 TABLET | Refills: 3 | Status: SHIPPED | OUTPATIENT
Start: 2021-02-22 | End: 2022-04-28 | Stop reason: SDUPTHER

## 2021-03-01 ENCOUNTER — TELEPHONE (OUTPATIENT)
Dept: FAMILY MEDICINE CLINIC | Facility: CLINIC | Age: 56
End: 2021-03-01

## 2021-03-01 DIAGNOSIS — L98.9 FOOT LESION: Primary | ICD-10-CM

## 2021-03-01 NOTE — TELEPHONE ENCOUNTER
C/O LUMP ON OUTSIDE OF HEEL AREA  IT LOOKS LIKE A  WHITISH TYPE HEAD, HARD, RED, AND PAINFUL  CAN YO MAKE REFERRAL TO DR ELLIS FOR HER  SHE DOES NOT LIKE DR Azeem Martinez

## 2021-03-08 NOTE — PRE-PROCEDURE INSTRUCTIONS
Pre-Surgery Instructions:   Medication Instructions    aspirin (ECOTRIN LOW STRENGTH) 81 mg EC tablet Patient was instructed by Physician and understands   Dulaglutide (Trulicity) 6 55 DM/8 2KO SOPN Instructed patient per Anesthesia Guidelines   Empagliflozin (Jardiance) 10 MG TABS Instructed patient per Anesthesia Guidelines   famotidine (PEPCID) 40 MG tablet Instructed patient per Anesthesia Guidelines   fluticasone (FLONASE) 50 mcg/act nasal spray Instructed patient per Anesthesia Guidelines   hydroxyurea (HYDREA) 500 mg capsule Instructed patient per Anesthesia Guidelines   losartan (COZAAR) 25 mg tablet Instructed patient per Anesthesia Guidelines   meloxicam (MOBIC) 7 5 mg tablet Instructed patient per Anesthesia Guidelines   nitrofurantoin (MACRODANTIN) 50 mg capsule Instructed patient per Anesthesia Guidelines   ondansetron (ZOFRAN-ODT) 4 mg disintegrating tablet Instructed patient per Anesthesia Guidelines   oxybutynin (DITROPAN-XL) 10 MG 24 hr tablet Instructed patient per Anesthesia Guidelines   simvastatin (ZOCOR) 20 mg tablet Instructed patient per Anesthesia Guidelines   sitaGLIPtin (Januvia) 100 mg tablet Instructed patient per Anesthesia Guidelines  Education Index    Med Instructions Troubleshoot   5HT3 Antagonists Med Class    Continue to take this medication on your normal schedule  If this is an oral medication and you take it in the morning, then you may take this medicine with a sip of water  ACE/ARB Med Class    Continue this medication up to the evening before surgery/procedure, but do not take the morning of the day of surgery  ASA Med Class: Aspirin    Should be discontinued at least one week prior to planned operation, unless specifically stated otherwise by surgical service  Your Surgeon may have patient stop taking aspirin up to a week before surgery if having intracranial, middle ear, posterior eye, spine surgery or prostate surgery  [Patients taking aspirin for coronary stents should be reviewed by an anesthesiologist in the optimization clinic  Please do not discontinue aspirin in patients with coronary stents unless given specific permission to do so by the cardiologist who prescribed medication ]   If your surgeon approves please continue to take this medication on your normal schedule  You may take this medication on the morning of your surgery with a sip of water  H2 Blocker Med Class    Continue to take this medication on your normal schedule  If this is an oral medication and you take it in the morning, then you may take this medicine with a sip of water  Insulin Med Class    Pre-Surgery/Procedure Instructions for Adult Patients who Take Medicine for Diabetes or to Control their Blood Sugar     Day Before Surgery/Procedure  Use the directions based on the type of medicine you take for your diabetes  1  If you are having a procedure that does not require a bowel prep:  ? Pre-Mixed Insulin (Intermediate Acting: Humalog 75/25, Humulin 70/30  Novolog 70/30, Regular Insulin)  § Take ½ your regular dose the evening before your procedure  ? Rapid/Fast Acting Insulin/Long Acting Insulin (Humalog U200, NovoLog, Apidra, Lantus, Levemir, Darliss Dial, [place])  § Take your FULL regular dose the day before procedure  ? Oral Diabetic Medicines including Glipizide/Glimepiride/Glucotrol (sulfonylurea)  § Take your regular dose with dinner the evening before your procedure  2  If you are having a procedure (e g  Colonoscopy) that requires a bowel prep and you are allowed to have at least a clear liquid diet:  ? Pre-Mixed Insulin (Intermediate Acting: Humalog 75/25, Humulin 70/30, Novolog 70/30, Regular Insulin)  § Take ½ your regular dose the evening before your procedure  ? Rapid/Fast Acting Insulin (Humalog U200, NovoLog, Apidra, Fiasp)  § Take ½ your regular dose the evening before your procedure  ?  Long Acting Insulin (Lantus, Levemir, Josee Abo)  § Take your FULL regular dose the day before procedure  ? Oral Glipizide/Glimepiride/Glucotrol (sulfonylurea)  § Take ½ your regular dose the evening before your procedure  ? Oral Diabetic Medicines that are NOT Glipizide/Glimepiride/Glucotrol  § Take your regular dose with dinner in the evening before your procedure      Day of Surgery/Procedure  · Long Acting Insulin (Lantus, Levemir, Josee Abo)  ? If you usually take your Long-Acting Insulin in the morning, take the full dose as scheduled  · With the exception of the morning Long-Acting Insulin noted above, DO NOT take ANY diabetic medicine on the day of your procedure unless you were instructed by the doctor who manages your diabetic medicines  · Continue to check your blood sugars  · If you have an insulin pump then consult with your Endocrinologist for instructions  · If you cannot see your Endocrinologist, on the day of the procedure set your insulin pump to your basal rate only  Please bring your insulin pump supplies to the hospital      This Educational material has been approved by the Patient Education Advisory Committee  Date prepared: 1/17/2018          Expiration date: 1/17/2019        Approval Number:                     NSAID Med Class    Stop taking this medication at least 3 days prior to surgery/procedure  Statin Med Class    Continue to take this medication on your normal schedule  If this is an oral medication and you take it in the morning, then you may take this medicine with a sip of water  Urinary antispasmodics Med Class    Continue this medication up to the evening before surgery/procedure, but do not take the morning of the day of surgery  Pt was instructed to stop aspirin one week before surgery by Dr Alina Pinto  Pt will not be taking any medications the DOS

## 2021-03-08 NOTE — PRE-PROCEDURE INSTRUCTIONS
Pre-Surgery Instructions:   Medication Instructions    aspirin (ECOTRIN LOW STRENGTH) 81 mg EC tablet Patient was instructed by Physician and understands   Dulaglutide (Trulicity) 3 35 JE/0 5BR SOPN Instructed patient per Anesthesia Guidelines   Empagliflozin (Jardiance) 10 MG TABS Instructed patient per Anesthesia Guidelines   famotidine (PEPCID) 40 MG tablet Instructed patient per Anesthesia Guidelines   fluticasone (FLONASE) 50 mcg/act nasal spray Instructed patient per Anesthesia Guidelines   hydroxyurea (HYDREA) 500 mg capsule Instructed patient per Anesthesia Guidelines   losartan (COZAAR) 25 mg tablet Instructed patient per Anesthesia Guidelines   meloxicam (MOBIC) 7 5 mg tablet Instructed patient per Anesthesia Guidelines   nitrofurantoin (MACRODANTIN) 50 mg capsule Instructed patient per Anesthesia Guidelines   ondansetron (ZOFRAN-ODT) 4 mg disintegrating tablet Instructed patient per Anesthesia Guidelines   oxybutynin (DITROPAN-XL) 10 MG 24 hr tablet Instructed patient per Anesthesia Guidelines   simvastatin (ZOCOR) 20 mg tablet Instructed patient per Anesthesia Guidelines   sitaGLIPtin (Januvia) 100 mg tablet Instructed patient per Anesthesia Guidelines  Education Index    Med Instructions Troubleshoot   5HT3 Antagonists Med Class    Continue to take this medication on your normal schedule  If this is an oral medication and you take it in the morning, then you may take this medicine with a sip of water  ACE/ARB Med Class    Continue this medication up to the evening before surgery/procedure, but do not take the morning of the day of surgery  ASA Med Class: Aspirin    Should be discontinued at least one week prior to planned operation, unless specifically stated otherwise by surgical service  Your Surgeon may have patient stop taking aspirin up to a week before surgery if having intracranial, middle ear, posterior eye, spine surgery or prostate surgery  [Patients taking aspirin for coronary stents should be reviewed by an anesthesiologist in the optimization clinic  Please do not discontinue aspirin in patients with coronary stents unless given specific permission to do so by the cardiologist who prescribed medication ]   If your surgeon approves please continue to take this medication on your normal schedule  You may take this medication on the morning of your surgery with a sip of water  H2 Blocker Med Class    Continue to take this medication on your normal schedule  If this is an oral medication and you take it in the morning, then you may take this medicine with a sip of water  Insulin Med Class    Pre-Surgery/Procedure Instructions for Adult Patients who Take Medicine for Diabetes or to Control their Blood Sugar     Day Before Surgery/Procedure  Use the directions based on the type of medicine you take for your diabetes  1  If you are having a procedure that does not require a bowel prep:  ? Pre-Mixed Insulin (Intermediate Acting: Humalog 75/25, Humulin 70/30  Novolog 70/30, Regular Insulin)  § Take ½ your regular dose the evening before your procedure  ? Rapid/Fast Acting Insulin/Long Acting Insulin (Humalog U200, NovoLog, Apidra, Lantus, Levemir, Miguel Tovar)  § Take your FULL regular dose the day before procedure  ? Oral Diabetic Medicines including Glipizide/Glimepiride/Glucotrol (sulfonylurea)  § Take your regular dose with dinner the evening before your procedure  2  If you are having a procedure (e g  Colonoscopy) that requires a bowel prep and you are allowed to have at least a clear liquid diet:  ? Pre-Mixed Insulin (Intermediate Acting: Humalog 75/25, Humulin 70/30, Novolog 70/30, Regular Insulin)  § Take ½ your regular dose the evening before your procedure  ? Rapid/Fast Acting Insulin (Humalog U200, NovoLog, Apidra, Fiasp)  § Take ½ your regular dose the evening before your procedure  ?  Long Acting Insulin (Lantus, Levemir, Carola David)  § Take your FULL regular dose the day before procedure  ? Oral Glipizide/Glimepiride/Glucotrol (sulfonylurea)  § Take ½ your regular dose the evening before your procedure  ? Oral Diabetic Medicines that are NOT Glipizide/Glimepiride/Glucotrol  § Take your regular dose with dinner in the evening before your procedure      Day of Surgery/Procedure  · Long Acting Insulin (Lantus, Levemir, Carola David)  ? If you usually take your Long-Acting Insulin in the morning, take the full dose as scheduled  · With the exception of the morning Long-Acting Insulin noted above, DO NOT take ANY diabetic medicine on the day of your procedure unless you were instructed by the doctor who manages your diabetic medicines  · Continue to check your blood sugars  · If you have an insulin pump then consult with your Endocrinologist for instructions  · If you cannot see your Endocrinologist, on the day of the procedure set your insulin pump to your basal rate only  Please bring your insulin pump supplies to the hospital      This Educational material has been approved by the Patient Education Advisory Committee  Date prepared: 1/17/2018          Expiration date: 1/17/2019        Approval Number:                     NSAID Med Class    Stop taking this medication at least 3 days prior to surgery/procedure  Statin Med Class    Continue to take this medication on your normal schedule  If this is an oral medication and you take it in the morning, then you may take this medicine with a sip of water  Urinary antispasmodics Med Class    Continue this medication up to the evening before surgery/procedure, but do not take the morning of the day of surgery  Pt was instructed to stop aspirin one week before surgery  Pt will stop Mobic on 3/14 for surgery on 3/17  Pt instructed to not take any diabetic meds the DOS

## 2021-03-12 ENCOUNTER — HOSPITAL ENCOUNTER (OUTPATIENT)
Dept: NON INVASIVE DIAGNOSTICS | Facility: HOSPITAL | Age: 56
Discharge: HOME | End: 2021-03-12
Attending: UROLOGY
Payer: COMMERCIAL

## 2021-03-12 ENCOUNTER — APPOINTMENT (OUTPATIENT)
Dept: LAB | Facility: HOSPITAL | Age: 56
End: 2021-03-12
Attending: UROLOGY
Payer: COMMERCIAL

## 2021-03-12 DIAGNOSIS — N36.9 URETHRAL DISORDER: ICD-10-CM

## 2021-03-12 LAB
EST. AVERAGE GLUCOSE BLD GHB EST-MCNC: 140 MG/DL
HBA1C MFR BLD: 6.5 %

## 2021-03-12 PROCEDURE — 93005 ELECTROCARDIOGRAM TRACING: CPT

## 2021-03-12 PROCEDURE — 87086 URINE CULTURE/COLONY COUNT: CPT

## 2021-03-12 PROCEDURE — 83036 HEMOGLOBIN GLYCOSYLATED A1C: CPT

## 2021-03-14 LAB — BACTERIA UR CULT: NORMAL

## 2021-03-15 DIAGNOSIS — Z29.8 NEED FOR PROPHYLAXIS AGAINST URINARY TRACT INFECTION: ICD-10-CM

## 2021-03-15 LAB
ATRIAL RATE: 90 BPM
P AXIS: 70 DEGREES
PR INTERVAL: 140 MS
QRS AXIS: 71 DEGREES
QRSD INTERVAL: 86 MS
QT INTERVAL: 350 MS
QTC INTERVAL: 428 MS
T WAVE AXIS: 48 DEGREES
VENTRICULAR RATE: 90 BPM

## 2021-03-15 PROCEDURE — 93010 ELECTROCARDIOGRAM REPORT: CPT | Performed by: INTERNAL MEDICINE

## 2021-03-16 DIAGNOSIS — Z29.8 NEED FOR PROPHYLAXIS AGAINST URINARY TRACT INFECTION: Primary | ICD-10-CM

## 2021-03-16 RX ORDER — NITROFURANTOIN MACROCRYSTALS 50 MG/1
50 CAPSULE ORAL DAILY
Qty: 30 CAPSULE | Refills: 0 | Status: SHIPPED | OUTPATIENT
Start: 2021-03-16 | End: 2021-04-19 | Stop reason: SDUPTHER

## 2021-03-16 RX ORDER — NITROFURANTOIN MACROCRYSTALS 50 MG/1
50 CAPSULE ORAL DAILY
Qty: 30 CAPSULE | Refills: 3 | Status: SHIPPED | OUTPATIENT
Start: 2021-03-16 | End: 2022-06-07 | Stop reason: SDUPTHER

## 2021-03-17 ENCOUNTER — ANESTHESIA (OUTPATIENT)
Dept: PERIOP | Facility: HOSPITAL | Age: 56
End: 2021-03-17
Payer: COMMERCIAL

## 2021-03-17 ENCOUNTER — HOSPITAL ENCOUNTER (OUTPATIENT)
Facility: HOSPITAL | Age: 56
Setting detail: OUTPATIENT SURGERY
Discharge: HOME | End: 2021-03-17
Attending: UROLOGY | Admitting: UROLOGY
Payer: COMMERCIAL

## 2021-03-17 ENCOUNTER — ANESTHESIA EVENT (OUTPATIENT)
Dept: PERIOP | Facility: HOSPITAL | Age: 56
End: 2021-03-17
Payer: COMMERCIAL

## 2021-03-17 VITALS
DIASTOLIC BLOOD PRESSURE: 55 MMHG | RESPIRATION RATE: 14 BRPM | HEART RATE: 83 BPM | SYSTOLIC BLOOD PRESSURE: 117 MMHG | TEMPERATURE: 97.8 F | OXYGEN SATURATION: 97 % | HEIGHT: 63 IN | BODY MASS INDEX: 29.06 KG/M2 | WEIGHT: 164 LBS

## 2021-03-17 DIAGNOSIS — N36.9 URETHRAL DISORDER: ICD-10-CM

## 2021-03-17 DIAGNOSIS — N89.8 VAGINAL MASS: ICD-10-CM

## 2021-03-17 LAB — GLUCOSE SERPL-MCNC: 145 MG/DL (ref 65–140)

## 2021-03-17 PROCEDURE — 88341 IMHCHEM/IMCYTCHM EA ADD ANTB: CPT | Performed by: PATHOLOGY

## 2021-03-17 PROCEDURE — 53200 BIOPSY OF URETHRA: CPT | Performed by: UROLOGY

## 2021-03-17 PROCEDURE — 82948 REAGENT STRIP/BLOOD GLUCOSE: CPT

## 2021-03-17 PROCEDURE — 88342 IMHCHEM/IMCYTCHM 1ST ANTB: CPT | Performed by: PATHOLOGY

## 2021-03-17 PROCEDURE — 88305 TISSUE EXAM BY PATHOLOGIST: CPT | Performed by: PATHOLOGY

## 2021-03-17 PROCEDURE — NC001 PR NO CHARGE: Performed by: UROLOGY

## 2021-03-17 RX ORDER — HYDROMORPHONE HCL/PF 1 MG/ML
0.4 SYRINGE (ML) INJECTION
Status: DISCONTINUED | OUTPATIENT
Start: 2021-03-17 | End: 2021-03-17 | Stop reason: HOSPADM

## 2021-03-17 RX ORDER — HYDROCODONE BITARTRATE AND ACETAMINOPHEN 5; 325 MG/1; MG/1
1 TABLET ORAL EVERY 6 HOURS PRN
Status: DISCONTINUED | OUTPATIENT
Start: 2021-03-17 | End: 2021-03-17 | Stop reason: HOSPADM

## 2021-03-17 RX ORDER — FENTANYL CITRATE/PF 50 MCG/ML
25 SYRINGE (ML) INJECTION
Status: DISCONTINUED | OUTPATIENT
Start: 2021-03-17 | End: 2021-03-17 | Stop reason: HOSPADM

## 2021-03-17 RX ORDER — HYDROCODONE BITARTRATE AND ACETAMINOPHEN 5; 325 MG/1; MG/1
1-2 TABLET ORAL EVERY 4 HOURS PRN
Qty: 20 TABLET | Refills: 0 | Status: SHIPPED | OUTPATIENT
Start: 2021-03-17 | End: 2021-03-17

## 2021-03-17 RX ORDER — PROPOFOL 10 MG/ML
INJECTION, EMULSION INTRAVENOUS CONTINUOUS PRN
Status: DISCONTINUED | OUTPATIENT
Start: 2021-03-17 | End: 2021-03-17

## 2021-03-17 RX ORDER — HYDROCODONE BITARTRATE AND ACETAMINOPHEN 5; 325 MG/1; MG/1
1 TABLET ORAL EVERY 6 HOURS PRN
Qty: 20 TABLET | Refills: 0 | Status: SHIPPED | OUTPATIENT
Start: 2021-03-17 | End: 2021-03-27

## 2021-03-17 RX ORDER — FENTANYL CITRATE 50 UG/ML
INJECTION, SOLUTION INTRAMUSCULAR; INTRAVENOUS AS NEEDED
Status: DISCONTINUED | OUTPATIENT
Start: 2021-03-17 | End: 2021-03-17

## 2021-03-17 RX ORDER — LIDOCAINE HYDROCHLORIDE 10 MG/ML
INJECTION, SOLUTION EPIDURAL; INFILTRATION; INTRACAUDAL; PERINEURAL AS NEEDED
Status: DISCONTINUED | OUTPATIENT
Start: 2021-03-17 | End: 2021-03-17

## 2021-03-17 RX ORDER — ONDANSETRON 2 MG/ML
INJECTION INTRAMUSCULAR; INTRAVENOUS AS NEEDED
Status: DISCONTINUED | OUTPATIENT
Start: 2021-03-17 | End: 2021-03-17

## 2021-03-17 RX ORDER — PROPOFOL 10 MG/ML
INJECTION, EMULSION INTRAVENOUS AS NEEDED
Status: DISCONTINUED | OUTPATIENT
Start: 2021-03-17 | End: 2021-03-17

## 2021-03-17 RX ORDER — ONDANSETRON 2 MG/ML
4 INJECTION INTRAMUSCULAR; INTRAVENOUS ONCE AS NEEDED
Status: DISCONTINUED | OUTPATIENT
Start: 2021-03-17 | End: 2021-03-17 | Stop reason: HOSPADM

## 2021-03-17 RX ORDER — SCOLOPAMINE TRANSDERMAL SYSTEM 1 MG/1
PATCH, EXTENDED RELEASE TRANSDERMAL AS NEEDED
Status: DISCONTINUED | OUTPATIENT
Start: 2021-03-17 | End: 2021-03-17

## 2021-03-17 RX ORDER — SCOLOPAMINE TRANSDERMAL SYSTEM 1 MG/1
PATCH, EXTENDED RELEASE TRANSDERMAL
Status: COMPLETED
Start: 2021-03-17 | End: 2021-03-17

## 2021-03-17 RX ORDER — SODIUM CHLORIDE, SODIUM LACTATE, POTASSIUM CHLORIDE, CALCIUM CHLORIDE 600; 310; 30; 20 MG/100ML; MG/100ML; MG/100ML; MG/100ML
INJECTION, SOLUTION INTRAVENOUS CONTINUOUS PRN
Status: DISCONTINUED | OUTPATIENT
Start: 2021-03-17 | End: 2021-03-17

## 2021-03-17 RX ORDER — DEXAMETHASONE SODIUM PHOSPHATE 10 MG/ML
INJECTION, SOLUTION INTRAMUSCULAR; INTRAVENOUS AS NEEDED
Status: DISCONTINUED | OUTPATIENT
Start: 2021-03-17 | End: 2021-03-17

## 2021-03-17 RX ORDER — LEVOFLOXACIN 5 MG/ML
750 INJECTION, SOLUTION INTRAVENOUS ONCE
Status: COMPLETED | OUTPATIENT
Start: 2021-03-17 | End: 2021-03-17

## 2021-03-17 RX ORDER — LIDOCAINE HYDROCHLORIDE AND EPINEPHRINE 10; 10 MG/ML; UG/ML
INJECTION, SOLUTION INFILTRATION; PERINEURAL AS NEEDED
Status: DISCONTINUED | OUTPATIENT
Start: 2021-03-17 | End: 2021-03-17 | Stop reason: HOSPADM

## 2021-03-17 RX ORDER — METOCLOPRAMIDE HYDROCHLORIDE 5 MG/ML
10 INJECTION INTRAMUSCULAR; INTRAVENOUS ONCE AS NEEDED
Status: DISCONTINUED | OUTPATIENT
Start: 2021-03-17 | End: 2021-03-17 | Stop reason: HOSPADM

## 2021-03-17 RX ORDER — HYDROCODONE BITARTRATE AND ACETAMINOPHEN 5; 325 MG/1; MG/1
1 TABLET ORAL EVERY 6 HOURS PRN
Qty: 20 TABLET | Refills: 0 | Status: SHIPPED | OUTPATIENT
Start: 2021-03-17 | End: 2022-05-26

## 2021-03-17 RX ORDER — LEVOFLOXACIN 500 MG/1
500 TABLET, FILM COATED ORAL EVERY 24 HOURS
Qty: 3 TABLET | Refills: 0 | Status: SHIPPED | OUTPATIENT
Start: 2021-03-17 | End: 2021-03-20

## 2021-03-17 RX ADMIN — FENTANYL CITRATE 50 MCG: 50 INJECTION, SOLUTION INTRAMUSCULAR; INTRAVENOUS at 16:30

## 2021-03-17 RX ADMIN — LIDOCAINE HYDROCHLORIDE 50 MG: 10 INJECTION, SOLUTION EPIDURAL; INFILTRATION; INTRACAUDAL; PERINEURAL at 16:22

## 2021-03-17 RX ADMIN — SCOPALAMINE 1 PATCH: 1 PATCH, EXTENDED RELEASE TRANSDERMAL at 16:25

## 2021-03-17 RX ADMIN — PROPOFOL 120 MCG/KG/MIN: 10 INJECTION, EMULSION INTRAVENOUS at 16:22

## 2021-03-17 RX ADMIN — DEXAMETHASONE SODIUM PHOSPHATE 4 MG: 10 INJECTION, SOLUTION INTRAMUSCULAR; INTRAVENOUS at 16:31

## 2021-03-17 RX ADMIN — ONDANSETRON 4 MG: 2 INJECTION INTRAMUSCULAR; INTRAVENOUS at 16:31

## 2021-03-17 RX ADMIN — PROPOFOL 200 MG: 10 INJECTION, EMULSION INTRAVENOUS at 16:22

## 2021-03-17 RX ADMIN — LEVOFLOXACIN: 5 INJECTION, SOLUTION INTRAVENOUS at 16:25

## 2021-03-17 RX ADMIN — SODIUM CHLORIDE, SODIUM LACTATE, POTASSIUM CHLORIDE, AND CALCIUM CHLORIDE: .6; .31; .03; .02 INJECTION, SOLUTION INTRAVENOUS at 16:20

## 2021-03-17 RX ADMIN — FENTANYL CITRATE 50 MCG: 50 INJECTION, SOLUTION INTRAMUSCULAR; INTRAVENOUS at 16:26

## 2021-03-17 NOTE — OP NOTE
OPERATIVE REPORT  PATIENT NAME: Adamaris Diane    :  1965  MRN: [id number]  Pt Location: [facility] OR ROOM 02    SURGERY DATE: 3/17/2021    Surgeon(s) and Role:     Rebecca Torres MD - Primary    Preop Diagnosis:  Urethral disorder [N36 9]  Vaginal mass [N89 8]    Post-Op Diagnosis Codes:     * Urethral disorder [N36 9]     * Vaginal mass [N89 8]    Procedure(s) (LRB):  Open transvaginal urethral biopsy (N/A)    Specimen(s):  ID Type Source Tests Collected by Time Destination   1 : TRANSVAGINAL urethral biopsy Tissue Urethra TISSUE EXAM Javier Raphael MD 3/17/2021 1635        Estimated Blood Loss:   Minimal    Drains:  [REMOVED] Urethral Catheter Non-latex 16 Fr  (Removed)   Number of days: 0       Anesthesia Type:   General/LMA    Operative Indications:  Urethral disorder [N36 9]  Vaginal mass [N89 8]  Urethral mass    Operative Findings:  Solid mass anterior urethra under vaginal mucosa  Wedge resection done for biopsy  Complications:   None    Procedure and Technique:  54-year-old woman with a smooth sub vaginal mucosal mass over the urethra  She is status post sling in the past   MRI shows an enhancing lesion so I recommended biopsy of this  Given the size of it I think it would be best to perform wedge biopsy and make determination for further treatment based on the results of the biopsy  The risks of bleeding infection and damage to adjacent organs were explained she gives informed consent  The patient was brought to the operating room identified properly  LMA was induced, the patient was prepped and draped dorsal lithotomy position usual fashion  A time-out was performed  I used wheatlander retractors to gain exposure and I placed a 16 Hungarian De Souza catheter drained the bladder  The area the vaginal mucosa over the mass was anesthetized with 1% xylocaine with epinephrine to reduce bleeding  About 3-4 cc were used    I then took a 15 blade and made a longitudinal incision in an elliptical  shape to excise about 0 5-1 cm of tissue  This was sent to pathology  There was very little bleeding  The tissue looked like a tumor  I then closed the wound with a running interlocking 2-0 Vicryl suture  Hemostasis was excellent in all sponge needle counts were correct at the end of procedure  The wound was irrigated  The De Souza catheter was removed at the end the procedure     I was present for the entire procedure and A qualified resident physician was not available    Patient Disposition:  PACU  and extubated and stable    SIGNATURE: David Guerra MD  DATE: March 17, 2021  TIME: 4:42 PM

## 2021-03-17 NOTE — H&P
H&P Exam - Urology   [facility] 1965 [id number]      Assessment/Plan     Assessment:  Urethral mass, solid  Plan:  Open transvaginal biopsy of urethral mass  History of Present Illness   HPI:  The patient presents for open transvaginal biopsy of urethral mass  This discovered by me on physical exam during pelvic exam, an MRI showed an enhancing thickening of the proximal urethral wall  I have advised biopsy to make sure there is no malignancy    The risks of bleeding, infection, damage to the urethra and need for additional procedures has been explained and informed consent given  She is status post pubovaginal sling in the past     PMH/PSH reviewed    Review of systems:    General: No fever  CVS: No chest pain or new SOB  Abdomen: No diarrhea or blood in stool  : No new voiding difficulties  Neuro: No syncope/weakness/loss of sensation/paresis  Ophthalmologic: No new visual changes  Ortho: No new back/joint pains  Social History- as per previous notes  Family History: non-contributory    Meds/Allergies   PTA meds:   Prior to Admission Medications   Prescriptions Last Dose Informant Patient Reported? Taking? Dulaglutide (Trulicity) 2 53 ZF/4 7FP SOPN  Self No Yes   Sig: Inject 0 5 mL (0 75 mg total) under the skin once a week   Empagliflozin (Jardiance) 10 MG TABS   No Yes   Sig: Take 1 tablet (10 mg total) by mouth every morning   aspirin (ECOTRIN LOW STRENGTH) 81 mg EC tablet  Self Yes Yes   Sig: Take 81 mg by mouth daily   famotidine (PEPCID) 40 MG tablet  Self No Yes   Sig: Take 1 tablet (40 mg total) by mouth daily   fluticasone (FLONASE) 50 mcg/act nasal spray  Self No Yes   Si sprays into each nostril daily   hydroxyurea (HYDREA) 500 mg capsule  Self No Yes   Sig: Take 1 capsule on odd numbered days and 2 capsules on even number days     losartan (COZAAR) 25 mg tablet   No Yes   Sig: Take 1 tablet (25 mg total) by mouth daily   meloxicam (MOBIC) 7 5 mg tablet  Self No Yes Sig: Take 1 tablet (7 5 mg total) by mouth daily   nitrofurantoin (MACRODANTIN) 50 mg capsule   No No   Sig: Take 1 capsule (50 mg total) by mouth daily   nitrofurantoin (MACRODANTIN) 50 mg capsule   No No   Sig: Take 1 capsule (50 mg total) by mouth daily   ondansetron (ZOFRAN-ODT) 4 mg disintegrating tablet  Self No Yes   Sig: Take 1 tablet (4 mg total) by mouth every 6 (six) hours as needed for nausea or vomiting   oxybutynin (DITROPAN-XL) 10 MG 24 hr tablet  Self No Yes   Sig: Take 1 tablet (10 mg total) by mouth daily at bedtime   oxybutynin (DITROPAN-XL) 5 mg 24 hr tablet  Self No No   Sig: Take 1 tablet (5 mg total) by mouth daily   Patient not taking: Reported on 12/28/2020   simvastatin (ZOCOR) 20 mg tablet  Self No Yes   Sig: Take 1 tablet (20 mg total) by mouth daily at bedtime   sitaGLIPtin (Januvia) 100 mg tablet  Self No Yes   Sig: Take 1 tablet (100 mg total) by mouth daily      Facility-Administered Medications: None         Objective   Vitals: Ht 5' 3" (1 6 m)   Wt 74 4 kg (164 lb)   BMI 29 05 kg/m²     No intake/output data recorded  Physical Exam:    Awake, alert, in NAD  HEENT: Atraumatic, Normocephalic    Lungs: Normal Respiratory effort, no wheezes,stridor or rales  Abdomen: Nondistended  Extremiites: Normal ROM, no cyanosis/edema  Lab Results: I have personally reviewed pertinent reports  CBC: No results found for: WBC, HGB, HCT, MCV, PLT, ADJUSTEDWBC, MCH, MCHC, RDW, MPV, NRBC  CMP: No results found for: SODIUM, CL, CO2, ANIONGAP, BUN, CREATININE, GLUCOSE, CALCIUM, AST, ALT, ALKPHOS, PROT, BILITOT, EGFR  Urinalysis: No results found for: Jose Roses, SPECGRAV, PHUR, LEUKOCYTESUR, NITRITE, PROTEINUA, GLUCOSEU, KETONESU, BILIRUBINUR, BLOODU  Urine Culture: No results found for: URINECX  PSA: No results found for: PSA  Imaging: I have personally reviewed pertinent reports     and I have personally reviewed pertinent films in PACS

## 2021-03-17 NOTE — DISCHARGE INSTRUCTIONS
Expect to see vaginal bleeding  There are stitches in there that will dissolve  You may need to wear a pad  Call for fever greater than 101 5°, inability urinate or severe pain not relieved by pain medications  You may resume your normal activities including driving if not taking opiate pain medications  If taking opiate pain medications no driving or operating machinery  Take over-the-counter remedies to avoid constipation

## 2021-03-17 NOTE — ANESTHESIA PREPROCEDURE EVALUATION
Procedure:  Open transvaginal urethral biopsy (N/A Vagina )    ECG: NSR   Prior L common iliac stent   Hx of PONV    Denies the following: CP/SOB with exertion, asthma, COPD, BRIGITTE, stroke/TIA, seizure      Relevant Problems   CARDIO   (+) Aortoiliac occlusive disease (HCC)   (+) Chest pain with moderate risk for cardiac etiology   (+) Essential hypertension   (+) Hypercholesterolemia      ENDO   (+) Type 2 diabetes mellitus with hyperglycemia, without long-term current use of insulin (HCC)      GI/HEPATIC   (+) GI bleed   (+) Hepatic steatosis      Other   (+) Polycythemia vera (HCC)        Physical Exam    Airway    Mallampati score: II  TM Distance: >3 FB  Neck ROM: full     Dental       Cardiovascular      Pulmonary      Other Findings        Anesthesia Plan  ASA Score- 3     Anesthesia Type- general with ASA Monitors  Additional Monitors:   Airway Plan: LMA  Plan Factors-Exercise tolerance (METS): >4 METS  Chart reviewed  EKG reviewed  Existing labs reviewed  Patient summary reviewed  Patient is a current smoker  Obstructive sleep apnea risk education given perioperatively  Induction-     Postoperative Plan-     Informed Consent- Anesthetic plan and risks discussed with patient  I personally reviewed this patient with the CRNA  Discussed and agreed on the Anesthesia Plan with the CRNA  Jeanmarie Mcbride

## 2021-03-17 NOTE — ANESTHESIA POSTPROCEDURE EVALUATION
Post-Op Assessment Note    CV Status:  Stable    Pain management: adequate     Mental Status:  Awake   Hydration Status:  Stable   PONV Controlled:  Controlled   Airway Patency:  Patent      Post Op Vitals Reviewed: Yes      Staff: Anesthesiologist, CRNA         No complications documented      BP      Temp     Pulse     Resp      SpO2

## 2021-04-05 DIAGNOSIS — E11.65 TYPE 2 DIABETES MELLITUS WITH HYPERGLYCEMIA, WITHOUT LONG-TERM CURRENT USE OF INSULIN (HCC): ICD-10-CM

## 2021-04-05 RX ORDER — DULAGLUTIDE 0.75 MG/.5ML
0.75 INJECTION, SOLUTION SUBCUTANEOUS WEEKLY
Qty: 4 PEN | Refills: 3 | Status: SHIPPED | OUTPATIENT
Start: 2021-04-05 | End: 2021-07-27 | Stop reason: SDUPTHER

## 2021-04-15 ENCOUNTER — OFFICE VISIT (OUTPATIENT)
Dept: UROLOGY | Facility: CLINIC | Age: 56
End: 2021-04-15

## 2021-04-15 DIAGNOSIS — N89.8 VAGINAL MASS: Primary | ICD-10-CM

## 2021-04-15 PROCEDURE — 99024 POSTOP FOLLOW-UP VISIT: CPT | Performed by: UROLOGY

## 2021-04-15 NOTE — PROGRESS NOTES
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 elsewhere years ago - the blood vessel component explains the enhancement which was concerning  Re-examine in 6 months in the office  If it grows markedly in the meantime she will call me  Chief Complaint   No chief complaint on file  History of Present Illness     Status post open wedge biopsy of vaginal lesion overlying her urethra status post mid urethral sling placement  Final diagnosis of this is a fibrovascular and smooth muscle proliferation with prominent thin and thick walled medium-size blood vessels, no malignancy and appears to be an angiofibroma  I told the patient of the diagnosis and she was wondering if he could be removed  I advised against that due to the extensive nature of the lesion and the possibility of incontinence and vesicovaginal fistula  Also I told her that this is a form of a keloid and keloids can grow back even larger  Therefore we will monitor this by seeing her in 6 months for a repeat exam   I will base any determination for repeat MRI upon that exam   She did well after the surgery without complications  The following portions of the patient's history were reviewed and updated as appropriate: allergies, current medications, past family history, past medical history, past social history, past surgical history and problem list   Past Medical History:   Diagnosis Date    Arthritis     Cancer ([address] )      chronic leukemia    Chronic kidney disease     cyst in left kidney      Diabetes mellitus ([facility] [address] )     Frequent sinus infections     Pt states she has a mass in left side of sinus    GERD (gastroesophageal reflux disease)     History of echocardiogram 11/19/2008    Normal     History of nuclear stress test 11/19/2008    EF 66%, No evidence for ischemia      Hyperlipidemia     Hypertension     Missing tooth, acquired     Pt reports losing a tooth d/t chemotherapy    PONV (postoperative nausea and vomiting)     Tumor     Urethral    Wears glasses      Past Surgical History:   Procedure Laterality Date    APPENDECTOMY      BLADDER AUGMENTATION      CHOLECYSTECTOMY      COLONOSCOPY      CYSTOSCOPY  12/28/2020    HERNIA REPAIR Right     inguinal    HYSTERECTOMY      IR AORTAGRAM WITH RUN-OFF  6/14/2019    KNEE ARTHROPLASTY      KNEE ARTHROSCOPY Bilateral     RI COLONOSCOPY FLX DX W/COLLJ SPEC WHEN PFRMD N/A 1/23/2018    Procedure: COLONOSCOPY;  Surgeon: Maria Victoria Ruiz DO;  Location: [facility];  Service: Gastroenterology    RI Andre Smackover 3RD+ ORD SLCTV ABDL PEL/TR [facility] Left 6/14/2019    Procedure: ARTERIOGRAM-arteriography and possible endovascular intervention ;  Surgeon: Edie Childress MD;  Location: [facility];  Service: Vascular    PUBOVAGINAL SLING N/A 3/17/2021    Procedure: Open transvaginal urethral biopsy;  Surgeon: Lavelle Moreau MD;  Location:  MAIN OR;  Service: Urology    REDUCTION MAMMAPLASTY      SHOULDER SURGERY      WISDOM TOOTH EXTRACTION       shoulder  Review of Systems   Review of Systems    Active Problem List     Patient Active Problem List   Diagnosis    Polycythemia vera ([address] )    Type 2 diabetes mellitus with hyperglycemia, without long-term current use of insulin ([address] )    Acute cystitis without hematuria    Lactic acidosis    Elevated MCV    Hypercholesterolemia    Female bladder prolapse    Tobacco use    Leukocytosis    GI bleed    Hepatic steatosis    Essential hypertension    Aortoiliac occlusive disease ([address] )    Chest pain with moderate risk for cardiac etiology Objective   There were no vitals taken for this visit  Physical Exam        Current Medications     Current Outpatient Medications:     aspirin (ECOTRIN LOW STRENGTH) 81 mg EC tablet, Take 81 mg by mouth daily, Disp: , Rfl:     Dulaglutide (Trulicity) 3 75 NE/8 4XS SOPN, Inject 0 5 mL (0 75 mg total) under the skin once a week, Disp: 4 pen, Rfl: 3    Empagliflozin (Jardiance) 10 MG TABS, Take 1 tablet (10 mg total) by mouth every morning, Disp: 90 tablet, Rfl: 3    famotidine (PEPCID) 40 MG tablet, Take 1 tablet (40 mg total) by mouth daily, Disp: 90 tablet, Rfl: 3    fluticasone (FLONASE) 50 mcg/act nasal spray, 2 sprays into each nostril daily, Disp: 1 Bottle, Rfl: 5    HYDROcodone-acetaminophen (NORCO) 5-325 mg per tablet, Take 1 tablet by mouth every 6 (six) hours as needed for painMax Daily Amount: 4 tablets, Disp: 20 tablet, Rfl: 0    hydroxyurea (HYDREA) 500 mg capsule, Take 1 capsule on odd numbered days and 2 capsules on even number days  , Disp: 45 capsule, Rfl: 5    losartan (COZAAR) 25 mg tablet, Take 1 tablet (25 mg total) by mouth daily, Disp: 90 tablet, Rfl: 3    meloxicam (MOBIC) 7 5 mg tablet, Take 1 tablet (7 5 mg total) by mouth daily, Disp: 30 tablet, Rfl: 0    nitrofurantoin (MACRODANTIN) 50 mg capsule, Take 1 capsule (50 mg total) by mouth daily, Disp: 30 capsule, Rfl: 0    nitrofurantoin (MACRODANTIN) 50 mg capsule, Take 1 capsule (50 mg total) by mouth daily, Disp: 30 capsule, Rfl: 3    ondansetron (ZOFRAN-ODT) 4 mg disintegrating tablet, Take 1 tablet (4 mg total) by mouth every 6 (six) hours as needed for nausea or vomiting, Disp: 20 tablet, Rfl: 0    oxybutynin (DITROPAN-XL) 10 MG 24 hr tablet, Take 1 tablet (10 mg total) by mouth daily at bedtime, Disp: 30 tablet, Rfl: 11    oxybutynin (DITROPAN-XL) 5 mg 24 hr tablet, Take 1 tablet (5 mg total) by mouth daily (Patient not taking: Reported on 12/28/2020), Disp: 30 tablet, Rfl: 11    simvastatin (ZOCOR) 20 mg tablet, Take 1 tablet (20 mg total) by mouth daily at bedtime, Disp: 30 tablet, Rfl: 3    sitaGLIPtin (Januvia) 100 mg tablet, Take 1 tablet (100 mg total) by mouth daily, Disp: 30 tablet, Rfl: 3        Fredo Amezquita MD

## 2021-04-15 NOTE — TELEPHONE ENCOUNTER
Patient called stating she has appointment this afternoon at 230 pm and would like to know if it can be a virtual call      Patient can be reached [phone] (H)

## 2021-04-19 ENCOUNTER — TELEPHONE (OUTPATIENT)
Dept: HEMATOLOGY ONCOLOGY | Facility: CLINIC | Age: 56
End: 2021-04-19

## 2021-04-19 DIAGNOSIS — D45 POLYCYTHEMIA VERA (HCC): ICD-10-CM

## 2021-04-19 DIAGNOSIS — J30.1 SEASONAL ALLERGIC RHINITIS DUE TO POLLEN: ICD-10-CM

## 2021-04-19 DIAGNOSIS — E11.9 TYPE 2 DIABETES MELLITUS WITHOUT COMPLICATION, WITHOUT LONG-TERM CURRENT USE OF INSULIN (HCC): ICD-10-CM

## 2021-04-19 DIAGNOSIS — E78.2 MIXED HYPERLIPIDEMIA: ICD-10-CM

## 2021-04-19 DIAGNOSIS — Z29.8 NEED FOR PROPHYLAXIS AGAINST URINARY TRACT INFECTION: ICD-10-CM

## 2021-04-19 RX ORDER — FLUTICASONE PROPIONATE 50 MCG
2 SPRAY, SUSPENSION (ML) NASAL DAILY
Qty: 1 BOTTLE | Refills: 5 | Status: SHIPPED | OUTPATIENT
Start: 2021-04-19 | End: 2022-04-28 | Stop reason: SDUPTHER

## 2021-04-19 RX ORDER — SIMVASTATIN 20 MG
20 TABLET ORAL
Qty: 30 TABLET | Refills: 3 | Status: SHIPPED | OUTPATIENT
Start: 2021-04-19 | End: 2021-08-16 | Stop reason: SDUPTHER

## 2021-04-19 RX ORDER — HYDROXYUREA 500 MG/1
CAPSULE ORAL
Qty: 45 CAPSULE | Refills: 2 | Status: SHIPPED | OUTPATIENT
Start: 2021-04-19 | End: 2021-08-04

## 2021-04-19 RX ORDER — NITROFURANTOIN MACROCRYSTALS 50 MG/1
50 CAPSULE ORAL DAILY
Qty: 30 CAPSULE | Refills: 2 | Status: SHIPPED | OUTPATIENT
Start: 2021-04-19 | End: 2021-08-04

## 2021-04-19 NOTE — TELEPHONE ENCOUNTER
Patient is scheduled to see Dr Moshe Bauer in June  She confirmed that Macrodantin [facility] does not have refills on it  Will pend Rx for Hydrea and Macrodantin - dose and instruction confirmed for Hydrea

## 2021-04-19 NOTE — TELEPHONE ENCOUNTER
Medication Refill     Who is Calling  Patient   Medication nitrofurantoin (MACRODANTIN)     hydroxyurea (HYDREA)         How many pills left    Preferred Pharmacy / [address] [place] [address] -   Call back number [phone]   Relevant Information

## 2021-05-04 ENCOUNTER — TELEPHONE (OUTPATIENT)
Dept: FAMILY MEDICINE CLINIC | Facility: CLINIC | Age: 56
End: 2021-05-04

## 2021-05-04 DIAGNOSIS — B34.9 VIRAL INFECTION, UNSPECIFIED: Primary | ICD-10-CM

## 2021-05-05 DIAGNOSIS — B34.9 VIRAL INFECTION, UNSPECIFIED: Primary | ICD-10-CM

## 2021-05-06 PROCEDURE — U0003 INFECTIOUS AGENT DETECTION BY NUCLEIC ACID (DNA OR RNA); SEVERE ACUTE RESPIRATORY SYNDROME CORONAVIRUS 2 (SARS-COV-2) (CORONAVIRUS DISEASE [COVID-19]), AMPLIFIED PROBE TECHNIQUE, MAKING USE OF HIGH THROUGHPUT TECHNOLOGIES AS DESCRIBED BY CMS-2020-01-R: HCPCS | Performed by: FAMILY MEDICINE

## 2021-05-06 PROCEDURE — U0005 INFEC AGEN DETEC AMPLI PROBE: HCPCS | Performed by: FAMILY MEDICINE

## 2021-05-07 DIAGNOSIS — J32.9 SINUSITIS, UNSPECIFIED CHRONICITY, UNSPECIFIED LOCATION: Primary | ICD-10-CM

## 2021-05-07 RX ORDER — AZITHROMYCIN 250 MG/1
TABLET, FILM COATED ORAL
Qty: 6 TABLET | Refills: 0 | Status: SHIPPED | OUTPATIENT
Start: 2021-05-07 | End: 2021-05-07

## 2021-05-07 RX ORDER — AMOXICILLIN AND CLAVULANATE POTASSIUM 875; 125 MG/1; MG/1
1 TABLET, FILM COATED ORAL EVERY 12 HOURS SCHEDULED
Qty: 14 TABLET | Refills: 0 | Status: SHIPPED | OUTPATIENT
Start: 2021-05-07 | End: 2021-05-14

## 2021-05-17 DIAGNOSIS — K29.00 ACUTE GASTRITIS WITHOUT HEMORRHAGE, UNSPECIFIED GASTRITIS TYPE: ICD-10-CM

## 2021-05-17 RX ORDER — FAMOTIDINE 40 MG/1
40 TABLET, FILM COATED ORAL DAILY
Qty: 90 TABLET | Refills: 3 | Status: SHIPPED | OUTPATIENT
Start: 2021-05-17 | End: 2021-06-04 | Stop reason: SDUPTHER

## 2021-05-21 ENCOUNTER — TELEPHONE (OUTPATIENT)
Dept: HEMATOLOGY ONCOLOGY | Facility: CLINIC | Age: 56
End: 2021-05-21

## 2021-05-21 NOTE — TELEPHONE ENCOUNTER
Patient advised that Dr Judy Rasheed will be out of the office on 6/4/2021      Patient's follow up appointment has been rescheduled to 6/22/202 @ 3:00 PM     Patient verbalized understanding and agreement

## 2021-06-04 DIAGNOSIS — K29.00 ACUTE GASTRITIS WITHOUT HEMORRHAGE, UNSPECIFIED GASTRITIS TYPE: ICD-10-CM

## 2021-06-04 RX ORDER — FAMOTIDINE 40 MG/1
40 TABLET, FILM COATED ORAL DAILY
Qty: 90 TABLET | Refills: 3 | Status: SHIPPED | OUTPATIENT
Start: 2021-06-04 | End: 2022-06-07 | Stop reason: SDUPTHER

## 2021-06-22 ENCOUNTER — TELEPHONE (OUTPATIENT)
Dept: HEMATOLOGY ONCOLOGY | Facility: CLINIC | Age: 56
End: 2021-06-22

## 2021-06-22 NOTE — TELEPHONE ENCOUNTER
Appointment Cancellation Or Reschedule     Person calling in Patient     Provider Dr Kylah Bang Date and Time 6/22 3pm    Office Visit Location [place]    Did patient want to reschedule their office appointment? If so, when was it scheduled to? No    Is this patient calling to reschedule an infusion appointment? no   Is this patient a Chemo patient? no   When is their next infusion visit? N/a    Reason for Cancellation or Reschedule Will call back to reschedule      If the patient is a treatment patient, please route this to the office nurse  If the patient is not on treatment, please route to the office MA

## 2021-07-13 ENCOUNTER — HOSPITAL ENCOUNTER (OUTPATIENT)
Dept: NON INVASIVE DIAGNOSTICS | Facility: HOSPITAL | Age: 56
Discharge: HOME | End: 2021-07-13
Attending: SURGERY
Payer: COMMERCIAL

## 2021-07-13 DIAGNOSIS — I74.09 AORTOILIAC OCCLUSIVE DISEASE (HCC): ICD-10-CM

## 2021-07-13 PROCEDURE — 93978 VASCULAR STUDY: CPT

## 2021-07-14 ENCOUNTER — OFFICE VISIT (OUTPATIENT)
Dept: FAMILY MEDICINE CLINIC | Facility: CLINIC | Age: 56
End: 2021-07-14
Payer: COMMERCIAL

## 2021-07-14 VITALS
BODY MASS INDEX: 29.41 KG/M2 | DIASTOLIC BLOOD PRESSURE: 82 MMHG | SYSTOLIC BLOOD PRESSURE: 126 MMHG | OXYGEN SATURATION: 98 % | WEIGHT: 166 LBS | HEART RATE: 80 BPM | HEIGHT: 63 IN | TEMPERATURE: 97.2 F

## 2021-07-14 DIAGNOSIS — R22.2 ABDOMINAL WALL LUMP: ICD-10-CM

## 2021-07-14 DIAGNOSIS — E11.9 TYPE 2 DIABETES MELLITUS WITHOUT COMPLICATION, WITHOUT LONG-TERM CURRENT USE OF INSULIN (HCC): Primary | ICD-10-CM

## 2021-07-14 LAB — SL AMB POCT HEMOGLOBIN AIC: 7.3 (ref ?–6.5)

## 2021-07-14 PROCEDURE — 4004F PT TOBACCO SCREEN RCVD TLK: CPT | Performed by: PHYSICIAN ASSISTANT

## 2021-07-14 PROCEDURE — 3725F SCREEN DEPRESSION PERFORMED: CPT | Performed by: PHYSICIAN ASSISTANT

## 2021-07-14 PROCEDURE — 83036 HEMOGLOBIN GLYCOSYLATED A1C: CPT | Performed by: PHYSICIAN ASSISTANT

## 2021-07-14 PROCEDURE — 93978 VASCULAR STUDY: CPT | Performed by: SURGERY

## 2021-07-14 PROCEDURE — 99214 OFFICE O/P EST MOD 30 MIN: CPT | Performed by: PHYSICIAN ASSISTANT

## 2021-07-14 PROCEDURE — 3008F BODY MASS INDEX DOCD: CPT | Performed by: PHYSICIAN ASSISTANT

## 2021-07-14 PROCEDURE — 3051F HG A1C>EQUAL 7.0%<8.0%: CPT | Performed by: PHYSICIAN ASSISTANT

## 2021-07-14 NOTE — PROGRESS NOTES
Assessment/Plan:    Problem List Items Addressed This Visit     None      Visit Diagnoses     Type 2 diabetes mellitus without complication, without long-term current use of insulin (HCC)    -  Primary    Relevant Orders    POCT hemoglobin A1c (Completed)    Ambulatory referral to Podiatry    Abdominal wall lump        Relevant Orders    US abdominal wall           Diagnoses and all orders for this visit:    Type 2 diabetes mellitus without complication, without long-term current use of insulin (HCC)  -     POCT hemoglobin A1c  -     Ambulatory referral to Podiatry; Future    Abdominal wall lump  -     US abdominal wall; Future        POCT A1C elevated at 7 3  I asked patient to cut back on cheesecake  Will recheck again in 3 months  Referral placed to podiatry  Subjective:      Patient ID: Nicole Hanks is a 54 y o  female  Winifred Cho is a 54year old female who is here today complaining of a lump in her lower abdominal wall  She noticed it about 1-2 weeks ago  It is located in the suprapubic area  It is not tender  She notes that it was about the size of a quarter, but seems to be getting larger  She had a vascular study completed yesterday, and the tech noted that she should get it looked at  She has been having some pain around her umbilicus that feels like a burning sensation, but she has not noticed a bulge or hernia  She admits to regular bowel movements  She does suffer from hemorrhoids, so she does have blood at times  She denies any fevers, chills, nausea, vomiting, black stools, or constipation  She also notes that she has been cheating a lot on her diabetic diet  She notes she has been eating a lot of cheesecake          The following portions of the patient's history were reviewed and updated as appropriate:   She has a past medical history of Arthritis, Cancer ([address] ), Chronic kidney disease, Diabetes mellitus ([address] ), Frequent sinus infections, GERD (gastroesophageal reflux disease), History of echocardiogram (11/19/2008), History of nuclear stress test (11/19/2008), Hyperlipidemia, Hypertension, Missing tooth, acquired, PONV (postoperative nausea and vomiting), Tumor, and Wears glasses  ,  does not have any pertinent problems on file  ,   has a past surgical history that includes Hysterectomy; Appendectomy; Cholecystectomy; Bladder augmentation; [place] tooth extraction; Knee Arthroplasty; Knee arthroscopy (Bilateral); Shoulder surgery; pr colonoscopy flx dx w/collj spec when pfrmd (N/A, 1/23/2018); Colonoscopy; pr slctv cathj 3rd+ ord slctv abdl pel/lxtr brnch (Left, 6/14/2019); IR aortagram with run-off (6/14/2019); Cystoscopy (12/28/2020); Reduction mammaplasty; Hernia repair (Right); and Pubovaginal sling (N/A, 3/17/2021)  ,  family history includes Cancer in her mother; Diabetes in her father; Heart disease in her father  ,   reports that she has been smoking cigarettes  She has been smoking about 0 25 packs per day  She has never used smokeless tobacco  She reports previous alcohol use  She reports that she does not use drugs  ,  is allergic to chantix [varenicline], clomiphene, metformin, wellbutrin [bupropion], and latex     Current Outpatient Medications   Medication Sig Dispense Refill    aspirin (ECOTRIN LOW STRENGTH) 81 mg EC tablet Take 81 mg by mouth daily      Dulaglutide (Trulicity) 7 78 PA/4 3AW SOPN Inject 0 5 mL (0 75 mg total) under the skin once a week 4 pen 3    Empagliflozin (Jardiance) 10 MG TABS Take 1 tablet (10 mg total) by mouth every morning 90 tablet 3    famotidine (PEPCID) 40 MG tablet Take 1 tablet (40 mg total) by mouth daily 90 tablet 3    fluticasone (FLONASE) 50 mcg/act nasal spray 2 sprays into each nostril daily 1 Bottle 5    HYDROcodone-acetaminophen (NORCO) 5-325 mg per tablet Take 1 tablet by mouth every 6 (six) hours as needed for painMax Daily Amount: 4 tablets 20 tablet 0    hydroxyurea (HYDREA) 500 mg capsule Take 1 capsule on odd numbered days and 2 capsules on even number days  45 capsule 2    losartan (COZAAR) 25 mg tablet Take 1 tablet (25 mg total) by mouth daily 90 tablet 3    meloxicam (MOBIC) 7 5 mg tablet Take 1 tablet (7 5 mg total) by mouth daily 30 tablet 0    ondansetron (ZOFRAN-ODT) 4 mg disintegrating tablet Take 1 tablet (4 mg total) by mouth every 6 (six) hours as needed for nausea or vomiting 20 tablet 0    oxybutynin (DITROPAN-XL) 10 MG 24 hr tablet Take 1 tablet (10 mg total) by mouth daily at bedtime 30 tablet 11    oxybutynin (DITROPAN-XL) 5 mg 24 hr tablet Take 1 tablet (5 mg total) by mouth daily 30 tablet 11    simvastatin (ZOCOR) 20 mg tablet Take 1 tablet (20 mg total) by mouth daily at bedtime 30 tablet 3    sitaGLIPtin (Januvia) 100 mg tablet Take 1 tablet (100 mg total) by mouth daily 30 tablet 3    nitrofurantoin (MACRODANTIN) 50 mg capsule Take 1 capsule (50 mg total) by mouth daily (Patient not taking: Reported on 7/14/2021) 30 capsule 3    nitrofurantoin (MACRODANTIN) 50 mg capsule Take 1 capsule (50 mg total) by mouth daily (Patient not taking: Reported on 7/14/2021) 30 capsule 2     No current facility-administered medications for this visit  Review of Systems   Constitutional: Negative for chills, diaphoresis, fatigue and fever  HENT: Negative for congestion, ear pain, postnasal drip, rhinorrhea, sneezing, sore throat and trouble swallowing  Eyes: Negative for pain and visual disturbance  Respiratory: Negative for apnea, cough, shortness of breath and wheezing  Cardiovascular: Negative for chest pain and palpitations  Gastrointestinal: Positive for abdominal pain  Negative for constipation, diarrhea, nausea and vomiting  Lump in abdomen   Genitourinary: Negative for dysuria and hematuria  Musculoskeletal: Negative for arthralgias, gait problem and myalgias  Neurological: Negative for dizziness, syncope, weakness, light-headedness, numbness and headaches     Psychiatric/Behavioral: Negative for suicidal ideas  The patient is not nervous/anxious  Objective:  Vitals:    07/14/21 1452   BP: 126/82   Pulse: 80   Temp: (!) 97 2 °F (36 2 °C)   SpO2: 98%   Weight: 75 3 kg (166 lb)   Height: 5' 3" (1 6 m)     Body mass index is 29 41 kg/m²  Physical Exam  Vitals and nursing note reviewed  Constitutional:       Appearance: She is well-developed  HENT:      Head: Normocephalic and atraumatic  Right Ear: Tympanic membrane, ear canal and external ear normal       Left Ear: Tympanic membrane, ear canal and external ear normal       Nose: Nose normal       Mouth/Throat:      Pharynx: No oropharyngeal exudate or posterior oropharyngeal erythema  Eyes:      Pupils: Pupils are equal, round, and reactive to light  Cardiovascular:      Rate and Rhythm: Normal rate and regular rhythm  Pulses: no weak pulses          Dorsalis pedis pulses are 2+ on the right side and 2+ on the left side  Posterior tibial pulses are 2+ on the right side and 2+ on the left side  Heart sounds: Normal heart sounds  No murmur heard  No friction rub  No gallop  Pulmonary:      Effort: Pulmonary effort is normal  No respiratory distress  Breath sounds: Normal breath sounds  No wheezing or rales  Abdominal:      General: Bowel sounds are normal       Palpations: Abdomen is soft  Tenderness: There is abdominal tenderness (mild LLQ and umbilical)  There is no guarding or rebound  Comments: +palpable suprapubic soft tissue mass measuring approximately 3 cm in diameter  Non-tender to palpation  No erythema or drainage  Musculoskeletal:         General: Normal range of motion  Cervical back: Normal range of motion and neck supple  Feet:      Right foot:      Skin integrity: No ulcer, skin breakdown, erythema, warmth, callus or dry skin  Left foot:      Skin integrity: No ulcer, skin breakdown, erythema, warmth, callus or dry skin     Lymphadenopathy:      Cervical: No cervical adenopathy  Skin:     General: Skin is warm and dry  Neurological:      Mental Status: She is alert and oriented to person, place, and time  Psychiatric:         Behavior: Behavior normal          Thought Content: Thought content normal          Judgment: Judgment normal          Patient's shoes and socks removed  Right Foot/Ankle   Right Foot Inspection  Skin Exam: skin normal and skin intact no dry skin, no warmth, no callus, no erythema, no maceration, no abnormal color, no pre-ulcer, no ulcer and no callus                          Toe Exam: ROM and strength within normal limits    Vascular  Capillary refills: < 3 seconds  The right DP pulse is 2+  The right PT pulse is 2+  Left Foot/Ankle  Left Foot Inspection  Skin Exam: skin normal and skin intactno dry skin, no warmth, no erythema, no maceration, normal color, no pre-ulcer, no ulcer and no callus                         Toe Exam: ROM and strength within normal limits                     Vascular  Capillary refills: < 3 seconds  The left DP pulse is 2+  The left PT pulse is 2+  Assign Risk Category:  No deformity present; No loss of protective sensation;  No weak pulses       Risk: 0

## 2021-07-19 ENCOUNTER — HOSPITAL ENCOUNTER (OUTPATIENT)
Dept: ULTRASOUND IMAGING | Facility: HOSPITAL | Age: 56
Discharge: HOME | End: 2021-07-19
Payer: COMMERCIAL

## 2021-07-19 DIAGNOSIS — R22.2 ABDOMINAL WALL LUMP: ICD-10-CM

## 2021-07-19 PROCEDURE — 76705 ECHO EXAM OF ABDOMEN: CPT

## 2021-07-23 DIAGNOSIS — R22.2 ABDOMINAL WALL LUMP: Primary | ICD-10-CM

## 2021-07-27 DIAGNOSIS — E11.65 TYPE 2 DIABETES MELLITUS WITH HYPERGLYCEMIA, WITHOUT LONG-TERM CURRENT USE OF INSULIN (HCC): ICD-10-CM

## 2021-07-27 RX ORDER — DULAGLUTIDE 0.75 MG/.5ML
0.75 INJECTION, SOLUTION SUBCUTANEOUS WEEKLY
Qty: 2 ML | Refills: 5 | Status: SHIPPED | OUTPATIENT
Start: 2021-07-27 | End: 2022-01-20 | Stop reason: SDUPTHER

## 2021-07-30 ENCOUNTER — APPOINTMENT (OUTPATIENT)
Dept: LAB | Facility: HOSPITAL | Age: 56
End: 2021-07-30
Payer: COMMERCIAL

## 2021-07-30 ENCOUNTER — HOSPITAL ENCOUNTER (OUTPATIENT)
Dept: CT IMAGING | Facility: HOSPITAL | Age: 56
Discharge: HOME | End: 2021-07-30
Payer: COMMERCIAL

## 2021-07-30 DIAGNOSIS — D45 PV (POLYCYTHEMIA VERA) (HCC): ICD-10-CM

## 2021-07-30 DIAGNOSIS — I10 ESSENTIAL HYPERTENSION: ICD-10-CM

## 2021-07-30 DIAGNOSIS — I10 ESSENTIAL HYPERTENSION: Primary | ICD-10-CM

## 2021-07-30 DIAGNOSIS — R53.83 FATIGUE, UNSPECIFIED TYPE: ICD-10-CM

## 2021-07-30 DIAGNOSIS — R22.2 ABDOMINAL WALL LUMP: ICD-10-CM

## 2021-07-30 LAB
ALBUMIN SERPL BCP-MCNC: 3.3 G/DL (ref 3.5–5)
ALP SERPL-CCNC: 98 U/L (ref 46–116)
ALT SERPL W P-5'-P-CCNC: 21 U/L (ref 12–78)
ANION GAP SERPL CALCULATED.3IONS-SCNC: 11 MMOL/L (ref 4–13)
AST SERPL W P-5'-P-CCNC: 13 U/L (ref 5–45)
BASOPHILS # BLD AUTO: 0.09 THOUSANDS/ΜL (ref 0–0.1)
BASOPHILS NFR BLD AUTO: 1 % (ref 0–1)
BILIRUB SERPL-MCNC: 0.58 MG/DL (ref 0.2–1)
BUN SERPL-MCNC: 15 MG/DL (ref 5–25)
CALCIUM ALBUM COR SERPL-MCNC: 9.6 MG/DL (ref 8.3–10.1)
CALCIUM SERPL-MCNC: 9 MG/DL (ref 8.3–10.1)
CHLORIDE SERPL-SCNC: 106 MMOL/L (ref 100–108)
CO2 SERPL-SCNC: 25 MMOL/L (ref 21–32)
CREAT SERPL-MCNC: 0.82 MG/DL (ref 0.6–1.3)
EOSINOPHIL # BLD AUTO: 0.28 THOUSAND/ΜL (ref 0–0.61)
EOSINOPHIL NFR BLD AUTO: 3 % (ref 0–6)
ERYTHROCYTE [DISTWIDTH] IN BLOOD BY AUTOMATED COUNT: 13.2 % (ref 11.6–15.1)
FERRITIN SERPL-MCNC: 77 NG/ML (ref 8–388)
GFR SERPL CREATININE-BSD FRML MDRD: 81 ML/MIN/1.73SQ M
GLUCOSE P FAST SERPL-MCNC: 136 MG/DL (ref 65–99)
HCT VFR BLD AUTO: 47.1 % (ref 34.8–46.1)
HGB BLD-MCNC: 15.5 G/DL (ref 11.5–15.4)
IMM GRANULOCYTES # BLD AUTO: 0.05 THOUSAND/UL (ref 0–0.2)
IMM GRANULOCYTES NFR BLD AUTO: 0 % (ref 0–2)
IRON SATN MFR SERPL: 24 %
IRON SERPL-MCNC: 79 UG/DL (ref 50–170)
LYMPHOCYTES # BLD AUTO: 2.98 THOUSANDS/ΜL (ref 0.6–4.47)
LYMPHOCYTES NFR BLD AUTO: 26 % (ref 14–44)
MCH RBC QN AUTO: 37.3 PG (ref 26.8–34.3)
MCHC RBC AUTO-ENTMCNC: 32.9 G/DL (ref 31.4–37.4)
MCV RBC AUTO: 114 FL (ref 82–98)
MONOCYTES # BLD AUTO: 0.65 THOUSAND/ΜL (ref 0.17–1.22)
MONOCYTES NFR BLD AUTO: 6 % (ref 4–12)
NEUTROPHILS # BLD AUTO: 7.35 THOUSANDS/ΜL (ref 1.85–7.62)
NEUTS SEG NFR BLD AUTO: 64 % (ref 43–75)
NRBC BLD AUTO-RTO: 0 /100 WBCS
PLATELET # BLD AUTO: 284 THOUSANDS/UL (ref 149–390)
PMV BLD AUTO: 9 FL (ref 8.9–12.7)
POTASSIUM SERPL-SCNC: 4.1 MMOL/L (ref 3.5–5.3)
PROT SERPL-MCNC: 7.5 G/DL (ref 6.4–8.2)
RBC # BLD AUTO: 4.15 MILLION/UL (ref 3.81–5.12)
SODIUM SERPL-SCNC: 142 MMOL/L (ref 136–145)
TIBC SERPL-MCNC: 323 UG/DL (ref 250–450)
WBC # BLD AUTO: 11.4 THOUSAND/UL (ref 4.31–10.16)

## 2021-07-30 PROCEDURE — 85025 COMPLETE CBC W/AUTO DIFF WBC: CPT

## 2021-07-30 PROCEDURE — 82728 ASSAY OF FERRITIN: CPT

## 2021-07-30 PROCEDURE — 36415 COLL VENOUS BLD VENIPUNCTURE: CPT

## 2021-07-30 PROCEDURE — 83540 ASSAY OF IRON: CPT

## 2021-07-30 PROCEDURE — 83550 IRON BINDING TEST: CPT

## 2021-07-30 PROCEDURE — 80053 COMPREHEN METABOLIC PANEL: CPT

## 2021-07-30 PROCEDURE — G1004 CDSM NDSC: HCPCS

## 2021-07-30 PROCEDURE — 74177 CT ABD & PELVIS W/CONTRAST: CPT

## 2021-07-30 RX ADMIN — IOHEXOL 100 ML: 350 INJECTION, SOLUTION INTRAVENOUS at 11:57

## 2021-08-02 DIAGNOSIS — Z29.89 NEED FOR PROPHYLAXIS AGAINST URINARY TRACT INFECTION: ICD-10-CM

## 2021-08-02 DIAGNOSIS — D45 POLYCYTHEMIA VERA (HCC): ICD-10-CM

## 2021-08-02 DIAGNOSIS — N39.41 URGE INCONTINENCE: ICD-10-CM

## 2021-08-02 RX ORDER — HYDROXYUREA 500 MG/1
CAPSULE ORAL
Qty: 45 CAPSULE | Refills: 0 | Status: CANCELLED | OUTPATIENT
Start: 2021-08-02

## 2021-08-02 RX ORDER — NITROFURANTOIN MACROCRYSTALS 50 MG/1
50 CAPSULE ORAL DAILY
Qty: 30 CAPSULE | Refills: 0 | Status: CANCELLED | OUTPATIENT
Start: 2021-08-02

## 2021-08-02 RX ORDER — OXYBUTYNIN CHLORIDE 10 MG/1
10 TABLET, EXTENDED RELEASE ORAL
Qty: 30 TABLET | Refills: 0 | Status: SHIPPED | OUTPATIENT
Start: 2021-08-02 | End: 2021-10-04 | Stop reason: SDUPTHER

## 2021-08-03 DIAGNOSIS — Z29.8 NEED FOR PROPHYLAXIS AGAINST URINARY TRACT INFECTION: ICD-10-CM

## 2021-08-03 DIAGNOSIS — D45 POLYCYTHEMIA VERA (HCC): ICD-10-CM

## 2021-08-04 RX ORDER — HYDROXYUREA 500 MG/1
CAPSULE ORAL
Qty: 45 CAPSULE | Refills: 5 | Status: SHIPPED | OUTPATIENT
Start: 2021-08-04 | End: 2022-02-03 | Stop reason: SDUPTHER

## 2021-08-04 RX ORDER — NITROFURANTOIN MACROCRYSTALS 50 MG/1
CAPSULE ORAL
Qty: 30 CAPSULE | Refills: 0 | Status: SHIPPED | OUTPATIENT
Start: 2021-08-04 | End: 2021-10-04 | Stop reason: SDUPTHER

## 2021-08-16 DIAGNOSIS — E11.9 TYPE 2 DIABETES MELLITUS WITHOUT COMPLICATION, WITHOUT LONG-TERM CURRENT USE OF INSULIN (HCC): ICD-10-CM

## 2021-08-16 DIAGNOSIS — E78.2 MIXED HYPERLIPIDEMIA: ICD-10-CM

## 2021-08-16 RX ORDER — SIMVASTATIN 20 MG
20 TABLET ORAL
Qty: 30 TABLET | Refills: 3 | Status: SHIPPED | OUTPATIENT
Start: 2021-08-16 | End: 2021-08-23 | Stop reason: SDUPTHER

## 2021-08-23 DIAGNOSIS — E78.2 MIXED HYPERLIPIDEMIA: ICD-10-CM

## 2021-08-23 RX ORDER — SIMVASTATIN 20 MG
20 TABLET ORAL
Qty: 30 TABLET | Refills: 3 | Status: SHIPPED | OUTPATIENT
Start: 2021-08-23 | End: 2021-11-22 | Stop reason: SDUPTHER

## 2021-08-31 ENCOUNTER — TELEPHONE (OUTPATIENT)
Dept: FAMILY MEDICINE CLINIC | Facility: CLINIC | Age: 56
End: 2021-08-31

## 2021-09-17 ENCOUNTER — TELEPHONE (OUTPATIENT)
Dept: FAMILY MEDICINE CLINIC | Facility: CLINIC | Age: 56
End: 2021-09-17

## 2021-09-17 DIAGNOSIS — J32.9 SINUSITIS, UNSPECIFIED CHRONICITY, UNSPECIFIED LOCATION: Primary | ICD-10-CM

## 2021-09-17 RX ORDER — AMOXICILLIN AND CLAVULANATE POTASSIUM 875; 125 MG/1; MG/1
1 TABLET, FILM COATED ORAL EVERY 12 HOURS SCHEDULED
Qty: 14 TABLET | Refills: 0 | Status: SHIPPED | OUTPATIENT
Start: 2021-09-17 | End: 2021-09-24

## 2021-09-17 NOTE — TELEPHONE ENCOUNTER
Pt c/o chest cold, cough and yellow/green mucous  Asking if you would give her a prescription for Augmentin?

## 2021-09-20 ENCOUNTER — TELEPHONE (OUTPATIENT)
Dept: FAMILY MEDICINE CLINIC | Facility: CLINIC | Age: 56
End: 2021-09-20

## 2021-09-20 DIAGNOSIS — E11.9 TYPE 2 DIABETES MELLITUS WITHOUT COMPLICATION, WITHOUT LONG-TERM CURRENT USE OF INSULIN (HCC): ICD-10-CM

## 2021-09-20 NOTE — TELEPHONE ENCOUNTER
Please call patient  Insurance will not cover [facility] for her  Patient is already on Jardiance and Trulicity  The should keep her sugars controlled  Will monitor at her next appointment    Have her schedule an appointment in 2 months

## 2021-10-04 DIAGNOSIS — Z29.8 NEED FOR PROPHYLAXIS AGAINST URINARY TRACT INFECTION: ICD-10-CM

## 2021-10-04 DIAGNOSIS — N39.41 URGE INCONTINENCE: ICD-10-CM

## 2021-10-04 RX ORDER — NITROFURANTOIN MACROCRYSTALS 50 MG/1
50 CAPSULE ORAL DAILY
Qty: 30 CAPSULE | Refills: 2 | Status: SHIPPED | OUTPATIENT
Start: 2021-10-04 | End: 2022-01-05 | Stop reason: SDUPTHER

## 2021-10-04 RX ORDER — OXYBUTYNIN CHLORIDE 10 MG/1
10 TABLET, EXTENDED RELEASE ORAL
Qty: 30 TABLET | Refills: 0 | Status: SHIPPED | OUTPATIENT
Start: 2021-10-04 | End: 2021-11-08 | Stop reason: SDUPTHER

## 2021-10-18 ENCOUNTER — TELEPHONE (OUTPATIENT)
Dept: UROLOGY | Facility: MEDICAL CENTER | Age: 56
End: 2021-10-18

## 2021-10-19 ENCOUNTER — TELEMEDICINE (OUTPATIENT)
Dept: FAMILY MEDICINE CLINIC | Facility: CLINIC | Age: 56
End: 2021-10-19
Payer: COMMERCIAL

## 2021-10-19 VITALS — HEIGHT: 63 IN | WEIGHT: 166 LBS | BODY MASS INDEX: 29.41 KG/M2

## 2021-10-19 DIAGNOSIS — J06.9 UPPER RESPIRATORY TRACT INFECTION, UNSPECIFIED TYPE: Primary | ICD-10-CM

## 2021-10-19 PROCEDURE — G2012 BRIEF CHECK IN BY MD/QHP: HCPCS | Performed by: PHYSICIAN ASSISTANT

## 2021-10-19 RX ORDER — PREDNISONE 10 MG/1
TABLET ORAL
Qty: 30 TABLET | Refills: 0 | Status: SHIPPED | OUTPATIENT
Start: 2021-10-19 | End: 2021-10-31

## 2021-10-19 RX ORDER — AMOXICILLIN AND CLAVULANATE POTASSIUM 875; 125 MG/1; MG/1
1 TABLET, FILM COATED ORAL EVERY 12 HOURS SCHEDULED
Qty: 14 TABLET | Refills: 0 | Status: SHIPPED | OUTPATIENT
Start: 2021-10-19 | End: 2021-10-26

## 2021-11-08 DIAGNOSIS — N39.41 URGE INCONTINENCE: ICD-10-CM

## 2021-11-08 RX ORDER — OXYBUTYNIN CHLORIDE 10 MG/1
10 TABLET, EXTENDED RELEASE ORAL
Qty: 30 TABLET | Refills: 12 | Status: SHIPPED | OUTPATIENT
Start: 2021-11-08

## 2021-11-22 DIAGNOSIS — E78.2 MIXED HYPERLIPIDEMIA: ICD-10-CM

## 2021-11-22 RX ORDER — SIMVASTATIN 20 MG
20 TABLET ORAL
Qty: 90 TABLET | Refills: 3 | Status: SHIPPED | OUTPATIENT
Start: 2021-11-22

## 2021-12-10 ENCOUNTER — OFFICE VISIT (OUTPATIENT)
Dept: UROLOGY | Facility: CLINIC | Age: 56
End: 2021-12-10
Payer: COMMERCIAL

## 2021-12-10 VITALS
DIASTOLIC BLOOD PRESSURE: 80 MMHG | HEART RATE: 76 BPM | WEIGHT: 166 LBS | BODY MASS INDEX: 29.41 KG/M2 | SYSTOLIC BLOOD PRESSURE: 124 MMHG

## 2021-12-10 DIAGNOSIS — N89.8 VAGINAL MASS: Primary | ICD-10-CM

## 2021-12-10 DIAGNOSIS — R39.14 FEELING OF INCOMPLETE BLADDER EMPTYING: ICD-10-CM

## 2021-12-10 DIAGNOSIS — G89.29 RIGHT FLANK PAIN, CHRONIC: ICD-10-CM

## 2021-12-10 DIAGNOSIS — R10.9 RIGHT FLANK PAIN, CHRONIC: ICD-10-CM

## 2021-12-10 PROCEDURE — 3074F SYST BP LT 130 MM HG: CPT | Performed by: UROLOGY

## 2021-12-10 PROCEDURE — 99214 OFFICE O/P EST MOD 30 MIN: CPT | Performed by: UROLOGY

## 2021-12-10 PROCEDURE — 3079F DIAST BP 80-89 MM HG: CPT | Performed by: UROLOGY

## 2021-12-21 ENCOUNTER — HOSPITAL ENCOUNTER (OUTPATIENT)
Dept: MRI IMAGING | Facility: HOSPITAL | Age: 56
Discharge: HOME | End: 2021-12-21
Attending: UROLOGY
Payer: COMMERCIAL

## 2021-12-21 ENCOUNTER — HOSPITAL ENCOUNTER (OUTPATIENT)
Dept: ULTRASOUND IMAGING | Facility: HOSPITAL | Age: 56
Discharge: HOME | End: 2021-12-21
Attending: UROLOGY
Payer: COMMERCIAL

## 2021-12-21 DIAGNOSIS — N89.8 VAGINAL MASS: ICD-10-CM

## 2021-12-21 DIAGNOSIS — R39.14 FEELING OF INCOMPLETE BLADDER EMPTYING: ICD-10-CM

## 2021-12-21 DIAGNOSIS — R10.9 RIGHT FLANK PAIN, CHRONIC: ICD-10-CM

## 2021-12-21 DIAGNOSIS — G89.29 RIGHT FLANK PAIN, CHRONIC: ICD-10-CM

## 2021-12-21 PROCEDURE — 72197 MRI PELVIS W/O & W/DYE: CPT

## 2021-12-21 PROCEDURE — 76770 US EXAM ABDO BACK WALL COMP: CPT

## 2021-12-21 PROCEDURE — A9585 GADOBUTROL INJECTION: HCPCS | Performed by: UROLOGY

## 2021-12-21 PROCEDURE — G1004 CDSM NDSC: HCPCS

## 2021-12-21 RX ADMIN — GADOBUTROL 7 ML: 604.72 INJECTION INTRAVENOUS at 13:13

## 2021-12-23 ENCOUNTER — TELEPHONE (OUTPATIENT)
Dept: UROLOGY | Facility: CLINIC | Age: 56
End: 2021-12-23

## 2022-01-05 DIAGNOSIS — Z29.8 NEED FOR PROPHYLAXIS AGAINST URINARY TRACT INFECTION: ICD-10-CM

## 2022-01-05 RX ORDER — NITROFURANTOIN MACROCRYSTALS 50 MG/1
50 CAPSULE ORAL DAILY
Qty: 30 CAPSULE | Refills: 3 | Status: SHIPPED | OUTPATIENT
Start: 2022-01-05 | End: 2022-05-12 | Stop reason: SDUPTHER

## 2022-01-11 ENCOUNTER — TELEPHONE (OUTPATIENT)
Dept: FAMILY MEDICINE CLINIC | Facility: CLINIC | Age: 57
End: 2022-01-11

## 2022-01-11 DIAGNOSIS — Z20.822 EXPOSURE TO COVID-19 VIRUS: ICD-10-CM

## 2022-01-11 DIAGNOSIS — B34.9 VIRAL INFECTION, UNSPECIFIED: ICD-10-CM

## 2022-01-11 NOTE — TELEPHONE ENCOUNTER
Daughter fiance positive for covid, she has cough, congestion, headache, fatigue, body aches, chills, diarrhea, and nausea   Can you order a covid test?

## 2022-01-12 ENCOUNTER — TELEPHONE (OUTPATIENT)
Dept: HEMATOLOGY ONCOLOGY | Facility: CLINIC | Age: 57
End: 2022-01-12

## 2022-01-12 NOTE — TELEPHONE ENCOUNTER
Patient is calling in requesting to r/s consult with Dr Robyne Sicard  She believes she might be positive for covid  Made Antonio Parisi aware, she requested patient to be r/s for 2/1  New appointment has been made for 2/1 at 3pm with Dr Robyne Sicard, patient voiced understanding

## 2022-01-14 PROCEDURE — U0003 INFECTIOUS AGENT DETECTION BY NUCLEIC ACID (DNA OR RNA); SEVERE ACUTE RESPIRATORY SYNDROME CORONAVIRUS 2 (SARS-COV-2) (CORONAVIRUS DISEASE [COVID-19]), AMPLIFIED PROBE TECHNIQUE, MAKING USE OF HIGH THROUGHPUT TECHNOLOGIES AS DESCRIBED BY CMS-2020-01-R: HCPCS | Performed by: FAMILY MEDICINE

## 2022-01-14 PROCEDURE — U0005 INFEC AGEN DETEC AMPLI PROBE: HCPCS | Performed by: FAMILY MEDICINE

## 2022-01-20 DIAGNOSIS — E11.65 TYPE 2 DIABETES MELLITUS WITH HYPERGLYCEMIA, WITHOUT LONG-TERM CURRENT USE OF INSULIN (HCC): ICD-10-CM

## 2022-01-20 RX ORDER — DULAGLUTIDE 0.75 MG/.5ML
0.75 INJECTION, SOLUTION SUBCUTANEOUS WEEKLY
Qty: 2 ML | Refills: 5 | Status: SHIPPED | OUTPATIENT
Start: 2022-01-20 | End: 2022-07-07 | Stop reason: SDUPTHER

## 2022-02-03 ENCOUNTER — TELEPHONE (OUTPATIENT)
Dept: HEMATOLOGY ONCOLOGY | Facility: CLINIC | Age: 57
End: 2022-02-03

## 2022-02-03 DIAGNOSIS — D45 POLYCYTHEMIA VERA (HCC): ICD-10-CM

## 2022-02-03 RX ORDER — HYDROXYUREA 500 MG/1
CAPSULE ORAL
Qty: 45 CAPSULE | Refills: 3 | Status: SHIPPED | OUTPATIENT
Start: 2022-02-03 | End: 2022-05-12 | Stop reason: SDUPTHER

## 2022-02-03 NOTE — TELEPHONE ENCOUNTER
Phoned pts  and advised him the refill for hydroxyurea was sent to the [address] [place] [address] on file  He voiced understanding

## 2022-02-21 DIAGNOSIS — E11.65 TYPE 2 DIABETES MELLITUS WITH HYPERGLYCEMIA, WITHOUT LONG-TERM CURRENT USE OF INSULIN (HCC): ICD-10-CM

## 2022-03-23 ENCOUNTER — VBI (OUTPATIENT)
Dept: ADMINISTRATIVE | Facility: OTHER | Age: 57
End: 2022-03-23

## 2022-03-28 ENCOUNTER — HOSPITAL ENCOUNTER (EMERGENCY)
Facility: HOSPITAL | Age: 57
Discharge: HOME | End: 2022-03-28
Attending: EMERGENCY MEDICINE | Admitting: EMERGENCY MEDICINE
Payer: COMMERCIAL

## 2022-03-28 ENCOUNTER — APPOINTMENT (EMERGENCY)
Dept: CT IMAGING | Facility: HOSPITAL | Age: 57
End: 2022-03-28
Payer: COMMERCIAL

## 2022-03-28 ENCOUNTER — TELEPHONE (OUTPATIENT)
Dept: FAMILY MEDICINE CLINIC | Facility: CLINIC | Age: 57
End: 2022-03-28

## 2022-03-28 VITALS
BODY MASS INDEX: 29.23 KG/M2 | WEIGHT: 165 LBS | HEART RATE: 84 BPM | TEMPERATURE: 98.9 F | RESPIRATION RATE: 18 BRPM | HEIGHT: 63 IN | DIASTOLIC BLOOD PRESSURE: 62 MMHG | SYSTOLIC BLOOD PRESSURE: 133 MMHG | OXYGEN SATURATION: 95 %

## 2022-03-28 DIAGNOSIS — R10.9 ABDOMINAL PAIN: ICD-10-CM

## 2022-03-28 DIAGNOSIS — N39.0 URINARY TRACT INFECTION: Primary | ICD-10-CM

## 2022-03-28 LAB
ALBUMIN SERPL BCP-MCNC: 3.2 G/DL (ref 3.5–5)
ALP SERPL-CCNC: 82 U/L (ref 46–116)
ALT SERPL W P-5'-P-CCNC: 26 U/L (ref 12–78)
ANION GAP SERPL CALCULATED.3IONS-SCNC: 13 MMOL/L (ref 4–13)
APTT PPP: 24 SECONDS (ref 23–37)
AST SERPL W P-5'-P-CCNC: 27 U/L (ref 5–45)
BACTERIA UR QL AUTO: ABNORMAL /HPF
BASOPHILS # BLD AUTO: 0.02 THOUSANDS/ΜL (ref 0–0.1)
BASOPHILS NFR BLD AUTO: 0 % (ref 0–1)
BILIRUB SERPL-MCNC: 0.85 MG/DL (ref 0.2–1)
BILIRUB UR QL STRIP: NEGATIVE
BUN SERPL-MCNC: 24 MG/DL (ref 5–25)
CALCIUM ALBUM COR SERPL-MCNC: 8.9 MG/DL (ref 8.3–10.1)
CALCIUM SERPL-MCNC: 8.3 MG/DL (ref 8.3–10.1)
CARDIAC TROPONIN I PNL SERPL HS: 3 NG/L
CHLORIDE SERPL-SCNC: 105 MMOL/L (ref 100–108)
CLARITY UR: ABNORMAL
CO2 SERPL-SCNC: 23 MMOL/L (ref 21–32)
COLOR UR: YELLOW
CREAT SERPL-MCNC: 0.92 MG/DL (ref 0.6–1.3)
EOSINOPHIL # BLD AUTO: 0 THOUSAND/ΜL (ref 0–0.61)
EOSINOPHIL NFR BLD AUTO: 0 % (ref 0–6)
ERYTHROCYTE [DISTWIDTH] IN BLOOD BY AUTOMATED COUNT: 13.4 % (ref 11.6–15.1)
GFR SERPL CREATININE-BSD FRML MDRD: 69 ML/MIN/1.73SQ M
GLUCOSE SERPL-MCNC: 191 MG/DL (ref 65–140)
GLUCOSE UR STRIP-MCNC: ABNORMAL MG/DL
HCT VFR BLD AUTO: 48.8 % (ref 34.8–46.1)
HGB BLD-MCNC: 15.7 G/DL (ref 11.5–15.4)
HGB UR QL STRIP.AUTO: ABNORMAL
IMM GRANULOCYTES # BLD AUTO: 0.04 THOUSAND/UL (ref 0–0.2)
IMM GRANULOCYTES NFR BLD AUTO: 1 % (ref 0–2)
INR PPP: 1.04 (ref 0.84–1.19)
KETONES UR STRIP-MCNC: ABNORMAL MG/DL
LACTATE SERPL-SCNC: 1.6 MMOL/L (ref 0.5–2)
LACTATE SERPL-SCNC: 2.3 MMOL/L (ref 0.5–2)
LEUKOCYTE ESTERASE UR QL STRIP: ABNORMAL
LIPASE SERPL-CCNC: 45 U/L (ref 73–393)
LYMPHOCYTES # BLD AUTO: 0.39 THOUSANDS/ΜL (ref 0.6–4.47)
LYMPHOCYTES NFR BLD AUTO: 6 % (ref 14–44)
MCH RBC QN AUTO: 37.4 PG (ref 26.8–34.3)
MCHC RBC AUTO-ENTMCNC: 32.2 G/DL (ref 31.4–37.4)
MCV RBC AUTO: 116 FL (ref 82–98)
MONOCYTES # BLD AUTO: 0.38 THOUSAND/ΜL (ref 0.17–1.22)
MONOCYTES NFR BLD AUTO: 5 % (ref 4–12)
NEUTROPHILS # BLD AUTO: 6.17 THOUSANDS/ΜL (ref 1.85–7.62)
NEUTS SEG NFR BLD AUTO: 88 % (ref 43–75)
NITRITE UR QL STRIP: POSITIVE
NON-SQ EPI CELLS URNS QL MICRO: ABNORMAL /HPF
NRBC BLD AUTO-RTO: 0 /100 WBCS
PH UR STRIP.AUTO: 6.5 [PH]
PLATELET # BLD AUTO: 222 THOUSANDS/UL (ref 149–390)
PMV BLD AUTO: 9.4 FL (ref 8.9–12.7)
POTASSIUM SERPL-SCNC: 4 MMOL/L (ref 3.5–5.3)
PROT SERPL-MCNC: 7.2 G/DL (ref 6.4–8.2)
PROT UR STRIP-MCNC: NEGATIVE MG/DL
PROTHROMBIN TIME: 13 SECONDS (ref 11.6–14.5)
RBC # BLD AUTO: 4.2 MILLION/UL (ref 3.81–5.12)
RBC #/AREA URNS AUTO: ABNORMAL /HPF
SODIUM SERPL-SCNC: 141 MMOL/L (ref 136–145)
SP GR UR STRIP.AUTO: 1.01 (ref 1–1.03)
UROBILINOGEN UR QL STRIP.AUTO: 0.2 E.U./DL
WBC # BLD AUTO: 7 THOUSAND/UL (ref 4.31–10.16)
WBC #/AREA URNS AUTO: ABNORMAL /HPF

## 2022-03-28 PROCEDURE — 85730 THROMBOPLASTIN TIME PARTIAL: CPT | Performed by: EMERGENCY MEDICINE

## 2022-03-28 PROCEDURE — 84484 ASSAY OF TROPONIN QUANT: CPT | Performed by: EMERGENCY MEDICINE

## 2022-03-28 PROCEDURE — 99284 EMERGENCY DEPT VISIT MOD MDM: CPT | Performed by: EMERGENCY MEDICINE

## 2022-03-28 PROCEDURE — 85025 COMPLETE CBC W/AUTO DIFF WBC: CPT | Performed by: EMERGENCY MEDICINE

## 2022-03-28 PROCEDURE — 87077 CULTURE AEROBIC IDENTIFY: CPT | Performed by: EMERGENCY MEDICINE

## 2022-03-28 PROCEDURE — 87086 URINE CULTURE/COLONY COUNT: CPT | Performed by: EMERGENCY MEDICINE

## 2022-03-28 PROCEDURE — 83605 ASSAY OF LACTIC ACID: CPT | Performed by: EMERGENCY MEDICINE

## 2022-03-28 PROCEDURE — 74177 CT ABD & PELVIS W/CONTRAST: CPT

## 2022-03-28 PROCEDURE — 96361 HYDRATE IV INFUSION ADD-ON: CPT

## 2022-03-28 PROCEDURE — 87040 BLOOD CULTURE FOR BACTERIA: CPT | Performed by: EMERGENCY MEDICINE

## 2022-03-28 PROCEDURE — 85610 PROTHROMBIN TIME: CPT | Performed by: EMERGENCY MEDICINE

## 2022-03-28 PROCEDURE — 99285 EMERGENCY DEPT VISIT HI MDM: CPT

## 2022-03-28 PROCEDURE — 96375 TX/PRO/DX INJ NEW DRUG ADDON: CPT

## 2022-03-28 PROCEDURE — 87186 SC STD MICRODIL/AGAR DIL: CPT | Performed by: EMERGENCY MEDICINE

## 2022-03-28 PROCEDURE — 36415 COLL VENOUS BLD VENIPUNCTURE: CPT | Performed by: EMERGENCY MEDICINE

## 2022-03-28 PROCEDURE — 83690 ASSAY OF LIPASE: CPT | Performed by: EMERGENCY MEDICINE

## 2022-03-28 PROCEDURE — 81001 URINALYSIS AUTO W/SCOPE: CPT | Performed by: EMERGENCY MEDICINE

## 2022-03-28 PROCEDURE — 80053 COMPREHEN METABOLIC PANEL: CPT | Performed by: EMERGENCY MEDICINE

## 2022-03-28 PROCEDURE — 96374 THER/PROPH/DIAG INJ IV PUSH: CPT

## 2022-03-28 RX ORDER — DIPHENHYDRAMINE HYDROCHLORIDE 50 MG/ML
25 INJECTION INTRAMUSCULAR; INTRAVENOUS ONCE
Status: COMPLETED | OUTPATIENT
Start: 2022-03-28 | End: 2022-03-28

## 2022-03-28 RX ORDER — CEPHALEXIN 500 MG/1
500 CAPSULE ORAL EVERY 6 HOURS SCHEDULED
Qty: 28 CAPSULE | Refills: 0 | Status: SHIPPED | OUTPATIENT
Start: 2022-03-28 | End: 2022-04-04

## 2022-03-28 RX ORDER — CEPHALEXIN 250 MG/1
500 CAPSULE ORAL ONCE
Status: COMPLETED | OUTPATIENT
Start: 2022-03-28 | End: 2022-03-28

## 2022-03-28 RX ORDER — ONDANSETRON 2 MG/ML
4 INJECTION INTRAMUSCULAR; INTRAVENOUS ONCE
Status: COMPLETED | OUTPATIENT
Start: 2022-03-28 | End: 2022-03-28

## 2022-03-28 RX ORDER — METOCLOPRAMIDE HYDROCHLORIDE 5 MG/ML
10 INJECTION INTRAMUSCULAR; INTRAVENOUS ONCE
Status: COMPLETED | OUTPATIENT
Start: 2022-03-28 | End: 2022-03-28

## 2022-03-28 RX ADMIN — ONDANSETRON 4 MG: 2 INJECTION INTRAMUSCULAR; INTRAVENOUS at 12:19

## 2022-03-28 RX ADMIN — METOCLOPRAMIDE HYDROCHLORIDE 10 MG: 5 INJECTION INTRAMUSCULAR; INTRAVENOUS at 14:06

## 2022-03-28 RX ADMIN — CEPHALEXIN 500 MG: 250 CAPSULE ORAL at 17:33

## 2022-03-28 RX ADMIN — DIPHENHYDRAMINE HYDROCHLORIDE 25 MG: 50 INJECTION, SOLUTION INTRAMUSCULAR; INTRAVENOUS at 14:06

## 2022-03-28 RX ADMIN — SODIUM CHLORIDE 1000 ML: 0.9 INJECTION, SOLUTION INTRAVENOUS at 12:18

## 2022-03-28 RX ADMIN — IOHEXOL 100 ML: 350 INJECTION, SOLUTION INTRAVENOUS at 13:21

## 2022-03-28 NOTE — TELEPHONE ENCOUNTER
Pt c/o abdominal pain, green vomit and green diarrhea x 2 days  Cannot keep anything down  Last ate pork fried rice 2 days ago

## 2022-03-28 NOTE — ED PROVIDER NOTES
Final Diagnosis:  1  Urinary tract infection    2  Abdominal pain        Chief Complaint   Patient presents with    Abdominal Pain     pt reports eating pork fried rice two days ago; now having nausea, vomtting, diarhea     HPI  Patient presents for evaluation of abdominal pain vomiting  Patient states that for the last 2 days she has had repeated episodes of nonbloody, nonbilious vomiting associated with left upper quadrant abdominal pain  Patient denies any fever chills  She states that she has intermittent dysuria but states there is a number reasons that she has that symptom  Patient does have a history of cancer with a large mass between her vagina and bladder  Patient states that she has a history of cholecystectomy, appendectomy, hysterectomy and oophorectomy  Patient does have a history of diverticulitis  Denies history of nephrolithiasis  Patient denies other relieving or exacerbating factors  No radiations of pain  Unless otherwise specified:  - No language barrier    - History obtained from patient  - There are no limitations to the history obtained  - Previous charting was reviewed    PMH:   has a past medical history of Arthritis, Cancer ([facility] [address] ), Chronic kidney disease, Diabetes mellitus ([facility] [address] ), Frequent sinus infections, GERD (gastroesophageal reflux disease), History of echocardiogram (11/19/2008), History of nuclear stress test (11/19/2008), Hyperlipidemia, Hypertension, Missing tooth, acquired, PONV (postoperative nausea and vomiting), Tumor, and Wears glasses  PSH:   has a past surgical history that includes Hysterectomy; Appendectomy; Cholecystectomy; Bladder augmentation; [place] tooth extraction; Knee Arthroplasty; Knee arthroscopy (Bilateral); Shoulder surgery; pr colonoscopy flx dx w/collj spec when pfrmd (N/A, 1/23/2018); Colonoscopy; pr slctv cathj 3rd+ ord slctv abdl pel/lxtr brnch (Left, 6/14/2019); IR aortagram with run-off (6/14/2019);  Cystoscopy (12/28/2020); Reduction mammaplasty; Hernia repair (Right); and Pubovaginal sling (N/A, 3/17/2021)  ROS:  Review of Systems   -   - 13 point ROS was performed and all are normal unless stated in the history above  - Nursing note reviewed  Vitals reviewed  - Orders placed by myself and/or advanced practitioner / resident  PE:   Vitals:    03/28/22 1500 03/28/22 1530 03/28/22 1700 03/28/22 1740   BP: 105/53 113/53 133/62 133/62   BP Location:       Pulse: 91 90 84 84   Resp: 18 18 18 18   Temp:    98 9 °F (37 2 °C)   TempSrc:    Temporal   SpO2: 90% 90% 95% 95%   Weight:       Height:         Vitals reviewed by me  Patient appears uncomfortable but nondistressed sitting in bed  Patient with left-sided flank pain as well as left upper quadrant pain  No right-sided flank pain  Overall abdomen is soft and nondistended  Unless otherwise specified above:    General: VS reviewed  Appears in NAD    Head: Normocephalic, atraumatic  Eyes: EOM-I  No exudate  ENT: Atraumatic external nose and ears  No malocclusion  No stridor  No drooling  Neck: No JVD  CV: No pallor noted  Lungs:   No tachypnea  No respiratory distress    Abdomen:  Soft, non-tender, non-distended    MSK:   FROM spontaneously  No obvious deformity    Skin: Dry, intact  No obvious rash  Neuro: Awake, alert, GCS15, CN II-XII grossly intact  Speaking in full sentences  Motor grossly intact  Psychiatric/Behavioral: Appropriate mood and affect   Exam: deferred    Physical Exam     Procedures   A:  - Nursing note reviewed  ED Course as of 03/29/22 1224   Mon Mar 28, 2022   1611 On re-evaluation patient is sleeping soundly  I discussed with her that we need a urine sample to rule out infection as a possible cause of her nausea  1649 Patient ambulates around department without issue     CT abdomen pelvis with contrast   Final Result      1  Urethral mass, unchanged since 7/30/2021       2   Rectal prolapse  3  Colonic diverticulosis without evidence of diverticulitis  4   No evidence of acute abnormality in the abdomen or pelvis              Workstation performed: [id number]           Orders Placed This Encounter   Procedures    Blood culture #1    Blood culture #2    Urine culture    CT abdomen pelvis with contrast    CBC and differential    Protime-INR    APTT    Comprehensive metabolic panel    Lipase    Lactic acid    HS Troponin 0hr (reflex protocol)    Lactic acid 2 Hours    UA w Reflex to Microscopic w Reflex to Culture    Urine Microscopic     Labs Reviewed   CBC AND DIFFERENTIAL - Abnormal       Result Value Ref Range Status    WBC 7 00  4 31 - 10 16 Thousand/uL Final    RBC 4 20  3 81 - 5 12 Million/uL Final    Hemoglobin 15 7 (*) 11 5 - 15 4 g/dL Final    Hematocrit 48 8 (*) 34 8 - 46 1 % Final     (*) 82 - 98 fL Final    MCH 37 4 (*) 26 8 - 34 3 pg Final    MCHC 32 2  31 4 - 37 4 g/dL Final    RDW 13 4  11 6 - 15 1 % Final    MPV 9 4  8 9 - 12 7 fL Final    Platelets 393  231 - 390 Thousands/uL Final    nRBC 0  /100 WBCs Final    Neutrophils Relative 88 (*) 43 - 75 % Final    Immat GRANS % 1  0 - 2 % Final    Lymphocytes Relative 6 (*) 14 - 44 % Final    Monocytes Relative 5  4 - 12 % Final    Eosinophils Relative 0  0 - 6 % Final    Basophils Relative 0  0 - 1 % Final    Neutrophils Absolute 6 17  1 85 - 7 62 Thousands/µL Final    Immature Grans Absolute 0 04  0 00 - 0 20 Thousand/uL Final    Lymphocytes Absolute 0 39 (*) 0 60 - 4 47 Thousands/µL Final    Monocytes Absolute 0 38  0 17 - 1 22 Thousand/µL Final    Eosinophils Absolute 0 00  0 00 - 0 61 Thousand/µL Final    Basophils Absolute 0 02  0 00 - 0 10 Thousands/µL Final   COMPREHENSIVE METABOLIC PANEL - Abnormal    Sodium 141  136 - 145 mmol/L Final    Potassium 4 0  3 5 - 5 3 mmol/L Final    Chloride 105  100 - 108 mmol/L Final    CO2 23  21 - 32 mmol/L Final    ANION GAP 13  4 - 13 mmol/L Final    BUN 24  5 - 25 mg/dL Final    Creatinine 0 92  0 60 - 1 30 mg/dL Final    Comment: Standardized to IDMS reference method    Glucose 191 (*) 65 - 140 mg/dL Final    Comment: If the patient is fasting, the ADA then defines impaired fasting glucose as > 100 mg/dL and diabetes as > or equal to 123 mg/dL  Specimen collection should occur prior to Sulfasalazine administration due to the potential for falsely depressed results  Specimen collection should occur prior to Sulfapyridine administration due to the potential for falsely elevated results  Calcium 8 3  8 3 - 10 1 mg/dL Final    Corrected Calcium 8 9  8 3 - 10 1 mg/dL Final    AST 27  5 - 45 U/L Final    Comment: Specimen collection should occur prior to Sulfasalazine administration due to the potential for falsely depressed results  ALT 26  12 - 78 U/L Final    Comment: Specimen collection should occur prior to Sulfasalazine administration due to the potential for falsely depressed results  Alkaline Phosphatase 82  46 - 116 U/L Final    Total Protein 7 2  6 4 - 8 2 g/dL Final    Albumin 3 2 (*) 3 5 - 5 0 g/dL Final    Total Bilirubin 0 85  0 20 - 1 00 mg/dL Final    Comment: Use of this assay is not recommended for patients undergoing treatment with eltrombopag due to the potential for falsely elevated results      eGFR 69  ml/min/1 73sq m Final    Narrative:     [place] guidelines for Chronic Kidney Disease (CKD):     Stage 1 with normal or high GFR (GFR > 90 mL/min/1 73 square meters)    Stage 2 Mild CKD (GFR = 60-89 mL/min/1 73 square meters)    Stage 3A Moderate CKD (GFR = 45-59 mL/min/1 73 square meters)    Stage 3B Moderate CKD (GFR = 30-44 mL/min/1 73 square meters)    Stage 4 Severe CKD (GFR = 15-29 mL/min/1 73 square meters)    Stage 5 End Stage CKD (GFR <15 mL/min/1 73 square meters)  Note: GFR calculation is accurate only with a steady state creatinine   LIPASE - Abnormal    Lipase 45 (*) 73 - 393 u/L Final   LACTIC ACID, PLASMA - Abnormal    LACTIC ACID 2 3 (*) 0 5 - 2 0 mmol/L Final    Narrative:     Result may be elevated if tourniquet was used during collection  UA W REFLEX TO MICROSCOPIC WITH REFLEX TO CULTURE - Abnormal    Color, UA Yellow   Final    Clarity, UA Slightly Cloudy   Final    Specific [place], UA 1 010  1 003 - 1 030 Final    pH, UA 6 5  4 5, 5 0, 5 5, 6 0, 6 5, 7 0, 7 5, 8 0 Final    Leukocytes, UA   (*) Negative Final    Value: Elevated glucose may cause decreased leukocyte values  See urine microscopic for [facility] result/    Nitrite, UA Positive (*) Negative Final    Protein, UA Negative  Negative mg/dl Final    Glucose, UA >=1000 (1%) (*) Negative mg/dl Final    Ketones, UA Trace (*) Negative mg/dl Final    Urobilinogen, UA 0 2  0 2, 1 0 E U /dl E U /dl Final    Bilirubin, UA Negative  Negative Final    Blood, UA Trace-lysed (*) Negative Final   URINE MICROSCOPIC - Abnormal    RBC, UA 1-2  None Seen, 0-1, 1-2, 2-4, 0-5 /hpf Final    WBC, UA 20-30 (*) None Seen, 0-1, 1-2, 0-5, 2-4 /hpf Final    Epithelial Cells Occasional  None Seen, Occasional /hpf Final    Bacteria, UA Moderate (*) None Seen, Occasional /hpf Final   PROTIME-INR - Normal    Protime 13 0  11 6 - 14 5 seconds Final    INR 1 04  0 84 - 1 19 Final   APTT - Normal    PTT 24  23 - 37 seconds Final    Comment: Therapeutic Heparin Range =  60-90 seconds   HS TROPONIN I 0HR - Normal    hs TnI 0hr 3  "Refer to ACS Flowchart"- see link ng/L Final    Comment:                                              Initial (time 0) result  If >=50 ng/L, Myocardial injury suggested ;  Type of myocardial injury and treatment strategy  to be determined  If 5-49 ng/L, a delta result at 2 hours and or 4 hours will be needed to further evaluate  If <4 ng/L, and chest pain has been >3 hours since onset, patient may qualify for discharge based on the HEART score in the ED    If <5 ng/L and <3hours since onset of chest pain, a delta result at 2 hours will be needed to further evaluate  HS Troponin 99th Percentile URL of a Health Population=12 ng/L with a 95% Confidence Interval of 8-18 ng/L  Second Troponin (time 2 hours)  If calculated delta >= 20 ng/L,  Myocardial injury suggested ; Type of myocardial injury and treatment strategy to be determined  If 5-49 ng/L and the calculated delta is 5-19 ng/L, consult medical service for evaluation  Continue evaluation for ischemia on ecg and other possible etiology and repeat hs troponin at 4 hours  If delta is <5 ng/L at 2 hours, consider discharge based on risk stratification via the HEART score (if in ED), or CHIDI risk score in IP/Observation  HS Troponin 99th Percentile URL of a Health Population=12 ng/L with a 95% Confidence Interval of 8-18 ng/L  LACTIC ACID 2 HOUR - Normal    LACTIC ACID 1 6  0 5 - 2 0 mmol/L Final    Narrative:     Result may be elevated if tourniquet was used during collection  BLOOD CULTURE    Blood Culture Received in Microbiology Lab  Culture in Progress  Preliminary   BLOOD CULTURE    Blood Culture Received in Microbiology Lab  Culture in Progress  Preliminary   URINE CULTURE         Final Diagnosis:  1  Urinary tract infection    2  Abdominal pain        P:  - patient presenting with ongoing vomiting abdominal pain  Given medical history will evaluate with CBC, CMP, lipase, CT abdomen pelvis  Analgesia and antiemetics  -urinary analysis consistent with urinary tract infection  Patient was prescribed medications to treat this  Return precautions discussed      Medications   sodium chloride 0 9 % bolus 1,000 mL (0 mL Intravenous Stopped 3/28/22 1359)   ondansetron (ZOFRAN) injection 4 mg (4 mg Intravenous Given 3/28/22 1219)   iohexol (OMNIPAQUE) 350 MG/ML injection (SINGLE-DOSE) 100 mL (100 mL Intravenous Given 3/28/22 1321)   metoclopramide (REGLAN) injection 10 mg (10 mg Intravenous Given 3/28/22 1406)   diphenhydrAMINE (BENADRYL) injection 25 mg (25 mg Intravenous Given 3/28/22 1406) cephalexin (KEFLEX) capsule 500 mg (500 mg Oral Given 3/28/22 1733)     Time reflects when diagnosis was documented in both MDM as applicable and the Disposition within this note     Time User Action Codes Description Comment    3/28/2022  5:20 PM Verlinda Charles T Add [R10 9] Abdominal pain     3/28/2022  5:21 PM Binstead, Maryetta Meigs T Add [N39 0] Urinary tract infection     3/28/2022  5:21 PM Verlinda Charles T Modify [R10 9] Abdominal pain     3/28/2022  5:21 PM Verlinda Charles T Modify [N39 0] Urinary tract infection       ED Disposition     ED Disposition Condition Date/Time Comment    Discharge Stable Mon Mar 28, 2022  5:20 PM Lesta Salts discharge to home/self care              Follow-up Information     Follow up With Specialties Details Why [address], DO Family Medicine Schedule an appointment as soon as possible for a visit   [address] ([address]) [place] [address]  [phone]          Discharge Medication List as of 3/28/2022  5:21 PM      START taking these medications    Details   cephalexin (KEFLEX) 500 mg capsule Take 1 capsule (500 mg total) by mouth every 6 (six) hours for 7 days, Starting Mon 3/28/2022, Until Mon 4/4/2022, Normal         CONTINUE these medications which have NOT CHANGED    Details   aspirin (ECOTRIN LOW STRENGTH) 81 mg EC tablet Take 81 mg by mouth daily, Historical Med      Dulaglutide (Trulicity) 0 47 YQ/6 9XV SOPN Inject 0 5 mL (0 75 mg total) under the skin once a week, Starting Thu 1/20/2022, Normal      Empagliflozin (Jardiance) 10 MG TABS Take 1 tablet (10 mg total) by mouth every morning, Starting Mon 2/21/2022, Normal      famotidine (PEPCID) 40 MG tablet Take 1 tablet (40 mg total) by mouth daily, Starting Fri 6/4/2021, Normal      fluticasone (FLONASE) 50 mcg/act nasal spray 2 sprays into each nostril daily, Starting Mon 4/19/2021, Normal      HYDROcodone-acetaminophen (NORCO) 5-325 mg per tablet Take 1 tablet by mouth every 6 (six) hours as needed for painMax Daily Amount: 4 tablets, Starting Wed 3/17/2021, Normal      hydroxyurea (HYDREA) 500 mg capsule TAKE 1 CAPSULE BY MOUTH ON ODD NUMBERED DAYS AND 2 CAPSULES BY MOUTH ON EVEN NUMBERED DAYS, Normal      losartan (COZAAR) 25 mg tablet Take 1 tablet (25 mg total) by mouth daily, Starting Mon 2/22/2021, Normal      meloxicam (MOBIC) 7 5 mg tablet Take 1 tablet (7 5 mg total) by mouth daily, Starting Wed 4/10/2019, Normal      !! nitrofurantoin (MACRODANTIN) 50 mg capsule Take 1 capsule (50 mg total) by mouth daily, Starting Tue 3/16/2021, Normal      !! nitrofurantoin (MACRODANTIN) 50 mg capsule Take 1 capsule (50 mg total) by mouth daily, Starting Wed 1/5/2022, Normal      ondansetron (ZOFRAN-ODT) 4 mg disintegrating tablet Take 1 tablet (4 mg total) by mouth every 6 (six) hours as needed for nausea or vomiting, Starting Mon 1/17/2022, Normal      oxybutynin (DITROPAN-XL) 10 MG 24 hr tablet Take 1 tablet (10 mg total) by mouth daily at bedtime, Starting Mon 11/8/2021, Normal      simvastatin (ZOCOR) 20 mg tablet Take 1 tablet (20 mg total) by mouth daily at bedtime, Starting Mon 11/22/2021, Normal       !! - Potential duplicate medications found  Please discuss with provider  No discharge procedures on file  Prior to Admission Medications   Prescriptions Last Dose Informant Patient Reported? Taking?    Dulaglutide (Trulicity) 6 05 GF/1 2PO SOPN   No No   Sig: Inject 0 5 mL (0 75 mg total) under the skin once a week   Empagliflozin (Jardiance) 10 MG TABS   No No   Sig: Take 1 tablet (10 mg total) by mouth every morning   HYDROcodone-acetaminophen (NORCO) 5-325 mg per tablet   No No   Sig: Take 1 tablet by mouth every 6 (six) hours as needed for painMax Daily Amount: 4 tablets   aspirin (ECOTRIN LOW STRENGTH) 81 mg EC tablet  Self Yes No   Sig: Take 81 mg by mouth daily   famotidine (PEPCID) 40 MG tablet   No No   Sig: Take 1 tablet (40 mg total) by mouth daily   fluticasone (FLONASE) 50 mcg/act nasal spray   No No   Si sprays into each nostril daily   hydroxyurea (HYDREA) 500 mg capsule   No No   Sig: TAKE 1 CAPSULE BY MOUTH ON ODD NUMBERED DAYS AND 2 CAPSULES BY MOUTH ON EVEN NUMBERED DAYS   losartan (COZAAR) 25 mg tablet   No No   Sig: Take 1 tablet (25 mg total) by mouth daily   meloxicam (MOBIC) 7 5 mg tablet  Self No No   Sig: Take 1 tablet (7 5 mg total) by mouth daily   Patient not taking: Reported on 12/10/2021    nitrofurantoin (MACRODANTIN) 50 mg capsule   No No   Sig: Take 1 capsule (50 mg total) by mouth daily   Patient not taking: Reported on 2021   nitrofurantoin (MACRODANTIN) 50 mg capsule   No No   Sig: Take 1 capsule (50 mg total) by mouth daily   ondansetron (ZOFRAN-ODT) 4 mg disintegrating tablet   No No   Sig: Take 1 tablet (4 mg total) by mouth every 6 (six) hours as needed for nausea or vomiting   oxybutynin (DITROPAN-XL) 10 MG 24 hr tablet   No No   Sig: Take 1 tablet (10 mg total) by mouth daily at bedtime   simvastatin (ZOCOR) 20 mg tablet   No No   Sig: Take 1 tablet (20 mg total) by mouth daily at bedtime      Facility-Administered Medications: None       Portions of the record may have been created with voice recognition software  Occasional wrong word or "sound a like" substitutions may have occurred due to the inherent limitations of voice recognition software  Read the chart carefully and recognize, using context, where substitutions have occurred      Electronically signed by:  MD Belinda Roy MD  22 [id number]

## 2022-03-30 LAB — BACTERIA UR CULT: ABNORMAL

## 2022-04-02 LAB
BACTERIA BLD CULT: NORMAL
BACTERIA BLD CULT: NORMAL

## 2022-04-05 ENCOUNTER — CONSULT (OUTPATIENT)
Dept: GYNECOLOGIC ONCOLOGY | Facility: CLINIC | Age: 57
End: 2022-04-05
Payer: COMMERCIAL

## 2022-04-05 VITALS
HEIGHT: 63 IN | OXYGEN SATURATION: 96 % | HEART RATE: 85 BPM | WEIGHT: 161 LBS | SYSTOLIC BLOOD PRESSURE: 110 MMHG | DIASTOLIC BLOOD PRESSURE: 62 MMHG | BODY MASS INDEX: 28.53 KG/M2

## 2022-04-05 DIAGNOSIS — N36.9 URETHRAL DISORDER: Primary | ICD-10-CM

## 2022-04-05 PROCEDURE — 99202 OFFICE O/P NEW SF 15 MIN: CPT | Performed by: OBSTETRICS & GYNECOLOGY

## 2022-04-05 NOTE — PROGRESS NOTES
Assessment/Plan:    Problem List Items Addressed This Visit        Genitourinary    Urethral disorder - Primary     Remote history of suburethral sling for urinary incontinence  Also with remote hysterectomy and bilateral salpingo-oophorectomy for benign condition  She now has a sub epithelial posterior urethral bulge/induration  This does not appear to represent a diverticulum on MRI or exam   To palpation this area is soft without discrete tumor  On MRI this appears in relation to trajectory of mid urethral sling  With this in mind I have discussed case with patient's urologist Dr Richard Gao and I have suggested the possibility of referring this patient to Urogynecology to obtain an expert opinion regarding best course of management given recurrent UTIs and what seems to represent a somewhat obstructive voiding pattern  Dr Richard Gao has agreed and we plan to refer patient to Dr Wellington Mustafa for evaluation and management  Relevant Orders    Ambulatory Referral to Urogynecology              CHIEF COMPLAINT:   Suburethral mass, concern for gynecologic origin  Previous therapy:  Oncology History   Polycythemia vera ([facility] [address] )   10/11/2017 Initial Diagnosis    Polycythemia vera ([facility] [address] )  "I'm tired all the time ' Off/on since EARLE/BSO in 2005 routine CBC showed white count of 16 8, hemoglobin 15 5, hematocrit 46 6, platelet count 076, normal differential abnormalities have been present during a few determinations dating back to   FISH for [place] chromosome was negative  SYDNEY 2 mutation was negative  2018 -  Chemotherapy      Hydrea 500 mg p o  Daily  Patient ID: Toni Hernandez is a 64 y o  female  HPI  59-year-old  with remote hysterectomy bilateral salpingo-oophorectomy  for benign condition  She also has a remote history of suburethral/mid urethral sling for the management of stress urinary incontinence    She is being actively managed for polycythemia vera for which she uses hydroxyurea ([place] chromosome negative and JAK2 mutation negative)  Since last year she has had urinary issues and has been evaluated by Urology  Dr Boles [place] took her to the operating room in March of last year for incisional biopsy of a suburethral bulge/mass  This demonstrated benign findings  Given persistence and now increase in size noted on MRI she presents for evaluation to rule out gynecologic tumor  Patient reports postvoid dribbling and sensation of incomplete voiding  Reports recurrent urinary tract infections  She is on antibiotic suppression  Denies vaginal bleeding, drainage or discharge  Reports normal bowel function  Review of Systems  As per HPI  Twelve point review of systems otherwise unremarkable    Current Outpatient Medications   Medication Sig Dispense Refill    aspirin (ECOTRIN LOW STRENGTH) 81 mg EC tablet Take 81 mg by mouth daily      Dulaglutide (Trulicity) 8 69 EK/3 7DE SOPN Inject 0 5 mL (0 75 mg total) under the skin once a week 2 mL 5    famotidine (PEPCID) 40 MG tablet Take 1 tablet (40 mg total) by mouth daily 90 tablet 3    fluticasone (FLONASE) 50 mcg/act nasal spray 2 sprays into each nostril daily 1 Bottle 5    HYDROcodone-acetaminophen (NORCO) 5-325 mg per tablet Take 1 tablet by mouth every 6 (six) hours as needed for painMax Daily Amount: 4 tablets 20 tablet 0    hydroxyurea (HYDREA) 500 mg capsule TAKE 1 CAPSULE BY MOUTH ON ODD NUMBERED DAYS AND 2 CAPSULES BY MOUTH ON EVEN NUMBERED DAYS 45 capsule 3    losartan (COZAAR) 25 mg tablet Take 1 tablet (25 mg total) by mouth daily 90 tablet 3    ondansetron (ZOFRAN-ODT) 4 mg disintegrating tablet Take 1 tablet (4 mg total) by mouth every 6 (six) hours as needed for nausea or vomiting 20 tablet 0    oxybutynin (DITROPAN-XL) 10 MG 24 hr tablet Take 1 tablet (10 mg total) by mouth daily at bedtime 30 tablet 12    simvastatin (ZOCOR) 20 mg tablet Take 1 tablet (20 mg total) by mouth daily at bedtime 90 tablet 3    sitaGLIPtin (JANUVIA) 100 mg tablet Take 100 mg by mouth daily      Empagliflozin (Jardiance) 10 MG TABS Take 1 tablet (10 mg total) by mouth every morning 90 tablet 3    meloxicam (MOBIC) 7 5 mg tablet Take 1 tablet (7 5 mg total) by mouth daily (Patient not taking: Reported on 12/10/2021 ) 30 tablet 0    nitrofurantoin (MACRODANTIN) 50 mg capsule Take 1 capsule (50 mg total) by mouth daily (Patient not taking: Reported on 7/14/2021) 30 capsule 3    nitrofurantoin (MACRODANTIN) 50 mg capsule Take 1 capsule (50 mg total) by mouth daily 30 capsule 3     No current facility-administered medications for this visit  Allergies   Allergen Reactions    Chantix [Varenicline] Other (See Comments)     Psychiatric side effects    Clomiphene Hives    Metformin      Lactic acidosis    Wellbutrin [Bupropion] Other (See Comments)     Psychiatric side effects    Latex Blisters       Past Medical History:   Diagnosis Date    Arthritis     Cancer ([facility] [address] [facility]      chronic leukemia    Chronic kidney disease     cyst in left kidney   Diabetes mellitus ([facility] [address] )     Frequent sinus infections     Pt states she has a mass in left side of sinus    GERD (gastroesophageal reflux disease)     History of echocardiogram 11/19/2008    Normal     History of nuclear stress test 11/19/2008    EF 66%, No evidence for ischemia      Hyperlipidemia     Hypertension     Missing tooth, acquired     Pt reports losing a tooth d/t chemotherapy    PONV (postoperative nausea and vomiting)     Tumor     Urethral    Wears glasses        Past Surgical History:   Procedure Laterality Date    APPENDECTOMY      BLADDER AUGMENTATION      CHOLECYSTECTOMY      COLONOSCOPY      CYSTOSCOPY  12/28/2020    HERNIA REPAIR Right     inguinal    HYSTERECTOMY      IR AORTAGRAM WITH RUN-OFF  6/14/2019    KNEE ARTHROPLASTY      KNEE ARTHROSCOPY Bilateral     IL COLONOSCOPY FLX DX W/COLLJ SPEC WHEN PFRMD N/A 1/23/2018 Procedure: COLONOSCOPY;  Surgeon: Sergio Cash DO;  Location: [facility];  Service: Gastroenterology    AR Arielle Melo 3RD+ ORD Nathaly Rosemraie [facility] 6[facility]14/2019    Procedure: ARTERIOGRAM-arteriography and possible endovascular intervention ;  Surgeon: Martine Tanner MD;  Location:  MAIN OR;  Service: Vascular    PUBOVAGINAL SLING N/A 3/17/2021    Procedure: Open transvaginal urethral biopsy;  Surgeon: Pauly Cheung MD;  Location:  MAIN OR;  Service: Urology    REDUCTION MAMMAPLASTY      SHOULDER SURGERY      WISDOM TOOTH EXTRACTION         OB History    No obstetric history on file  Family History   Problem Relation Age of Onset    Cancer Mother     Heart disease Father     Diabetes Father        The following portions of the patient's history were reviewed and updated as appropriate: allergies, current medications, past family history, past medical history, past social history, past surgical history and problem list       Objective:    Blood pressure 110/62, pulse 85, height 5' 3" (1 6 m), weight 73 kg (161 lb), SpO2 96 %  Body mass index is 28 52 kg/m²  Physical Exam  Vitals reviewed  Constitutional:       General: She is not in acute distress  Appearance: She is normal weight  She is not ill-appearing  Eyes:      General: No scleral icterus  Right eye: No discharge  Left eye: No discharge  Conjunctiva/sclera: Conjunctivae normal    Pulmonary:      Effort: Pulmonary effort is normal    Abdominal:      General: There is no distension  Palpations: Abdomen is soft  There is no mass  Tenderness: There is no abdominal tenderness  There is no guarding  Genitourinary:     Comments: Normal external female genitalia  Normal Bartholin's and [place]'s glands  Normal urethral meatus  There is a bulge in relation to posterior aspect of the mid urethra    The vaginal epithelium in this area is smooth and normal with no ulceration, nodularity or tumor involvement  Deep palpation of this bulge demonstrates soft consistency without discrete mass, tumor or nodularity  Bladder without fullness mass or tenderness  Vagina without lesion or discharge  Stage 1-2 anterior compartment pelvic organ prolapse noted  Cervix and uterus are surgically absent  Bimanual exam demonstrates no evidence of pelvic masses  Anus without fissure of lesion  Musculoskeletal:      Right lower leg: No edema  Left lower leg: No edema  Neurological:      Mental Status: She is alert         Gabe Doan MD, [address] [place] [address]  4/5/2022  2:49 PM

## 2022-04-05 NOTE — ASSESSMENT & PLAN NOTE
Remote history of suburethral sling for urinary incontinence  Also with remote hysterectomy and bilateral salpingo-oophorectomy for benign condition  She now has a sub epithelial posterior urethral bulge/induration  This does not appear to represent a diverticulum on MRI or exam   To palpation this area is soft without discrete tumor  On MRI this appears in relation to trajectory of mid urethral sling  With this in mind I have discussed case with patient's urologist Dr Christiano Garcia and I have suggested the possibility of referring this patient to Urogynecology to obtain an expert opinion regarding best course of management given recurrent UTIs and what seems to represent a somewhat obstructive voiding pattern  Dr Christiano Garcia has agreed and we plan to refer patient to Dr Tiffanie Koo for evaluation and management

## 2022-04-05 NOTE — PATIENT INSTRUCTIONS
Keep appointment for evaluation by Urogynecology  Keep follow-up with Dr Michell Herbert and your PCP

## 2022-04-07 ENCOUNTER — OFFICE VISIT (OUTPATIENT)
Dept: FAMILY MEDICINE CLINIC | Facility: CLINIC | Age: 57
End: 2022-04-07
Payer: COMMERCIAL

## 2022-04-07 VITALS
TEMPERATURE: 98.4 F | WEIGHT: 164.8 LBS | OXYGEN SATURATION: 96 % | HEIGHT: 63 IN | DIASTOLIC BLOOD PRESSURE: 76 MMHG | SYSTOLIC BLOOD PRESSURE: 136 MMHG | HEART RATE: 97 BPM | BODY MASS INDEX: 29.2 KG/M2

## 2022-04-07 DIAGNOSIS — J01.00 ACUTE NON-RECURRENT MAXILLARY SINUSITIS: Primary | ICD-10-CM

## 2022-04-07 PROCEDURE — 99214 OFFICE O/P EST MOD 30 MIN: CPT | Performed by: PHYSICIAN ASSISTANT

## 2022-04-07 PROCEDURE — 3078F DIAST BP <80 MM HG: CPT | Performed by: PHYSICIAN ASSISTANT

## 2022-04-07 PROCEDURE — 3075F SYST BP GE 130 - 139MM HG: CPT | Performed by: PHYSICIAN ASSISTANT

## 2022-04-07 RX ORDER — AMOXICILLIN AND CLAVULANATE POTASSIUM 875; 125 MG/1; MG/1
1 TABLET, FILM COATED ORAL EVERY 12 HOURS SCHEDULED
Qty: 14 TABLET | Refills: 0 | Status: SHIPPED | OUTPATIENT
Start: 2022-04-07 | End: 2022-04-14

## 2022-04-07 NOTE — PROGRESS NOTES
Assessment/Plan:    Problem List Items Addressed This Visit     None      Visit Diagnoses     Acute non-recurrent maxillary sinusitis    -  Primary    Relevant Medications    amoxicillin-clavulanate (AUGMENTIN) 875-125 mg per tablet           Diagnoses and all orders for this visit:    Acute non-recurrent maxillary sinusitis  -     amoxicillin-clavulanate (AUGMENTIN) 875-125 mg per tablet; Take 1 tablet by mouth every 12 (twelve) hours for 7 days        Script for Augmentin  Recommended she follow up with her urogynecologist as scheduled  Refused A1C, foot exam, and eye exam  She will schedule diabetic visit when feeling better    Subjective:      Patient ID: Jaron Mcpherson is a 64 y o  female  Harmeet Winslow is a 64year old female who is here today for a follow-up from the ER  She went to the ER with vomiting, abdominal pain, and URI symptoms  She was told that she had a UTI and discharged home on Keflex  She did not have any UTI symptoms, but is under the treatment of her urologist and gynecologist for chronic UTIs  The vomiting and abdominal pain improved, but she still complains of sinus pain, sinus pressure, purulent drainage, cough, and headache  She has been taking OTC Advil Cold and Sinus, Sudafed, and DayQuil with little relief  She denies any fevers, chills, chest pains, shortness of breath, nausea, vomiting, or diarrhea  The following portions of the patient's history were reviewed and updated as appropriate:   She has a past medical history of Arthritis, Cancer ([facility] [address] Chronic kidney disease, Diabetes mellitus ([facility] [address] ), Frequent sinus infections, GERD (gastroesophageal reflux disease), History of echocardiogram (11/19/2008), History of nuclear stress test (11/19/2008), Hyperlipidemia, Hypertension, Missing tooth, acquired, PONV (postoperative nausea and vomiting), Tumor, and Wears glasses  ,  does not have any pertinent problems on file  ,   has a past surgical history that includes Hysterectomy; Appendectomy; Cholecystectomy; Bladder augmentation; [place] tooth extraction; Knee Arthroplasty; Knee arthroscopy (Bilateral); Shoulder surgery; pr colonoscopy flx dx w/collj spec when pfrmd (N/A, 1/23/2018); Colonoscopy; pr slctv cathj 3rd+ ord slctv abdl pel/lxtr brnch (Left, 6/14/2019); IR aortagram with run-off (6/14/2019); Cystoscopy (12/28/2020); Reduction mammaplasty; Hernia repair (Right); and Pubovaginal sling (N/A, 3/17/2021)  ,  family history includes Cancer in her mother; Diabetes in her father; Heart disease in her father  ,   reports that she has been smoking cigarettes  She has been smoking about 0 25 packs per day  She has never used smokeless tobacco  She reports previous alcohol use  She reports that she does not use drugs  ,  is allergic to chantix [varenicline], clomiphene, metformin, wellbutrin [bupropion], and latex     Current Outpatient Medications   Medication Sig Dispense Refill    aspirin (ECOTRIN LOW STRENGTH) 81 mg EC tablet Take 81 mg by mouth daily      Dulaglutide (Trulicity) 8 41 W[place]/0 8NA SOPN Inject 0 5 mL (0 75 mg total) under the skin once a week 2 mL 5    Empagliflozin (Jardiance) 10 MG TABS Take 1 tablet (10 mg total) by mouth every morning 90 tablet 3    famotidine (PEPCID) 40 MG tablet Take 1 tablet (40 mg total) by mouth daily 90 tablet 3    fluticasone (FLONASE) 50 mcg/act nasal spray 2 sprays into each nostril daily 1 Bottle 5    HYDROcodone-acetaminophen (NORCO) 5-325 mg per tablet Take 1 tablet by mouth every 6 (six) hours as needed for painMax Daily Amount: 4 tablets 20 tablet 0    hydroxyurea (HYDREA) 500 mg capsule TAKE 1 CAPSULE BY MOUTH ON ODD NUMBERED DAYS AND 2 CAPSULES BY MOUTH ON EVEN NUMBERED DAYS 45 capsule 3    losartan (COZAAR) 25 mg tablet Take 1 tablet (25 mg total) by mouth daily 90 tablet 3    ondansetron (ZOFRAN-ODT) 4 mg disintegrating tablet Take 1 tablet (4 mg total) by mouth every 6 (six) hours as needed for nausea or vomiting 20 tablet 0    oxybutynin (DITROPAN-XL) 10 MG 24 hr tablet Take 1 tablet (10 mg total) by mouth daily at bedtime 30 tablet 12    simvastatin (ZOCOR) 20 mg tablet Take 1 tablet (20 mg total) by mouth daily at bedtime 90 tablet 3    sitaGLIPtin (JANUVIA) 100 mg tablet Take 100 mg by mouth daily      amoxicillin-clavulanate (AUGMENTIN) 875-125 mg per tablet Take 1 tablet by mouth every 12 (twelve) hours for 7 days 14 tablet 0    meloxicam (MOBIC) 7 5 mg tablet Take 1 tablet (7 5 mg total) by mouth daily (Patient not taking: Reported on 12/10/2021 ) 30 tablet 0    nitrofurantoin (MACRODANTIN) 50 mg capsule Take 1 capsule (50 mg total) by mouth daily (Patient not taking: Reported on 7/14/2021) 30 capsule 3    nitrofurantoin (MACRODANTIN) 50 mg capsule Take 1 capsule (50 mg total) by mouth daily (Patient not taking: Reported on 4/7/2022 ) 30 capsule 3     No current facility-administered medications for this visit  Review of Systems   Constitutional: Negative for chills, diaphoresis, fatigue and fever  HENT: Positive for congestion, postnasal drip, rhinorrhea, sinus pressure and sinus pain  Negative for ear pain, sneezing, sore throat and trouble swallowing  Eyes: Negative for pain and visual disturbance  Respiratory: Positive for cough and wheezing  Negative for apnea and shortness of breath  Cardiovascular: Negative for chest pain and palpitations  Gastrointestinal: Negative for abdominal pain, constipation, diarrhea, nausea and vomiting  Genitourinary: Negative for dysuria and hematuria  Musculoskeletal: Negative for arthralgias, gait problem and myalgias  Neurological: Positive for headaches  Negative for dizziness, syncope, weakness, light-headedness and numbness  Psychiatric/Behavioral: Negative for suicidal ideas  The patient is not nervous/anxious            Objective:  Vitals:    04/07/22 1431   BP: 136/76   Pulse: 97   Temp: 98 4 °F (36 9 °C)   SpO2: 96%   Weight: 74 8 kg (164 lb 12 8 oz) Height: 5' 3" (1 6 m)     Body mass index is 29 19 kg/m²  Physical Exam  Vitals and nursing note reviewed  Constitutional:       Appearance: She is well-developed  HENT:      Head: Normocephalic and atraumatic  Right Ear: Tympanic membrane, ear canal and external ear normal       Left Ear: Tympanic membrane, ear canal and external ear normal       Nose: Mucosal edema, congestion and rhinorrhea present  Rhinorrhea is purulent  Right Turbinates: Enlarged  Left Turbinates: Enlarged  Right Sinus: Maxillary sinus tenderness present  Left Sinus: Maxillary sinus tenderness present  Mouth/Throat:      Pharynx: No oropharyngeal exudate or posterior oropharyngeal erythema  Eyes:      Pupils: Pupils are equal, round, and reactive to light  Cardiovascular:      Rate and Rhythm: Normal rate and regular rhythm  Heart sounds: Normal heart sounds  No murmur heard  No friction rub  No gallop  Pulmonary:      Effort: Pulmonary effort is normal  No respiratory distress  Breath sounds: Wheezing (scattered post-expiratory) present  No rales  Musculoskeletal:         General: Normal range of motion  Cervical back: Normal range of motion and neck supple  Lymphadenopathy:      Cervical: No cervical adenopathy  Skin:     General: Skin is warm and dry  Neurological:      Mental Status: She is alert and oriented to person, place, and time  Psychiatric:         Behavior: Behavior normal          Thought Content:  Thought content normal          Judgment: Judgment normal

## 2022-04-26 ENCOUNTER — TELEPHONE (OUTPATIENT)
Dept: FAMILY MEDICINE CLINIC | Facility: CLINIC | Age: 57
End: 2022-04-26

## 2022-04-26 NOTE — TELEPHONE ENCOUNTER
Pt aware  She is stating that she has been taking OTC allergy medication (claritin, allerga, benadryl) and decongestant  She is getting no relief  The Augmentin was helping but did not fully "kick it"  She feels like she is back to the same place where she was a week ago  She is refusing an appointment  Offered multiple times  She wanted me to send you another message

## 2022-04-26 NOTE — TELEPHONE ENCOUNTER
I would recommend trying an OTC allergy medication and decongestant to see if this provides relief  The Augmentin should have provided relief if it was a bacterial sinus infection  She can also try a nasal saline spray to cleanse sinuses  If that does not help, she should schedule another appointment

## 2022-04-26 NOTE — TELEPHONE ENCOUNTER
Patient states she still has sinus infection from the last time she was here  Said she needs another antibiotic but for longer this time  Please advise

## 2022-04-28 DIAGNOSIS — E11.9 TYPE 2 DIABETES MELLITUS WITHOUT COMPLICATION, WITHOUT LONG-TERM CURRENT USE OF INSULIN (HCC): ICD-10-CM

## 2022-04-28 DIAGNOSIS — J30.1 SEASONAL ALLERGIC RHINITIS DUE TO POLLEN: ICD-10-CM

## 2022-04-28 RX ORDER — LOSARTAN POTASSIUM 25 MG/1
25 TABLET ORAL DAILY
Qty: 90 TABLET | Refills: 3 | Status: SHIPPED | OUTPATIENT
Start: 2022-04-28

## 2022-04-28 RX ORDER — FLUTICASONE PROPIONATE 50 MCG
2 SPRAY, SUSPENSION (ML) NASAL DAILY
Qty: 16 G | Refills: 1 | Status: SHIPPED | OUTPATIENT
Start: 2022-04-28

## 2022-05-12 DIAGNOSIS — Z29.8 NEED FOR PROPHYLAXIS AGAINST URINARY TRACT INFECTION: ICD-10-CM

## 2022-05-13 DIAGNOSIS — Z29.8 NEED FOR PROPHYLAXIS AGAINST URINARY TRACT INFECTION: ICD-10-CM

## 2022-05-13 RX ORDER — NITROFURANTOIN MACROCRYSTALS 50 MG/1
50 CAPSULE ORAL DAILY
Qty: 30 CAPSULE | Refills: 0 | Status: SHIPPED | OUTPATIENT
Start: 2022-05-13 | End: 2022-05-26

## 2022-05-13 RX ORDER — NITROFURANTOIN MACROCRYSTALS 50 MG/1
50 CAPSULE ORAL DAILY
Qty: 30 CAPSULE | Refills: 3 | Status: SHIPPED | OUTPATIENT
Start: 2022-05-13 | End: 2022-05-26

## 2022-05-23 ENCOUNTER — VBI (OUTPATIENT)
Dept: ADMINISTRATIVE | Facility: OTHER | Age: 57
End: 2022-05-23

## 2022-05-26 ENCOUNTER — OFFICE VISIT (OUTPATIENT)
Dept: URGENT CARE | Facility: CLINIC | Age: 57
End: 2022-05-26
Payer: COMMERCIAL

## 2022-05-26 VITALS
HEART RATE: 90 BPM | TEMPERATURE: 97.3 F | OXYGEN SATURATION: 98 % | SYSTOLIC BLOOD PRESSURE: 131 MMHG | RESPIRATION RATE: 20 BRPM | DIASTOLIC BLOOD PRESSURE: 60 MMHG

## 2022-05-26 DIAGNOSIS — J01.41 ACUTE RECURRENT PANSINUSITIS: Primary | ICD-10-CM

## 2022-05-26 PROCEDURE — 99213 OFFICE O/P EST LOW 20 MIN: CPT

## 2022-05-26 RX ORDER — DOXYCYCLINE HYCLATE 100 MG/1
100 CAPSULE ORAL EVERY 12 HOURS SCHEDULED
Qty: 20 CAPSULE | Refills: 0 | Status: SHIPPED | OUTPATIENT
Start: 2022-05-26 | End: 2022-06-05

## 2022-05-26 NOTE — PROGRESS NOTES
[facility]        NAME: Yoana Coppola is a 64 y o  female  : 1965    MRN: [id number]  DATE: May 26, 2022  TIME: 5:06 PM    Assessment and Plan   Acute recurrent pansinusitis [J01 41]  1  Acute recurrent pansinusitis  doxycycline hyclate (VIBRAMYCIN) 100 mg capsule     Given recent treatment of sinusitis with Augmentin in the past 30 days will change antibiotic to doxycycline  Did run medication check which doxycycline offered no interactions  Given patient's suppressed immune system will treat for 10 days  Advised patient to follow-up with her ENT doctor in regards to lesion in the left sinus cavity  Patient Instructions   Take doxycycline as directed  Avoid calcium agents with 2 hours of the medication  While taking this medication use sunblock or wear long sleeves as it may cause a severe sunburn  Continue current regimen  If symptoms are not improving proceed to ENT in regard to your lesion in the left sinus cavity that you are aware of  Push fluids  Follow up with PCP in 3-5 days  Proceed to  ER if symptoms worsen  Chief Complaint     Chief Complaint   Patient presents with    possible sinus infection     Using Nasonex  Symptoms for a few weeks  History of Present Illness       Patient is a 64year old female with a hx of CLL and polycythemia vera who presents to the office today for a sinus infection for about 5 weeks  Has tatiana using nasonex and vicks and benadryl and claritin D at home without relief  She has been on augmentin for 7 days in the past with almost resolution  Does admit to fevers occasionally with the congestion  Also has headache, nausea, and diarrhea  Denies vomiting, abdominal pain, chest pain, sob, myalgia  Denies sick contacts  Does admit to smoking about 1 pack in a week or two weeks    Patient does state that about a year ago she saw ENT and was told she has a lesion in the left sinus cavity which he would like to remove but patient has not followed through with this yet as she has multiple other procedures to have done 1st       Review of Systems   Review of Systems   Constitutional: Negative for chills, fatigue and fever  HENT: Positive for congestion, facial swelling, postnasal drip, rhinorrhea, sinus pressure and sinus pain  Negative for ear pain  Respiratory: Negative for cough, shortness of breath and wheezing  Cardiovascular: Negative for chest pain and palpitations  Gastrointestinal: Positive for diarrhea and nausea  Negative for vomiting  Musculoskeletal: Negative for myalgias  Neurological: Positive for headaches  All other systems reviewed and are negative          Current Medications       Current Outpatient Medications:     aspirin (ECOTRIN LOW STRENGTH) 81 mg EC tablet, Take 81 mg by mouth daily, Disp: , Rfl:     doxycycline hyclate (VIBRAMYCIN) 100 mg capsule, Take 1 capsule (100 mg total) by mouth every 12 (twelve) hours for 10 days, Disp: 20 capsule, Rfl: 0    Dulaglutide (Trulicity) 4 67 XL/3 3SR SOPN, Inject 0 5 mL (0 75 mg total) under the skin once a week, Disp: 2 mL, Rfl: 5    Empagliflozin (Jardiance) 10 MG TABS, Take 1 tablet (10 mg total) by mouth every morning, Disp: 90 tablet, Rfl: 3    famotidine (PEPCID) 40 MG tablet, Take 1 tablet (40 mg total) by mouth daily, Disp: 90 tablet, Rfl: 3    fluticasone (FLONASE) 50 mcg/act nasal spray, 2 sprays into each nostril daily, Disp: 16 g, Rfl: 1    hydroxyurea (HYDREA) 500 mg capsule, TAKE 1 CAPSULE BY MOUTH ON ODD NUMBERED DAYS AND 2 CAPSULES BY MOUTH ON EVEN NUMBERED DAYS, Disp: 45 capsule, Rfl: 3    losartan (COZAAR) 25 mg tablet, Take 1 tablet (25 mg total) by mouth daily, Disp: 90 tablet, Rfl: 3    nitrofurantoin (MACRODANTIN) 50 mg capsule, Take 1 capsule (50 mg total) by mouth daily, Disp: 30 capsule, Rfl: 3    ondansetron (ZOFRAN-ODT) 4 mg disintegrating tablet, Take 1 tablet (4 mg total) by mouth every 6 (six) hours as needed for nausea or vomiting, Disp: 20 tablet, Rfl: 0    oxybutynin (DITROPAN-XL) 10 MG 24 hr tablet, Take 1 tablet (10 mg total) by mouth daily at bedtime, Disp: 30 tablet, Rfl: 12    simvastatin (ZOCOR) 20 mg tablet, Take 1 tablet (20 mg total) by mouth daily at bedtime, Disp: 90 tablet, Rfl: 3    Current Allergies     Allergies as of 05/26/2022 - Reviewed 05/26/2022   Allergen Reaction Noted    Chantix [varenicline] Other (See Comments) 03/16/2019    Clomiphene Hives 05/30/2013    Metformin  03/26/2019    Wellbutrin [bupropion] Other (See Comments) 03/16/2019    Latex Blisters 11/01/2017            The following portions of the patient's history were reviewed and updated as appropriate: allergies, current medications, past family history, past medical history, past social history, past surgical history and problem list      Past Medical History:   Diagnosis Date    Arthritis     Cancer ([facility] [address] )      chronic leukemia    Chronic kidney disease     cyst in left kidney   Diabetes mellitus ([facility] )     Frequent sinus infections     Pt states she has a mass in left side of sinus    GERD (gastroesophageal reflux disease)     History of echocardiogram 11/19/2008    Normal     History of nuclear stress test 11/19/2008    EF 66%, No evidence for ischemia      Hyperlipidemia     Hypertension     Missing tooth, acquired     Pt reports losing a tooth d/t chemotherapy    PONV (postoperative nausea and vomiting)     Tumor     Urethral    Wears glasses        Past Surgical History:   Procedure Laterality Date    APPENDECTOMY      BLADDER AUGMENTATION      CHOLECYSTECTOMY      COLONOSCOPY      CYSTOSCOPY  12/28/2020    HERNIA REPAIR Right     inguinal    HYSTERECTOMY      IR AORTAGRAM WITH RUN-OFF  6/14/2019    KNEE ARTHROPLASTY      KNEE ARTHROSCOPY Bilateral     IL COLONOSCOPY FLX DX W/COLLJ SPEC WHEN PFRMD N/A 1/23/2018    Procedure: COLONOSCOPY;  Surgeon: Katie Dubois DO;  Location: [facility] MA[facility];  Service: Gastroenterology   [facility] [place] SLCTV CATHJ 3RD+ ORD SLCTV ABDL PEL/LXTR [facility] Left 6/14/2019    Procedure: ARTERIOGRAM-arteriography and possible endovascular intervention ;  Surgeon: Kassandra Nicole MD;  Location:  MAIN OR;  Service: Vascular    PUBOVAGINAL SLING N/A 3/17/2021    Procedure: Open transvaginal urethral biopsy;  Surgeon: Walter Maldonado MD;  Location:  MAIN OR;  Service: Urology    REDUCTION MAMMAPLASTY      SHOULDER SURGERY      WISDOM TOOTH EXTRACTION         Family History   Problem Relation Age of Onset    Cancer Mother     Heart disease Father     Diabetes Father          Medications have been verified  Objective   /60   Pulse 90   Temp (!) 97 3 °F (36 3 °C)   Resp 20   SpO2 98%        Physical Exam     Physical Exam  Vitals and nursing note reviewed  Constitutional:       General: She is not in acute distress  Appearance: Normal appearance  She is normal weight  She is not toxic-appearing  HENT:      Right Ear: Hearing, ear canal and external ear normal  A middle ear effusion (Serous) is present  Tympanic membrane is not erythematous, retracted or bulging  Left Ear: Hearing, ear canal and external ear normal  A middle ear effusion ( serous) is present  Tympanic membrane is not erythematous, retracted or bulging  Nose: Congestion present  Right Turbinates: Enlarged  Left Turbinates: Enlarged  Right Sinus: Frontal sinus tenderness present  Left Sinus: Maxillary sinus tenderness and frontal sinus tenderness present  Mouth/Throat:      Pharynx: Uvula midline  Posterior oropharyngeal erythema present  Comments: Posterior pharynx erythema and postnasal drip noted  Cardiovascular:      Rate and Rhythm: Normal rate and regular rhythm  Pulses: Normal pulses  Heart sounds: Normal heart sounds  No murmur heard  No friction rub  No gallop  Pulmonary:      Effort: Pulmonary effort is normal  No respiratory distress        Breath sounds: Normal breath sounds  No stridor  No wheezing, rhonchi or rales  Lymphadenopathy:      Cervical: No cervical adenopathy  Skin:     General: Skin is warm and dry  Capillary Refill: Capillary refill takes less than 2 seconds  Coloration: Skin is not jaundiced or pale  Findings: No rash  Neurological:      General: No focal deficit present  Mental Status: She is alert and oriented to person, place, and time

## 2022-05-26 NOTE — PATIENT INSTRUCTIONS
Take doxycycline as directed  Avoid calcium agents with 2 hours of the medication  While taking this medication use sunblock or wear long sleeves as it may cause a severe sunburn  Continue current regimen  If symptoms are not improving proceed to ENT in regard to your lesion in the left sinus cavity that you are aware of  Push fluids

## 2022-06-07 ENCOUNTER — TELEPHONE (OUTPATIENT)
Dept: HEMATOLOGY ONCOLOGY | Facility: CLINIC | Age: 57
End: 2022-06-07

## 2022-06-07 DIAGNOSIS — K29.00 ACUTE GASTRITIS WITHOUT HEMORRHAGE, UNSPECIFIED GASTRITIS TYPE: ICD-10-CM

## 2022-06-07 DIAGNOSIS — Z29.8 NEED FOR PROPHYLAXIS AGAINST URINARY TRACT INFECTION: ICD-10-CM

## 2022-06-07 RX ORDER — FAMOTIDINE 40 MG/1
40 TABLET, FILM COATED ORAL DAILY
Qty: 90 TABLET | Refills: 3 | Status: SHIPPED | OUTPATIENT
Start: 2022-06-07

## 2022-06-07 RX ORDER — NITROFURANTOIN MACROCRYSTALS 50 MG/1
50 CAPSULE ORAL DAILY
Qty: 30 CAPSULE | Refills: 3 | Status: SHIPPED | OUTPATIENT
Start: 2022-06-07 | End: 2022-06-28 | Stop reason: SDUPTHER

## 2022-06-07 NOTE — TELEPHONE ENCOUNTER
Medication Refill     Who is Calling  Patient   Medication nitrofurantoin (MACRODANTIN)     How many pills left     Preferred Pharmacy / [address] [place] [address], [place] - [address] [place] [address], [address] [facility]    Who is your Physician? [facility]   Call back number [phone]   Relevant Information

## 2022-06-27 DIAGNOSIS — E11.65 TYPE 2 DIABETES MELLITUS WITH HYPERGLYCEMIA, WITHOUT LONG-TERM CURRENT USE OF INSULIN (HCC): Primary | ICD-10-CM

## 2022-06-28 ENCOUNTER — TELEPHONE (OUTPATIENT)
Dept: HEMATOLOGY ONCOLOGY | Facility: CLINIC | Age: 57
End: 2022-06-28

## 2022-06-28 DIAGNOSIS — Z29.8 NEED FOR PROPHYLAXIS AGAINST URINARY TRACT INFECTION: ICD-10-CM

## 2022-06-28 RX ORDER — NITROFURANTOIN MACROCRYSTALS 50 MG/1
50 CAPSULE ORAL DAILY
Qty: 30 CAPSULE | Refills: 3 | Status: SHIPPED | OUTPATIENT
Start: 2022-06-28 | End: 2022-07-12 | Stop reason: SDUPTHER

## 2022-06-28 NOTE — TELEPHONE ENCOUNTER
Medication Refill     Who is Calling  Patient   Medication nitrofurantoin (MACRODANTIN)      How many pills left     Preferred Pharmacy / [facility]  [place], [place] - [address]    Who is your Physician? Angie Prater   Call back number [phone]   Relevant Information         Patient is also requesting for new lab orders to be placed

## 2022-06-29 DIAGNOSIS — D45 POLYCYTHEMIA VERA (HCC): Primary | ICD-10-CM

## 2022-06-30 ENCOUNTER — TELEPHONE (OUTPATIENT)
Dept: GASTROENTEROLOGY | Facility: CLINIC | Age: 57
End: 2022-06-30

## 2022-06-30 ENCOUNTER — APPOINTMENT (OUTPATIENT)
Dept: LAB | Facility: MEDICAL CENTER | Age: 57
End: 2022-06-30
Payer: COMMERCIAL

## 2022-06-30 ENCOUNTER — OFFICE VISIT (OUTPATIENT)
Dept: UROLOGY | Facility: CLINIC | Age: 57
End: 2022-06-30
Payer: COMMERCIAL

## 2022-06-30 VITALS
HEART RATE: 74 BPM | SYSTOLIC BLOOD PRESSURE: 110 MMHG | DIASTOLIC BLOOD PRESSURE: 82 MMHG | BODY MASS INDEX: 27.82 KG/M2 | HEIGHT: 63 IN | WEIGHT: 157 LBS

## 2022-06-30 DIAGNOSIS — R10.2 PELVIC PAIN: ICD-10-CM

## 2022-06-30 DIAGNOSIS — D45 POLYCYTHEMIA VERA (HCC): ICD-10-CM

## 2022-06-30 DIAGNOSIS — D45 POLYCYTHEMIA VERA (HCC): Primary | ICD-10-CM

## 2022-06-30 DIAGNOSIS — N89.8 VAGINAL MASS: Primary | ICD-10-CM

## 2022-06-30 DIAGNOSIS — N89.8 VAGINAL MASS: ICD-10-CM

## 2022-06-30 LAB
ANION GAP SERPL CALCULATED.3IONS-SCNC: 4 MMOL/L (ref 4–13)
BASOPHILS # BLD AUTO: 0.09 THOUSANDS/ΜL (ref 0–0.1)
BASOPHILS NFR BLD AUTO: 1 % (ref 0–1)
BUN SERPL-MCNC: 13 MG/DL (ref 5–25)
CALCIUM SERPL-MCNC: 9.2 MG/DL (ref 8.3–10.1)
CHLORIDE SERPL-SCNC: 105 MMOL/L (ref 100–108)
CO2 SERPL-SCNC: 29 MMOL/L (ref 21–32)
CREAT SERPL-MCNC: 0.86 MG/DL (ref 0.6–1.3)
EOSINOPHIL # BLD AUTO: 0.2 THOUSAND/ΜL (ref 0–0.61)
EOSINOPHIL NFR BLD AUTO: 2 % (ref 0–6)
ERYTHROCYTE [DISTWIDTH] IN BLOOD BY AUTOMATED COUNT: 14.1 % (ref 11.6–15.1)
GFR SERPL CREATININE-BSD FRML MDRD: 75 ML/MIN/1.73SQ M
GLUCOSE P FAST SERPL-MCNC: 128 MG/DL (ref 65–99)
HCT VFR BLD AUTO: 47 % (ref 34.8–46.1)
HGB BLD-MCNC: 15.7 G/DL (ref 11.5–15.4)
IMM GRANULOCYTES # BLD AUTO: 0.04 THOUSAND/UL (ref 0–0.2)
IMM GRANULOCYTES NFR BLD AUTO: 0 % (ref 0–2)
LYMPHOCYTES # BLD AUTO: 3.09 THOUSANDS/ΜL (ref 0.6–4.47)
LYMPHOCYTES NFR BLD AUTO: 32 % (ref 14–44)
MCH RBC QN AUTO: 38.7 PG (ref 26.8–34.3)
MCHC RBC AUTO-ENTMCNC: 33.4 G/DL (ref 31.4–37.4)
MCV RBC AUTO: 116 FL (ref 82–98)
MONOCYTES # BLD AUTO: 0.52 THOUSAND/ΜL (ref 0.17–1.22)
MONOCYTES NFR BLD AUTO: 5 % (ref 4–12)
NEUTROPHILS # BLD AUTO: 5.65 THOUSANDS/ΜL (ref 1.85–7.62)
NEUTS SEG NFR BLD AUTO: 60 % (ref 43–75)
NRBC BLD AUTO-RTO: 0 /100 WBCS
PLATELET # BLD AUTO: 319 THOUSANDS/UL (ref 149–390)
PMV BLD AUTO: 9.4 FL (ref 8.9–12.7)
POTASSIUM SERPL-SCNC: 4.3 MMOL/L (ref 3.5–5.3)
RBC # BLD AUTO: 4.06 MILLION/UL (ref 3.81–5.12)
SODIUM SERPL-SCNC: 138 MMOL/L (ref 136–145)
WBC # BLD AUTO: 9.59 THOUSAND/UL (ref 4.31–10.16)

## 2022-06-30 PROCEDURE — 36415 COLL VENOUS BLD VENIPUNCTURE: CPT

## 2022-06-30 PROCEDURE — 80048 BASIC METABOLIC PNL TOTAL CA: CPT

## 2022-06-30 PROCEDURE — 3074F SYST BP LT 130 MM HG: CPT | Performed by: UROLOGY

## 2022-06-30 PROCEDURE — 3079F DIAST BP 80-89 MM HG: CPT | Performed by: UROLOGY

## 2022-06-30 PROCEDURE — 85025 COMPLETE CBC W/AUTO DIFF WBC: CPT

## 2022-06-30 PROCEDURE — 99214 OFFICE O/P EST MOD 30 MIN: CPT | Performed by: UROLOGY

## 2022-06-30 RX ORDER — TRAMADOL HYDROCHLORIDE 50 MG/1
50 TABLET ORAL EVERY 6 HOURS PRN
Qty: 30 TABLET | Refills: 0 | Status: SHIPPED | OUTPATIENT
Start: 2022-06-30

## 2022-06-30 NOTE — H&P
[address] [facility]  [facility]  [address] [facility]  [place], [address] 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  We discussed removal of this  I believe she may benefit from debulking this and sparing the urethra  There is a chance of recurrence giving that this is a keloid type tumor and she is aware of this  The risks of bleeding infection vesicle vaginal fistula or urethral vaginal fistula, recurrence have been explained  We will obtain vascular surgery consultation and Cardiology consultation for preoperative clearance beforehand  Chief Complaint     Chief Complaint   Patient presents with    Follow-up       History of Present Illness   Periurethral mass-status post open biopsy by me which formed after mid urethral sling placement elsewhere years ago  This was biopsied by me 3/17/2021 which was a wedge biopsy and pathology showed no malignancy, just fibrovascular smooth muscle proliferation consistent with angiofibroma  I saw her 2021 and there was persistently large mass, proximally 2 cm involving the urethra  CT scan 3/28/2022 shows urethral mass present, unchanged in appearance since prior studies  She has seen Gynecologic Oncology  The pain is pretty significant  She also is having more difficulty urinating  She has to lean forward to get her stream going  She is having urinary frequency, 12 13 times in a 4 hour  She sleeps through the night because she takes Excedrin and Benadryl at night  No gross hematuria slight dysuria      She has significant vascular disease history with the stent in her left iliac artery  She will need preoperative vascular surgery clearance as well as cardiology clearance      The following portions of the patient's history were reviewed and updated as appropriate: allergies, current medications, past family history, past medical history, past social history, past surgical history and problem list   Past Medical History:   Diagnosis Date    Arthritis     Cancer ([facility] [address] )      chronic leukemia    Chronic kidney disease     cyst in left kidney   Diabetes mellitus ([facility] [address] )     Frequent sinus infections     Pt states she has a mass in left side of sinus    GERD (gastroesophageal reflux disease)     History of echocardiogram 11/19/2008    Normal     History of nuclear stress test 11/19/2008    EF 66%, No evidence for ischemia   Hyperlipidemia     Hypertension     Missing tooth, acquired     Pt reports losing a tooth d/t chemotherapy    PONV (postoperative nausea and vomiting)     Tumor     Urethral    Wears glasses      Past Surgical History:   Procedure Laterality Date    APPENDECTOMY      BLADDER AUGMENTATION      CHOLECYSTECTOMY      COLONOSCOPY      CYSTOSCOPY  12/28/2020    HERNIA REPAIR Right     inguinal    HYSTERECTOMY      IR AORTAGRAM WITH RUN-OFF  6/14/2019    KNEE ARTHROPLASTY      KNEE ARTHROSCOPY Bilateral     DC COLONOSCOPY FLX DX W/COLLJ SPEC WHEN PFRMD N/A 1/23/2018    Procedure: COLONOSCOPY;  Surgeon: Aixa Gonzalez DO;  Location: [facility] MA[facility];  Service: Gastroenterology    [place] 3RD+ ORD SLCTV ABDL PEL/LXTR [facility] Left 6/14/2019    Procedure: ARTERIOGRAM-arteriography and possible endovascular intervention ;  Surgeon: Rosemary Austin MD;  Location:  MAIN OR;  Service: Vascular    PUBOVAGINAL SLING N/A 3/17/2021    Procedure: Open transvaginal urethral biopsy;  Surgeon: Jesus Kerr MD;  Location:  MAIN OR;  Service: Urology    REDUCTION MAMMAPLASTY      SHOULDER SURGERY      WISDOM TOOTH EXTRACTION       shoulder  Review of Systems   Review of Systems   Constitutional: Negative for fever  Respiratory: Negative for shortness of breath  Cardiovascular: Negative for chest pain  Gastrointestinal: Negative  Genitourinary: Positive for difficulty urinating, dysuria, frequency, pelvic pain, urgency and vaginal pain  Negative for hematuria  Musculoskeletal: Negative for back pain  Active Problem List     Patient Active Problem List   Diagnosis    [facility]cythemia vera ([facility] [address] )    Type 2 diabetes mellitus with hyperglycemia, without long-term current use of insulin ([facility] [address] )    Acute cystitis without hematuria    Lactic acidosis    Elevated MCV    Hypercholesterolemia    Female bladder prolapse    Tobacco use    Leukocytosis    GI bleed    Hepatic steatosis    Essential hypertension    Aortoiliac occlusive disease (HCC)    Chest pain with moderate risk for cardiac etiology    Urethral disorder       Objective   Ht 5' 3" (1 6 m)   Wt 71 2 kg (157 lb)   BMI 27 81 kg/m²     Physical Exam  Vitals reviewed  Constitutional:       Appearance: Normal appearance  HENT:      Head: Normocephalic and atraumatic  Eyes:      Extraocular Movements: Extraocular movements intact  Cardiovascular:      Rate and Rhythm: Normal rate and regular rhythm  Pulses: Normal pulses  Pulmonary:      Effort: No respiratory distress  Breath sounds: No stridor  Genitourinary:     General: Normal vulva  Comments: Soft suburethral mass as before, no change  No evidence of carcinoma of the vulva or vagina itself  Musculoskeletal:         General: Normal range of motion  Cervical back: Normal range of motion  Skin:     Coloration: Skin is not jaundiced or pale  Neurological:      General: No focal deficit present  Mental Status: She is alert and oriented to person, place, and time  Psychiatric:         Mood and Affect: Mood normal          Behavior: Behavior normal          Thought Content:  Thought content normal          Judgment: Judgment normal              Current Medications     Current Outpatient Medications:    aspirin (ECOTRIN LOW STRENGTH) 81 mg EC tablet, Take 81 mg by mouth daily, Disp: , Rfl:     Dulaglutide (Trulicity) 6 34 EU/3 3AA SOPN, Inject 0 5 mL (0 75 mg total) under the skin once a week, Disp: 2 mL, Rfl: 5    Empagliflozin (Jardiance) 10 MG TABS, Take 1 tablet (10 mg total) by mouth every morning, Disp: 90 tablet, Rfl: 3    famotidine (PEPCID) 40 MG tablet, Take 1 tablet (40 mg total) by mouth daily, Disp: 90 tablet, Rfl: 3    fluticasone (FLONASE) 50 mcg/act nasal spray, 2 sprays into each nostril daily, Disp: 16 g, Rfl: 1    hydroxyurea (HYDREA) 500 mg capsule, TAKE 1 CAPSULE BY MOUTH ON ODD NUMBERED DAYS AND 2 CAPSULES BY MOUTH ON EVEN NUMBERED DAYS, Disp: 45 capsule, Rfl: 3    losartan (COZAAR) 25 mg tablet, Take 1 tablet (25 mg total) by mouth daily, Disp: 90 tablet, Rfl: 3    nitrofurantoin (MACRODANTIN) 50 mg capsule, Take 1 capsule (50 mg total) by mouth daily, Disp: 30 capsule, Rfl: 3    ondansetron (ZOFRAN-ODT) 4 mg disintegrating tablet, Take 1 tablet (4 mg total) by mouth every 6 (six) hours as needed for nausea or vomiting, Disp: 20 tablet, Rfl: 0    oxybutynin (DITROPAN-XL) 10 MG 24 hr tablet, Take 1 tablet (10 mg total) by mouth daily at bedtime, Disp: 30 tablet, Rfl: 12    simvastatin (ZOCOR) 20 mg tablet, Take 1 tablet (20 mg total) by mouth daily at bedtime, Disp: 90 tablet, Rfl: 3        Marian Noriega MD

## 2022-06-30 NOTE — PROGRESS NOTES
[address] [facility]  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  We discussed removal of this  I believe she may benefit from debulking this and sparing the urethra  There is a chance of recurrence giving that this is a keloid type tumor and she is aware of this  The risks of bleeding infection vesicle vaginal fistula or urethral vaginal fistula, recurrence have been explained  We will obtain vascular surgery consultation and Cardiology consultation for preoperative clearance beforehand  She has chronic pain with this, and she also has a history of leukemia  With all of this I do not think it is unreasonable to give her better pain medication as ibuprofen is not working and also may be risky to her in her condition  Therefore we will prescribe tramadol 50 mg to take every 6 hours as needed  This will be refilled upon her request   She is not taking any other opiates or controlled substances  Chief Complaint     Chief Complaint   Patient presents with    Follow-up       History of Present Illness   Periurethral mass-status post open biopsy by me which formed after mid urethral sling placement elsewhere years ago  This was biopsied by me 3/17/2021 which was a wedge biopsy and pathology showed no malignancy, just fibrovascular smooth muscle proliferation consistent with angiofibroma  I saw her 2021 and there was persistently large mass, proximally 2 cm involving the urethra  CT scan 3/28/2022 shows urethral mass present, unchanged in appearance since prior studies  She has seen Gynecologic Oncology  The pain is pretty significant  She also is having more difficulty urinating  She has to lean forward to get her stream going  She is having urinary frequency, 12 13 times in a 4 hour  She sleeps through the night because she takes Excedrin and Benadryl at night  No gross hematuria slight dysuria      She has significant vascular disease history with the stent in her left iliac artery  She will need preoperative vascular surgery clearance as well as cardiology clearance  The following portions of the patient's history were reviewed and updated as appropriate: allergies, current medications, past family history, past medical history, past social history, past surgical history and problem list   Past Medical History:   Diagnosis Date    Arthritis     Cancer ([facility] [address] )      chronic leukemia    Chronic kidney disease     cyst in left kidney   Diabetes mellitus ([facility] [address] )     Frequent sinus infections     Pt states she has a mass in left side of sinus    GERD (gastroesophageal reflux disease)     History of echocardiogram 11/19/2008    Normal     History of nuclear stress test 11/19/2008    EF 66%, No evidence for ischemia      Hyperlipidemia     Hypertension     Missing tooth, acquired     Pt reports losing a tooth d/t chemotherapy    PONV (postoperative nausea and vomiting)     Tumor     Urethral    Wears glasses      Past Surgical History:   Procedure Laterality Date    APPENDECTOMY      BLADDER AUGMENTATION      CHOLECYSTECTOMY      COLONOSCOPY      CYSTOSCOPY  12/28/2020    HERNIA REPAIR Right     inguinal    HYSTERECTOMY      IR AORTAGRAM WITH RUN-OFF  6/14/2019    KNEE ARTHROPLASTY      KNEE ARTHROSCOPY Bilateral     ND COLONOSCOPY FLX DX W/COLLJ SPEC WHEN PFRMD N/A 1/23/2018    Procedure: COLONOSCOPY;  Surgeon: Sedrick Escobar DO;  Location: [facility] MAIN OR;  Service: Gastroenterology    ND Cr Alexander 3RD+ ORD Naomia Combe ABDL PEL/LXTR [facility] Left 6/14/2019    Procedure: ARTERIOGRAM-arteriography and possible endovascular intervention ;  Surgeon: Radha Pagan MD;  Location: [facility] MAIN OR;  Service: Vascular    PUBOVAGINAL SLING N/A 3/17/2021    Procedure: Open transvaginal urethral biopsy;  Surgeon: Yunier Fowler MD;  Location:  MAIN OR;  Service: Urology    REDUCTION MAMMAPLASTY      SHOULDER SURGERY      WISDOM TOOTH EXTRACTION       shoulder  Review of Systems   Review of Systems   Constitutional: Negative for fever  Respiratory: Negative for shortness of breath  Cardiovascular: Negative for chest pain  Gastrointestinal: Negative  Genitourinary: Positive for difficulty urinating, dysuria, frequency, pelvic pain, urgency and vaginal pain  Negative for hematuria  Musculoskeletal: Negative for back pain  Active Problem List     Patient Active Problem List   Diagnosis    Polycythemia vera ([facility] [address] [facility]    Type 2 diabetes mellitus with hyperglycemia, without long-term current use of insulin ([facility] [address] [facility]    Acute cystitis without hematuria    Lactic acidosis    Elevated MCV    Hypercholesterolemia    Female bladder prolapse    Tobacco use    Leukocytosis    GI bleed    Hepatic steatosis    Essential hypertension    Aortoiliac occlusive disease (HCC)    Chest pain with moderate risk for cardiac etiology    Urethral disorder       Objective   Ht 5' 3" (1 6 m)   Wt 71 2 kg (157 lb)   BMI 27 81 kg/m²     Physical Exam  Vitals reviewed  Constitutional:       Appearance: Normal appearance  HENT:      Head: Normocephalic and atraumatic  Eyes:      Extraocular Movements: Extraocular movements intact  Cardiovascular:      Rate and Rhythm: Normal rate and regular rhythm  Pulses: Normal pulses  Pulmonary:      Effort: No respiratory distress  Breath sounds: No stridor  Genitourinary:     General: Normal vulva  Comments: Soft suburethral mass as before, no change  No evidence of carcinoma of the vulva or vagina itself  Musculoskeletal:         General: Normal range of motion  Cervical back: Normal range of motion     Skin:     Coloration: Skin is not jaundiced or pale  Neurological:      General: No focal deficit present  Mental Status: She is alert and oriented to person, place, and time  Psychiatric:         Mood and Affect: Mood normal          Behavior: Behavior normal          Thought Content:  Thought content normal          Judgment: Judgment normal              Current Medications     Current Outpatient Medications:     aspirin (ECOTRIN LOW STRENGTH) 81 mg EC tablet, Take 81 mg by mouth daily, Disp: , Rfl:     Dulaglutide (Trulicity) 3 71 TV/6 3KJ SOPN, Inject 0 5 mL (0 75 mg total) under the skin once a week, Disp: 2 mL, Rfl: 5    Empagliflozin (Jardiance) 10 MG TABS, Take 1 tablet (10 mg total) by mouth every morning, Disp: 90 tablet, Rfl: 3    famotidine (PEPCID) 40 MG tablet, Take 1 tablet (40 mg total) by mouth daily, Disp: 90 tablet, Rfl: 3    fluticasone (FLONASE) 50 mcg/act nasal spray, 2 sprays into each nostril daily, Disp: 16 g, Rfl: 1    hydroxyurea (HYDREA) 500 mg capsule, TAKE 1 CAPSULE BY MOUTH ON ODD NUMBERED DAYS AND 2 CAPSULES BY MOUTH ON EVEN NUMBERED DAYS, Disp: 45 capsule, Rfl: 3    losartan (COZAAR) 25 mg tablet, Take 1 tablet (25 mg total) by mouth daily, Disp: 90 tablet, Rfl: 3    nitrofurantoin (MACRODANTIN) 50 mg capsule, Take 1 capsule (50 mg total) by mouth daily, Disp: 30 capsule, Rfl: 3    ondansetron (ZOFRAN-ODT) 4 mg disintegrating tablet, Take 1 tablet (4 mg total) by mouth every 6 (six) hours as needed for nausea or vomiting, Disp: 20 tablet, Rfl: 0    oxybutynin (DITROPAN-XL) 10 MG 24 hr tablet, Take 1 tablet (10 mg total) by mouth daily at bedtime, Disp: 30 tablet, Rfl: 12    simvastatin (ZOCOR) 20 mg tablet, Take 1 tablet (20 mg total) by mouth daily at bedtime, Disp: 90 tablet, Rfl: 3        Bobby Davis MD

## 2022-07-01 ENCOUNTER — TELEPHONE (OUTPATIENT)
Dept: UROLOGY | Facility: CLINIC | Age: 57
End: 2022-07-01

## 2022-07-01 ENCOUNTER — TELEPHONE (OUTPATIENT)
Dept: OTHER | Facility: OTHER | Age: 57
End: 2022-07-01

## 2022-07-01 ENCOUNTER — PREP FOR PROCEDURE (OUTPATIENT)
Dept: UROLOGY | Facility: CLINIC | Age: 57
End: 2022-07-01

## 2022-07-01 DIAGNOSIS — N89.8 VAGINAL MASS: Primary | ICD-10-CM

## 2022-07-01 NOTE — TELEPHONE ENCOUNTER
----- Message from Al Raines MD sent at 6/30/2022  3:29 PM EDT -----  Please schedule for surgery, she will need cardiology and vascular surgery clearance preop

## 2022-07-01 NOTE — TELEPHONE ENCOUNTER
Called and spoke with patient to schedule sx  Patient informed of all PAT's needed prior to sx and advised to stop any anticoagulant medication including Multi-vitamins, Fish oil, Krill oil and NSAID 2 weeks prior  Informed patient nothing to eat or drink after midnight the night before  Patient has also been inform that the hospital will call the day before sx with arrival time and procedure time  Patient also informed to have a  bring them to and from hospital  Bowel Prep and Showering Instruction has been told to the patient and will also be included in Surgical Packet to be mailed      Sx Date: 08/31  [facility]  Physician: Arthur Alonzo  H&P Date:    Clearance Date: cardiac 8/22/ Vascular 8/15  Consent: on admit  Pre-cert Added to  list on : 7/01 [facility]    PAT's Ordered to be done on:  8/17 cbc/ bmp/ a1c/ ucx/ ekg      Surgical Packet to be: mailed

## 2022-07-01 NOTE — TELEPHONE ENCOUNTER
Pharmacy needs prior authorization for Tramadol prescription  Patient is experiencing a lot of pain

## 2022-07-04 NOTE — TELEPHONE ENCOUNTER
Prior Authorization for Tramadol 50mg was requested and initiated via CoverMyMeds  com - [facility] [place] - [phone]  Response questions answered and submitted for consideration  Final determination is expected within 72 hours

## 2022-07-07 DIAGNOSIS — R11.0 NAUSEA: ICD-10-CM

## 2022-07-07 DIAGNOSIS — E11.65 TYPE 2 DIABETES MELLITUS WITH HYPERGLYCEMIA, WITHOUT LONG-TERM CURRENT USE OF INSULIN (HCC): ICD-10-CM

## 2022-07-08 RX ORDER — ONDANSETRON 4 MG/1
4 TABLET, ORALLY DISINTEGRATING ORAL EVERY 6 HOURS PRN
Qty: 20 TABLET | Refills: 0 | Status: SHIPPED | OUTPATIENT
Start: 2022-07-08

## 2022-07-08 RX ORDER — DULAGLUTIDE 0.75 MG/.5ML
0.75 INJECTION, SOLUTION SUBCUTANEOUS WEEKLY
Qty: 2 ML | Refills: 0 | Status: SHIPPED | OUTPATIENT
Start: 2022-07-08 | End: 2022-07-11 | Stop reason: SDUPTHER

## 2022-07-11 DIAGNOSIS — E11.65 TYPE 2 DIABETES MELLITUS WITH HYPERGLYCEMIA, WITHOUT LONG-TERM CURRENT USE OF INSULIN (HCC): ICD-10-CM

## 2022-07-11 RX ORDER — DULAGLUTIDE 0.75 MG/.5ML
0.75 INJECTION, SOLUTION SUBCUTANEOUS WEEKLY
Qty: 2 ML | Refills: 1 | Status: SHIPPED | OUTPATIENT
Start: 2022-07-11 | End: 2022-09-12 | Stop reason: SDUPTHER

## 2022-07-12 ENCOUNTER — TELEMEDICINE (OUTPATIENT)
Dept: HEMATOLOGY ONCOLOGY | Facility: CLINIC | Age: 57
End: 2022-07-12
Payer: COMMERCIAL

## 2022-07-12 DIAGNOSIS — D45 POLYCYTHEMIA VERA (HCC): Primary | ICD-10-CM

## 2022-07-12 DIAGNOSIS — R71.8 ELEVATED MCV: ICD-10-CM

## 2022-07-12 DIAGNOSIS — Z29.8 NEED FOR PROPHYLAXIS AGAINST URINARY TRACT INFECTION: ICD-10-CM

## 2022-07-12 PROBLEM — E87.2 LACTIC ACIDOSIS: Status: RESOLVED | Noted: 2019-03-16 | Resolved: 2022-07-12

## 2022-07-12 PROBLEM — E87.20 LACTIC ACIDOSIS: Status: RESOLVED | Noted: 2019-03-16 | Resolved: 2022-07-12

## 2022-07-12 PROBLEM — D72.829 LEUKOCYTOSIS: Status: RESOLVED | Noted: 2019-03-16 | Resolved: 2022-07-12

## 2022-07-12 PROCEDURE — G2012 BRIEF CHECK IN BY MD/QHP: HCPCS | Performed by: NURSE PRACTITIONER

## 2022-07-12 RX ORDER — NITROFURANTOIN MACROCRYSTALS 50 MG/1
50 CAPSULE ORAL DAILY
Qty: 30 CAPSULE | Refills: 11 | Status: SHIPPED | OUTPATIENT
Start: 2022-07-12

## 2022-07-12 RX ORDER — HYDROXYUREA 500 MG/1
CAPSULE ORAL
Qty: 45 CAPSULE | Refills: 11 | Status: SHIPPED | OUTPATIENT
Start: 2022-07-12

## 2022-07-12 NOTE — PROGRESS NOTES
Virtual Brief Visit    Patient is located in the following state in which I hold an active license [place]  Assessment/Plan:    Problem List Items Addressed This Visit        Other    Polycythemia vera ([address] ) - Primary    Relevant Medications    hydroxyurea (HYDREA) 500 mg capsule    Other Relevant Orders    CBC and differential    Comprehensive metabolic panel    Elevated MCV    Relevant Orders    CBC and differential    Comprehensive metabolic panel      Other Visit Diagnoses     Need for prophylaxis against urinary tract infection        Relevant Medications    nitrofurantoin (MACRODANTIN) 50 mg capsule       patient is a 61-year-old female with a history of polycythemia currently on Hydrea alternating 500 mg and 1000 mg every other day  She takes baby aspirin 81 mg p o  once daily  She is tolerating this without difficulty  Recent labs reveal a hemoglobin of 15 7, hematocrit 47 0, , otherwise normal CBC and differential   These are within her established range  Metabolic panel is within normal limits  Overall patient is able to function without restriction  She was last seen in 2020 and lost to follow-up secondary to other family members health issues  She is getting back on track with her own providers  Mammogram is ordered and colonoscopy done in 2018  She is not due until 2028  She is anticipated to have oral surgery in the near future  We discussed clearance from hematologic standpoint  I advised her to continue taking the Hydrea however she should stop the baby aspirin approximately 5-7 days prior to the surgery or as per surgeon  She also has a periurethral angiofibroma that is painful and persistent  She is anticipated to have a debulking with urology  She is expected to undergo preop vascular surgery and cardiology clearance  Again from a hematologic standpoint she is okay to proceed with surgery, holding aspirin 5-7 days as per surgeon's preference  She does not need to hold the Hydrea  We will continue monitoring blood work every 3 months adjusting Hydrea as indicated and plan to see her in 1 year  Patient verbalized understanding and is in agreement with the plan  Recent Visits  No visits were found meeting these conditions  Showing recent visits within past 7 days and meeting all other requirements  Today's Visits  Date Type Provider Dept   07/12/22 Telemedicine JUANITA Amezquita [facility]   Showing today's visits and meeting all other requirements  Future Appointments  No visits were found meeting these conditions    Showing future appointments within next 150 days and meeting all other requirements     I spent 14 minutes directly with the patient during this visit

## 2022-07-12 NOTE — TELEPHONE ENCOUNTER
Chloé calling from Vascular for the surgery scheduler        They needed to know "does she need to hold her Asprin for this and for how long"    Please call Enrique Little back at [phone]

## 2022-07-13 ENCOUNTER — TELEPHONE (OUTPATIENT)
Dept: VASCULAR SURGERY | Facility: CLINIC | Age: 57
End: 2022-07-13

## 2022-07-13 NOTE — TELEPHONE ENCOUNTER
Received fax from urology office  Pt is scheduled for cystoscopy, excision of vaginal/periurethreal mass transvaginally 8/31/22  They are requesting vascular clearance and also need pt's ASA held for 4 days  Please advise

## 2022-07-14 NOTE — TELEPHONE ENCOUNTER
Request for Tramadol Hcl 100mg tablets was DENIED (See reason on scanned letter)  Patient advised to pay cash and use [employer] pharmacy discount card  No further action required

## 2022-07-19 ENCOUNTER — HOSPITAL ENCOUNTER (EMERGENCY)
Facility: HOSPITAL | Age: 57
Discharge: HOME | End: 2022-07-19
Attending: EMERGENCY MEDICINE
Payer: COMMERCIAL

## 2022-07-19 ENCOUNTER — APPOINTMENT (EMERGENCY)
Dept: RADIOLOGY | Facility: HOSPITAL | Age: 57
End: 2022-07-19
Payer: COMMERCIAL

## 2022-07-19 VITALS
OXYGEN SATURATION: 97 % | RESPIRATION RATE: 18 BRPM | BODY MASS INDEX: 31.29 KG/M2 | TEMPERATURE: 97 F | HEIGHT: 63 IN | DIASTOLIC BLOOD PRESSURE: 77 MMHG | WEIGHT: 176.59 LBS | HEART RATE: 81 BPM | SYSTOLIC BLOOD PRESSURE: 167 MMHG

## 2022-07-19 DIAGNOSIS — M25.522 LEFT ELBOW PAIN: Primary | ICD-10-CM

## 2022-07-19 PROCEDURE — 99282 EMERGENCY DEPT VISIT SF MDM: CPT | Performed by: EMERGENCY MEDICINE

## 2022-07-19 PROCEDURE — 73080 X-RAY EXAM OF ELBOW: CPT

## 2022-07-19 PROCEDURE — 99283 EMERGENCY DEPT VISIT LOW MDM: CPT

## 2022-07-20 NOTE — ED PROVIDER NOTES
History  Chief Complaint   Patient presents with    Arm Pain     Left arm pain, states her left elbow has been hurting her for about 2 days, she says she cannot extend her arm and has a lump near her elbow, denies any injury      66-year-old female presents for evaluation of left elbow pain  Patient reports the pain began 2-3 days ago it is localized to her left lateral elbow area  Pain does not radiate, she denies numbness or tingling  Patient does report when she was younger suffering a fracture to this elbow, she denies any present trauma that she is aware of  She denies skin discoloration or wound in this area  She has been using over-the-counter pain medications without relief  Patient has pain when she attempts to extend her for forearm at the elbow, pain with lateral movement at the elbow  Prior to Admission Medications   Prescriptions Last Dose Informant Patient Reported? Taking?    Dulaglutide (Trulicity) 1 43  4WR SOPN   No No   Sig: Inject 0 5 mL (0 75 mg total) under the skin once a week   Empagliflozin (Jardiance) 10 MG TABS  Self No No   Sig: Take 1 tablet (10 mg total) by mouth every morning   aspirin (ECOTRIN LOW STRENGTH) 81 mg EC tablet  Self Yes No   Sig: Take 81 mg by mouth daily   famotidine (PEPCID) 40 MG tablet  Self No No   Sig: Take 1 tablet (40 mg total) by mouth daily   fluticasone (FLONASE) 50 mcg/act nasal spray  Self No No   Si sprays into each nostril daily   hydroxyurea (HYDREA) 500 mg capsule   No No   Sig: TAKE 1 CAPSULE BY MOUTH ON ODD NUMBERED DAYS AND 2 CAPSULES BY MOUTH ON EVEN NUMBERED DAYS   losartan (COZAAR) 25 mg tablet  Self No No   Sig: Take 1 tablet (25 mg total) by mouth daily   nitrofurantoin (MACRODANTIN) 50 mg capsule   No No   Sig: Take 1 capsule (50 mg total) by mouth daily   ondansetron (ZOFRAN-ODT) 4 mg disintegrating tablet   No No   Sig: Take 1 tablet (4 mg total) by mouth every 6 (six) hours as needed for nausea or vomiting   oxybutynin (DITROPAN-XL) 10 MG 24 hr tablet  Self No No   Sig: Take 1 tablet (10 mg total) by mouth daily at bedtime   simvastatin (ZOCOR) 20 mg tablet  Self No No   Sig: Take 1 tablet (20 mg total) by mouth daily at bedtime   traMADol (Ultram) 50 mg tablet   No No   Sig: Take 1 tablet (50 mg total) by mouth every 6 (six) hours as needed for moderate pain      Facility-Administered Medications: None       Past Medical History:   Diagnosis Date    Arthritis     Cancer ([address] )      chronic leukemia    Chronic kidney disease     cyst in left kidney   Diabetes mellitus ([address] )     Frequent sinus infections     Pt states she has a mass in left side of sinus    GERD (gastroesophageal reflux disease)     History of echocardiogram 11/19/2008    Normal     History of nuclear stress test 11/19/2008    EF 66%, No evidence for ischemia      Hyperlipidemia     Hypertension     Missing tooth, acquired     Pt reports losing a tooth d/t chemotherapy    PONV (postoperative nausea and vomiting)     Tumor     Urethral    Wears glasses        Past Surgical History:   Procedure Laterality Date    APPENDECTOMY      BLADDER AUGMENTATION      CHOLECYSTECTOMY      COLONOSCOPY      CYSTOSCOPY  12/28/2020    HERNIA REPAIR Right     inguinal    HYSTERECTOMY      IR AORTAGRAM WITH RUN-OFF  6/14/2019    KNEE ARTHROPLASTY      KNEE ARTHROSCOPY Bilateral     MI COLONOSCOPY FLX DX W/COLLJ SPEC WHEN PFRMD N/A 1/23/2018    Procedure: COLONOSCOPY;  Surgeon: Sandra Joel DO;  Location: [facility] MAIN OR;  Service: Gastroenterology    MI Divina Crystal 3RD+ ORD SLCTV ABDL PEL/LXTR [address] [facility] Left 6/14/2019    Procedure: ARTERIOGRAM-arteriography and possible endovascular intervention ;  Surgeon: Waqar Daniels MD;  Location:  MAIN OR;  Service: Vascular    PUBOVAGINAL SLING N/A 3/17/2021    Procedure: Open transvaginal urethral biopsy;  Surgeon: Teodora Dwyer MD;  Location:  MAIN OR;  Service: Urology    REDUCTION MAMMAPLASTY     [address] [facility] SURGERY      WISDOM TOOTH EXTRACTION         Family History   Problem Relation Age of Onset    Cancer Mother     Heart disease Father     Diabetes Father      I have reviewed and agree with the history as documented  E-Cigarette/Vaping    E-Cigarette Use Never User      E-Cigarette/Vaping Substances    Nicotine No     THC No     CBD No     Flavoring No     Other No     Unknown No      Social History     Tobacco Use    Smoking status: Current Every Day Smoker     Packs/day: 1 00     Types: Cigarettes    Smokeless tobacco: Never Used    Tobacco comment: not as much   Vaping Use    Vaping Use: Never used   Substance Use Topics    Alcohol use: Not Currently     Alcohol/week: 0 0 standard drinks     Comment: N/A    Drug use: No       Review of Systems   Constitutional: Negative for appetite change, chills and fever  Respiratory: Negative for shortness of breath  Cardiovascular: Negative for chest pain  Gastrointestinal: Negative for nausea and vomiting  Musculoskeletal: Positive for arthralgias  Negative for back pain and neck pain  Skin: Negative for wound  Neurological: Negative for weakness and numbness  All other systems reviewed and are negative  Physical Exam  Physical Exam  Vitals reviewed  Constitutional:       General: She is not in acute distress  Appearance: Normal appearance  She is not ill-appearing, toxic-appearing or diaphoretic  HENT:      Head: Normocephalic and atraumatic  Right Ear: External ear normal       Left Ear: External ear normal       Nose: Nose normal    Eyes:      General: No scleral icterus  Right eye: No discharge  Left eye: No discharge  Extraocular Movements: Extraocular movements intact  Cardiovascular:      Rate and Rhythm: Normal rate  Pulmonary:      Effort: Pulmonary effort is normal  No respiratory distress  Musculoskeletal:         General: Tenderness present  No deformity or signs of injury  Comments: Small amount of localized swelling to lateral elbow, questionable blue-tinged like ecchymosis in this area with tenderness, no skin breakdown;  strength 5/5, sensation along arm intact; pain with elbow flexion/extension and decreased ROM, position of comfort is with arm flexed against abdomen   Skin:     General: Skin is warm  Coloration: Skin is not jaundiced or pale  Neurological:      General: No focal deficit present  Mental Status: She is alert  Mental status is at baseline  Sensory: No sensory deficit  Motor: No weakness  Vital Signs  ED Triage Vitals [07/19/22 2211]   Temperature Pulse Respirations Blood Pressure SpO2   (!) 97 °F (36 1 °C) 82 18 136/67 98 %      Temp Source Heart Rate Source Patient Position - Orthostatic VS BP Location FiO2 (%)   Temporal Monitor Sitting Right arm --      Pain Score       6           Vitals:    07/19/22 2211 07/19/22 2215 07/19/22 2230   BP: 136/67 136/67 167/77   Pulse: 82 81 81   Patient Position - Orthostatic VS: Sitting           Visual Acuity      ED Medications  Medications - No data to display    Diagnostic Studies  Results Reviewed     None                 XR elbow 3+ views LEFT   Final Result by Heike Perera MD (07/20 5833)      No acute osseous abnormality  Workstation performed: [id number]                    Procedures  Procedures         ED Course  ED Course as of 07/21/22 0937   Tue Jul 19, 2022 2246 XR elbow 3+ views LEFT  No acute osseous findings, no significant swelling/edema                                             MDM  Number of Diagnoses or Management Options  Left elbow pain  Diagnosis management comments: 31-year-old female presents for evaluation of left elbow pain  Patient reports history of elbow fracture when she was younger, she denies known trauma presently    On examination she has point tenderness to her lateral elbow, possible small localized swelling with may be skin discoloration representing ecchymosis  Bedside ultrasound shows no fluid collections  Possible with previous elbow fracture patient suffered a subluxation, possibly bursitis  Doubt septic joint at this point given normal vital signs, no denies other systemic symptoms  Will obtain x-ray and place in sling for comfort, provide orthopedic follow-up  Disposition  Final diagnoses:   Left elbow pain     Time reflects when diagnosis was documented in both MDM as applicable and the Disposition within this note     Time User Action Codes Description Comment    7/19/2022 10:46 PM Sumit Davila Add [Z97 [id number]] Left elbow pain       ED Disposition     ED Disposition   Discharge    Condition   Stable    Date/Time   Tue Jul 19, 2022 10:46 PM    Comment   Yoli Cervantes discharge to home/self care                 Follow-up Information     Follow up With Specialties Details Why Contact Info Additional Information    [address] Specialists Chiquita Burris Orthopedic Surgery   [address] [facility] [address]  [address] Specialists Michael Wolfe [address] [place], [place], [address]          Discharge Medication List as of 7/19/2022 10:48 PM      CONTINUE these medications which have NOT CHANGED    Details   aspirin (ECOTRIN LOW STRENGTH) 81 mg EC tablet Take 81 mg by mouth daily, Historical Med      Dulaglutide (Trulicity) 5 51 UK/8 7EQ SOPN Inject 0 5 mL (0 75 mg total) under the skin once a week, Starting Mon 7/11/2022, Normal      Empagliflozin (Jardiance) 10 MG TABS Take 1 tablet (10 mg total) by mouth every morning, Starting Mon 2/21/2022, Normal      famotidine (PEPCID) 40 MG tablet Take 1 tablet (40 mg total) by mouth daily, Starting Tue 6/7/2022, Normal      fluticasone (FLONASE) 50 mcg/act nasal spray 2 sprays into each nostril daily, Starting Thu 4/28/2022, Normal      hydroxyurea (HYDREA) 500 mg capsule TAKE 1 CAPSULE BY MOUTH ON ODD NUMBERED DAYS AND 2 CAPSULES BY MOUTH ON EVEN NUMBERED DAYS, Normal      losartan (COZAAR) 25 mg tablet Take 1 tablet (25 mg total) by mouth daily, Starting Thu 4/28/2022, Normal      nitrofurantoin (MACRODANTIN) 50 mg capsule Take 1 capsule (50 mg total) by mouth daily, Starting Tue 7/12/2022, Normal      ondansetron (ZOFRAN-ODT) 4 mg disintegrating tablet Take 1 tablet (4 mg total) by mouth every 6 (six) hours as needed for nausea or vomiting, Starting Fri 7/8/2022, Normal      oxybutynin (DITROPAN-XL) 10 MG 24 hr tablet Take 1 tablet (10 mg total) by mouth daily at bedtime, Starting Mon 11/8/2021, Normal      simvastatin (ZOCOR) 20 mg tablet Take 1 tablet (20 mg total) by mouth daily at bedtime, Starting Mon 11/22/2021, Normal      traMADol (Ultram) 50 mg tablet Take 1 tablet (50 mg total) by mouth every 6 (six) hours as needed for moderate pain, Starting Thu 6/30/2022, Normal             No discharge procedures on file      PDMP Review     None          ED Provider  Electronically Signed by           Bella Thomson DO  07/21/22 0671

## 2022-07-27 ENCOUNTER — TELEPHONE (OUTPATIENT)
Dept: HEMATOLOGY ONCOLOGY | Facility: CLINIC | Age: 57
End: 2022-07-27

## 2022-07-27 NOTE — TELEPHONE ENCOUNTER
Rec'd call from patient telling me that her [facility] DR MATY JIMENEZ does not have her hydrea or macrodantin  They informed her that the medications were canceled  I told her I will call the pharmacy and see what's going on with the medications  I spoke with the [facility] DR MATY JIMENEZ and they stated that the patient can get a refill of the macrodantin and the patient can get a refill of the hydrea on the 30th of this month  Spoke with patient to let her know that the pharmacy will have her macrodantin available after 2pm today and the hydrea will be available after the 30th  Patient verbalized understanding

## 2022-07-29 LAB
LEFT EYE DIABETIC RETINOPATHY: POSITIVE
RIGHT EYE DIABETIC RETINOPATHY: POSITIVE

## 2022-08-03 ENCOUNTER — TELEPHONE (OUTPATIENT)
Dept: FAMILY MEDICINE CLINIC | Facility: CLINIC | Age: 57
End: 2022-08-03

## 2022-08-03 DIAGNOSIS — Z20.822 EXPOSURE TO COVID-19 VIRUS: Primary | ICD-10-CM

## 2022-08-03 DIAGNOSIS — Z20.822 EXPOSURE TO COVID-19 VIRUS: ICD-10-CM

## 2022-08-03 DIAGNOSIS — B34.9 VIRAL INFECTION, UNSPECIFIED: ICD-10-CM

## 2022-08-03 NOTE — TELEPHONE ENCOUNTER
Daughter is positive for covid, patient has fatigue and cough  Can you order test for her? Let her know when to come and where to go

## 2022-08-04 PROCEDURE — U0003 INFECTIOUS AGENT DETECTION BY NUCLEIC ACID (DNA OR RNA); SEVERE ACUTE RESPIRATORY SYNDROME CORONAVIRUS 2 (SARS-COV-2) (CORONAVIRUS DISEASE [COVID-19]), AMPLIFIED PROBE TECHNIQUE, MAKING USE OF HIGH THROUGHPUT TECHNOLOGIES AS DESCRIBED BY CMS-2020-01-R: HCPCS | Performed by: FAMILY MEDICINE

## 2022-08-04 PROCEDURE — U0005 INFEC AGEN DETEC AMPLI PROBE: HCPCS | Performed by: FAMILY MEDICINE

## 2022-08-05 LAB — SARS-COV-2 RNA RESP QL NAA+PROBE: NEGATIVE

## 2022-08-19 NOTE — TELEPHONE ENCOUNTER
Pt called requested to reschedule surgery to October with Dr Greene       Pt can be reached at [phone]

## 2022-08-19 NOTE — TELEPHONE ENCOUNTER
Patient has been rescheduled for 10/27 at [facility] with PB    Pats 10/13  Cardio: 10/06  Vascular: 10/10

## 2022-08-23 NOTE — TELEPHONE ENCOUNTER
Hospital called to make sure we asked the patient t stop the aspirin before her surgery on 10/27  I read Laurie's notes from 7/1/22 at 220pm which specify that she did ask patient to stop the VSAID 2 weeks prior to sx

## 2022-08-29 ENCOUNTER — TELEPHONE (OUTPATIENT)
Dept: VASCULAR SURGERY | Facility: CLINIC | Age: 57
End: 2022-08-29

## 2022-08-29 NOTE — TELEPHONE ENCOUNTER
Received fax from [facility]  Pt is scheduled for a cystoscopy, excision of vaginal/periurethral mass transvaginally by Dr Salvador Chen on 10/27/22  They are asking for vascular clearance, stating that pt is cleared for surgery  Per vascular telephone note of 7/13/22, pt would also need a 4 day hold of ASA prior to surgery  Routed to triage to advise if pt is cleared, and advise regarding an ASA hold

## 2022-08-29 NOTE — TELEPHONE ENCOUNTER
Reviewed  Patient cleared for surgery and ok to hold ASA 4 days prior to procedure and resume once cleared after surgery

## 2022-09-12 DIAGNOSIS — E11.65 TYPE 2 DIABETES MELLITUS WITH HYPERGLYCEMIA, WITHOUT LONG-TERM CURRENT USE OF INSULIN (HCC): ICD-10-CM

## 2022-09-12 RX ORDER — DULAGLUTIDE 0.75 MG/.5ML
0.75 INJECTION, SOLUTION SUBCUTANEOUS WEEKLY
Qty: 2 ML | Refills: 1 | Status: SHIPPED | OUTPATIENT
Start: 2022-09-12

## 2022-09-22 ENCOUNTER — VBI (OUTPATIENT)
Dept: ADMINISTRATIVE | Facility: OTHER | Age: 57
End: 2022-09-22

## 2022-09-22 NOTE — TELEPHONE ENCOUNTER
09/22/22 10:23 AM     See documentation in the VB [facility] SmartForm       Reyna Congregation

## 2022-10-13 ENCOUNTER — PREP FOR PROCEDURE (OUTPATIENT)
Dept: UROLOGY | Facility: AMBULATORY SURGERY CENTER | Age: 57
End: 2022-10-13

## 2022-10-13 DIAGNOSIS — Z01.810 PREOP CARDIOVASCULAR EXAM: Primary | ICD-10-CM

## 2022-10-13 NOTE — TELEPHONE ENCOUNTER
Cardiology called they need a referral for the patients appointment tomorrow for their clearance for upcoming surgery with Dr Marylen Herbert

## 2022-10-14 ENCOUNTER — CONSULT (OUTPATIENT)
Dept: CARDIOLOGY CLINIC | Facility: CLINIC | Age: 57
End: 2022-10-14
Payer: COMMERCIAL

## 2022-10-14 VITALS
HEART RATE: 84 BPM | SYSTOLIC BLOOD PRESSURE: 140 MMHG | WEIGHT: 155 LBS | HEIGHT: 63 IN | BODY MASS INDEX: 27.46 KG/M2 | DIASTOLIC BLOOD PRESSURE: 80 MMHG

## 2022-10-14 DIAGNOSIS — I10 ESSENTIAL HYPERTENSION: ICD-10-CM

## 2022-10-14 DIAGNOSIS — Z72.0 TOBACCO USE: Primary | ICD-10-CM

## 2022-10-14 DIAGNOSIS — E78.00 HYPERCHOLESTEROLEMIA: ICD-10-CM

## 2022-10-14 DIAGNOSIS — Z01.810 PREOP CARDIOVASCULAR EXAM: ICD-10-CM

## 2022-10-14 PROCEDURE — 99244 OFF/OP CNSLTJ NEW/EST MOD 40: CPT | Performed by: NURSE PRACTITIONER

## 2022-10-14 PROCEDURE — 93000 ELECTROCARDIOGRAM COMPLETE: CPT | Performed by: NURSE PRACTITIONER

## 2022-10-14 NOTE — PROGRESS NOTES
Patient ID: Farzana Smith is a 62 y o  female  Plan:      Preop cardiovascular exam  Stable to proceed as planned without additional cardiac testing    Tobacco use  Cessation encouraged    Hypercholesterolemia  Continue Zocor    Essential hypertension  Well controlled  Continue losartan       Follow up Plan/Summary Comments:  Based on her medical comorbidities and family history, Mary Gresham is higher than average risk for her upcoming low risk procedure  She routinely achieves greater than 4 Mets of activity with walking and stair climbing without any symptoms  No cardiac testing is indicated at this time  There is no cardiac contraindication to proceeding with her planned surgery  Mary Gresham may follow in our office on an as-needed basis  HPI: I had the pleasure of meeting Mary Gresham in the office today for a pre op cardiovascular exam at the request of Dr Mckay Street  She is scheduled to have a cystoscopy and excision of a vaginal mass  She denies any known cardiac history, though she has a significant family history-her father  at the age of 62 of an MI, her brother of complications from an MI in his late 42's  Her mother had CHF  She is active at home with household duties and walks outside for exercise several times per week  She denies any chest pain, pressure, tightness, burning  She does not have any palpitations or exertional dyspnea  She denies any dizziness, lightheadedness, syncope  She does smoke but is cutting back  No alcohol or drug use  Review of Systems   10  point ROS  was otherwise non pertinent or negative except as per HPI or as below     Gait: Normal      Most recent or relevant cardiac/vascular testing:      Results for orders placed or performed in visit on 10/14/22   POCT ECG    Impression    NSR, normal axis           Objective:     /80   Pulse 84   Ht 5' 3" (1 6 m)   Wt 70 3 kg (155 lb)   BMI 27 46 kg/m²     PHYSICAL EXAM:    General:  Normal appearance, no acute distress  Eyes:  Anicteric  Oral mucosa:  Moist   Neck:  No JVD  Carotid upstrokes are brisk without bruits  No masses  Chest:  Clear to auscultation   Cardiac:  No palpable PMI  Normal S1 and S2  No murmur gallop or rub  Abdomen:  Soft and nontender  No palpable organomegaly or aortic enlargement  Extremities:  No peripheral edema  Musculoskeletal:  Symmetric  Vascular:  Pedal pulses are intact  Neuro:  Grossly symmetric  Psych:  Alert and oriented x3      Allergies   Allergen Reactions   • Chantix [Varenicline] Other (See Comments)     Psychiatric side effects   • Clomiphene Hives   • Metformin      Lactic acidosis   • Wellbutrin [Bupropion] Other (See Comments)     Psychiatric side effects   • Latex Blisters       Current Outpatient Medications:   •  aspirin (ECOTRIN LOW STRENGTH) 81 mg EC tablet, Take 81 mg by mouth daily, Disp: , Rfl:   •  Dulaglutide (Trulicity) 9 28 OA/3 3QQ SOPN, Inject 0 5 mL (0 75 mg total) under the skin once a week, Disp: 2 mL, Rfl: 1  •  Empagliflozin (Jardiance) 10 MG TABS, Take 1 tablet (10 mg total) by mouth every morning, Disp: 90 tablet, Rfl: 3  •  famotidine (PEPCID) 40 MG tablet, Take 1 tablet (40 mg total) by mouth daily, Disp: 90 tablet, Rfl: 3  •  fluticasone (FLONASE) 50 mcg/act nasal spray, 2 sprays into each nostril daily, Disp: 16 g, Rfl: 1  •  hydroxyurea (HYDREA) 500 mg capsule, TAKE 1 CAPSULE BY MOUTH ON ODD NUMBERED DAYS AND 2 CAPSULES BY MOUTH ON EVEN NUMBERED DAYS, Disp: 45 capsule, Rfl: 11  •  losartan (COZAAR) 25 mg tablet, Take 1 tablet (25 mg total) by mouth daily, Disp: 90 tablet, Rfl: 3  •  nitrofurantoin (MACRODANTIN) 50 mg capsule, Take 1 capsule (50 mg total) by mouth daily, Disp: 30 capsule, Rfl: 11  •  ondansetron (ZOFRAN-ODT) 4 mg disintegrating tablet, Take 1 tablet (4 mg total) by mouth every 6 (six) hours as needed for nausea or vomiting, Disp: 20 tablet, Rfl: 0  •  oxybutynin (DITROPAN-XL) 10 MG 24 hr tablet, Take 1 tablet (10 mg total) by mouth daily at bedtime, Disp: 30 tablet, Rfl: 12  •  simvastatin (ZOCOR) 20 mg tablet, Take 1 tablet (20 mg total) by mouth daily at bedtime, Disp: 90 tablet, Rfl: 3  •  traMADol (Ultram) 50 mg tablet, Take 1 tablet (50 mg total) by mouth every 6 (six) hours as needed for moderate pain, Disp: 30 tablet, Rfl: 0  Past Medical History:   Diagnosis Date   • Arthritis    • Cancer ([facility] )      chronic leukemia   • Chronic kidney disease     cyst in left kidney  • Diabetes mellitus ([facility] )    • Frequent sinus infections     Pt states she has a mass in left side of sinus   • GERD (gastroesophageal reflux disease)    • History of echocardiogram 11/19/2008    Normal    • History of nuclear stress test 11/19/2008    EF 66%, No evidence for ischemia     • Hyperlipidemia    • Hypertension    • Missing tooth, acquired     Pt reports losing a tooth d/t chemotherapy   • PONV (postoperative nausea and vomiting)    • Tumor     Urethral   • Wears glasses      Past Surgical History:   Procedure Laterality Date   • APPENDECTOMY     • BLADDER AUGMENTATION     • CHOLECYSTECTOMY     • COLONOSCOPY     • CYSTOSCOPY  12/28/2020   • HERNIA REPAIR Right     inguinal   • HYSTERECTOMY     • IR AORTAGRAM WITH RUN-OFF  6/14/2019   • KNEE ARTHROPLASTY     • KNEE ARTHROSCOPY Bilateral    • WV COLONOSCOPY FLX DX W/COLLJ SPEC WHEN PFRMD N/A 1/23/2018    Procedure: COLONOSCOPY;  Surgeon: Lieutenant Magalie DO;  Location: [facility] MAIN OR;  Service: Gastroenterology   • W[place] SLCTV CATHJ 3RD+ ORD SLCTV ABDL PEL/LXTR [facility] Left 6/14/2019    Procedure: ARTERIOGRAM-arteriography and possible endovascular intervention ;  Surgeon: Robb Hills MD;  Location:  MAIN OR;  Service: Vascular   • PUBOVAGINAL SLING N/A 3/17/2021    Procedure: Open transvaginal urethral biopsy;  Surgeon: Brynn Garcia MD;  Location:  MAIN OR;  Service: Urology   • REDUCTION MAMMAPLASTY     • SHOULDER SURGERY     • WISDOM TOOTH EXTRACTION         CMP:   Lab Results Component Value Date     05/09/2014    K 4 3 06/30/2022    K 4 2 05/09/2014     06/30/2022     05/09/2014    CO2 29 06/30/2022    CO2 26 8 05/09/2014    BUN 13 06/30/2022    BUN 16 05/09/2014    CREATININE 0 86 06/30/2022    CREATININE 0 89 05/09/2014    GLUCOSE 138 05/09/2014    EGFR 75 06/30/2022     Lipid Profile:    Lab Results   Component Value Date    CHOL 240 05/09/2014    TRIG 165 (H) 05/13/2020    TRIG 130 05/09/2014    HDL 54 05/13/2020    HDL 46 05/09/2014         Social History     Tobacco Use   Smoking Status Current Every Day Smoker   • Packs/day: 1 00   • Types: Cigarettes   Smokeless Tobacco Never Used   Tobacco Comment    not as much

## 2022-10-20 ENCOUNTER — TELEPHONE (OUTPATIENT)
Dept: FAMILY MEDICINE CLINIC | Facility: CLINIC | Age: 57
End: 2022-10-20

## 2022-10-20 DIAGNOSIS — J01.00 ACUTE NON-RECURRENT MAXILLARY SINUSITIS: Primary | ICD-10-CM

## 2022-10-20 RX ORDER — AMOXICILLIN AND CLAVULANATE POTASSIUM 875; 125 MG/1; MG/1
1 TABLET, FILM COATED ORAL EVERY 12 HOURS SCHEDULED
Qty: 14 TABLET | Refills: 0 | Status: SHIPPED | OUTPATIENT
Start: 2022-10-20 | End: 2022-10-27

## 2022-10-20 NOTE — TELEPHONE ENCOUNTER
Rx put in for Augmentin  Push fluids  Get Mucinex over-the-counter  Make appointment if symptoms continue or increase

## 2022-10-20 NOTE — TELEPHONE ENCOUNTER
Pt c/o sinus infection and is scheduled for surgery next Thursday with Dr Rishi Salazar  Asking if you would prescribe an antibiotic?

## 2022-10-26 ENCOUNTER — TELEPHONE (OUTPATIENT)
Dept: OTHER | Facility: OTHER | Age: 57
End: 2022-10-26

## 2022-10-26 ENCOUNTER — TELEPHONE (OUTPATIENT)
Dept: FAMILY MEDICINE CLINIC | Facility: CLINIC | Age: 57
End: 2022-10-26

## 2022-10-26 NOTE — TELEPHONE ENCOUNTER
Patient is to have a procedure tomorrow but has to cancel due to sinus infection  Please call patient back to reschedule

## 2022-10-26 NOTE — TELEPHONE ENCOUNTER
Called and spoke with patient  Patient is rescheduled to 11/28/2022 at [facility] with Dr Marrero Duty

## 2022-10-26 NOTE — TELEPHONE ENCOUNTER
FYI:  She is feeling a lot better on the augmentin, she is blowing out a lot of green mucous, and she will be following up with ent for a sinus mass

## 2022-11-08 DIAGNOSIS — E11.65 TYPE 2 DIABETES MELLITUS WITH HYPERGLYCEMIA, WITHOUT LONG-TERM CURRENT USE OF INSULIN (HCC): ICD-10-CM

## 2022-11-08 RX ORDER — DULAGLUTIDE 0.75 MG/.5ML
0.75 INJECTION, SOLUTION SUBCUTANEOUS WEEKLY
Qty: 2 ML | Refills: 1 | Status: SHIPPED | OUTPATIENT
Start: 2022-11-08

## 2022-11-17 NOTE — PRE-PROCEDURE INSTRUCTIONS
Pre-Surgery Instructions:   Medication Instructions   • aspirin (ECOTRIN LOW STRENGTH) 81 mg EC tablet Instructions provided by MD   • Dulaglutide (Trulicity) 1 25 IY/9 7FJ SOPN Hold day of surgery  • Empagliflozin (Jardiance) 10 MG TABS Hold day of surgery  • famotidine (PEPCID) 40 MG tablet Take day of surgery  • fluticasone (FLONASE) 50 mcg/act nasal spray Uses PRN- OK to take day of surgery   • hydroxyurea (HYDREA) 500 mg capsule Take night before surgery   • losartan (COZAAR) 25 mg tablet Take night before surgery   • nitrofurantoin (MACRODANTIN) 50 mg capsule Hold day of surgery  • ondansetron (ZOFRAN-ODT) 4 mg disintegrating tablet Uses PRN- OK to take day of surgery   • oxybutynin (DITROPAN-XL) 10 MG 24 hr tablet Hold day of surgery  • simvastatin (ZOCOR) 20 mg tablet Take night before surgery   • traMADol (Ultram) 50 mg tablet Uses PRN- OK to take day of surgery    All pre-op instructions reviewed as per [facility] My Surgery Experience w/ pt verb understanding  Inst NPO post MN night before sx except sips water w/ meds morning of sx   Instructed to avoid all NSAIDs and OTC Vit/Supp from now until after surgery per anesthesia guidelines  Tylenol ok prn  Received pre-op showering instructions from surgeon office,inst to use antibacterial soap, reviewed process at time of call  Current hospital visitor & masking policy reviewed-inst to wear mask when in hospital  Inst to minimize smoking if at all possible morning of sx  Inst will receive phone call w/ time to report on DOS btwn 2-8 pm afternoon/evening before surgery from hospital nursing staff  Pt  Verbalized an understanding of all instructions reviewed and offers no concerns at this time

## 2022-11-21 ENCOUNTER — APPOINTMENT (OUTPATIENT)
Dept: LAB | Facility: MEDICAL CENTER | Age: 57
End: 2022-11-21

## 2022-11-21 DIAGNOSIS — N89.8 VAGINAL MASS: ICD-10-CM

## 2022-11-21 LAB
ALBUMIN SERPL BCP-MCNC: 3.2 G/DL (ref 3.5–5)
ALP SERPL-CCNC: 90 U/L (ref 46–116)
ALT SERPL W P-5'-P-CCNC: 17 U/L (ref 12–78)
ANION GAP SERPL CALCULATED.3IONS-SCNC: 3 MMOL/L (ref 4–13)
AST SERPL W P-5'-P-CCNC: 12 U/L (ref 5–45)
BASOPHILS # BLD AUTO: 0.09 THOUSANDS/ÂΜL (ref 0–0.1)
BASOPHILS NFR BLD AUTO: 1 % (ref 0–1)
BILIRUB SERPL-MCNC: 0.79 MG/DL (ref 0.2–1)
BUN SERPL-MCNC: 18 MG/DL (ref 5–25)
CALCIUM ALBUM COR SERPL-MCNC: 9.9 MG/DL (ref 8.3–10.1)
CALCIUM SERPL-MCNC: 9.3 MG/DL (ref 8.3–10.1)
CHLORIDE SERPL-SCNC: 109 MMOL/L (ref 96–108)
CO2 SERPL-SCNC: 28 MMOL/L (ref 21–32)
CREAT SERPL-MCNC: 0.77 MG/DL (ref 0.6–1.3)
EOSINOPHIL # BLD AUTO: 0.18 THOUSAND/ÂΜL (ref 0–0.61)
EOSINOPHIL NFR BLD AUTO: 2 % (ref 0–6)
ERYTHROCYTE [DISTWIDTH] IN BLOOD BY AUTOMATED COUNT: 13.3 % (ref 11.6–15.1)
EST. AVERAGE GLUCOSE BLD GHB EST-MCNC: 126 MG/DL
GFR SERPL CREATININE-BSD FRML MDRD: 85 ML/MIN/1.73SQ M
GLUCOSE P FAST SERPL-MCNC: 123 MG/DL (ref 65–99)
HBA1C MFR BLD: 6 %
HCT VFR BLD AUTO: 44 % (ref 34.8–46.1)
HGB BLD-MCNC: 14.5 G/DL (ref 11.5–15.4)
IMM GRANULOCYTES # BLD AUTO: 0.04 THOUSAND/UL (ref 0–0.2)
IMM GRANULOCYTES NFR BLD AUTO: 0 % (ref 0–2)
LYMPHOCYTES # BLD AUTO: 2.85 THOUSANDS/ÂΜL (ref 0.6–4.47)
LYMPHOCYTES NFR BLD AUTO: 29 % (ref 14–44)
MCH RBC QN AUTO: 40.2 PG (ref 26.8–34.3)
MCHC RBC AUTO-ENTMCNC: 33 G/DL (ref 31.4–37.4)
MCV RBC AUTO: 122 FL (ref 82–98)
MONOCYTES # BLD AUTO: 0.41 THOUSAND/ÂΜL (ref 0.17–1.22)
MONOCYTES NFR BLD AUTO: 4 % (ref 4–12)
NEUTROPHILS # BLD AUTO: 6.36 THOUSANDS/ÂΜL (ref 1.85–7.62)
NEUTS SEG NFR BLD AUTO: 64 % (ref 43–75)
NRBC BLD AUTO-RTO: 0 /100 WBCS
PLATELET # BLD AUTO: 288 THOUSANDS/UL (ref 149–390)
PMV BLD AUTO: 9.9 FL (ref 8.9–12.7)
POTASSIUM SERPL-SCNC: 4.2 MMOL/L (ref 3.5–5.3)
PROT SERPL-MCNC: 7.4 G/DL (ref 6.4–8.4)
RBC # BLD AUTO: 3.61 MILLION/UL (ref 3.81–5.12)
SODIUM SERPL-SCNC: 140 MMOL/L (ref 135–147)
WBC # BLD AUTO: 9.93 THOUSAND/UL (ref 4.31–10.16)

## 2022-11-22 DIAGNOSIS — E78.2 MIXED HYPERLIPIDEMIA: ICD-10-CM

## 2022-11-22 LAB — BACTERIA UR CULT: NORMAL

## 2022-11-22 RX ORDER — SIMVASTATIN 20 MG
20 TABLET ORAL
Qty: 90 TABLET | Refills: 3 | Status: SHIPPED | OUTPATIENT
Start: 2022-11-22

## 2022-11-28 ENCOUNTER — TELEPHONE (OUTPATIENT)
Dept: UROLOGY | Facility: CLINIC | Age: 57
End: 2022-11-28

## 2022-11-28 ENCOUNTER — HOSPITAL ENCOUNTER (OUTPATIENT)
Facility: HOSPITAL | Age: 57
Setting detail: OUTPATIENT SURGERY
Discharge: HOME | End: 2022-11-28
Attending: UROLOGY | Admitting: UROLOGY

## 2022-11-28 ENCOUNTER — ANESTHESIA EVENT (OUTPATIENT)
Dept: PERIOP | Facility: HOSPITAL | Age: 57
End: 2022-11-28

## 2022-11-28 ENCOUNTER — APPOINTMENT (OUTPATIENT)
Dept: RADIOLOGY | Facility: HOSPITAL | Age: 57
End: 2022-11-28

## 2022-11-28 ENCOUNTER — ANESTHESIA (OUTPATIENT)
Dept: PERIOP | Facility: HOSPITAL | Age: 57
End: 2022-11-28

## 2022-11-28 VITALS
OXYGEN SATURATION: 96 % | HEART RATE: 71 BPM | HEIGHT: 63 IN | DIASTOLIC BLOOD PRESSURE: 55 MMHG | TEMPERATURE: 98 F | SYSTOLIC BLOOD PRESSURE: 112 MMHG | WEIGHT: 155 LBS | RESPIRATION RATE: 16 BRPM | BODY MASS INDEX: 27.46 KG/M2

## 2022-11-28 DIAGNOSIS — N39.41 URGE INCONTINENCE: ICD-10-CM

## 2022-11-28 DIAGNOSIS — S37.20XS: ICD-10-CM

## 2022-11-28 DIAGNOSIS — M79.89 LEG SWELLING: Primary | ICD-10-CM

## 2022-11-28 DIAGNOSIS — N89.8 VAGINAL MASS: ICD-10-CM

## 2022-11-28 DIAGNOSIS — N36.9 URETHRAL DISORDER: Primary | ICD-10-CM

## 2022-11-28 LAB — GLUCOSE SERPL-MCNC: 106 MG/DL (ref 65–140)

## 2022-11-28 RX ORDER — LIDOCAINE HYDROCHLORIDE AND EPINEPHRINE 10; 10 MG/ML; UG/ML
INJECTION, SOLUTION INFILTRATION; PERINEURAL AS NEEDED
Status: DISCONTINUED | OUTPATIENT
Start: 2022-11-28 | End: 2022-11-28 | Stop reason: HOSPADM

## 2022-11-28 RX ORDER — FENTANYL CITRATE 50 UG/ML
INJECTION, SOLUTION INTRAMUSCULAR; INTRAVENOUS AS NEEDED
Status: DISCONTINUED | OUTPATIENT
Start: 2022-11-28 | End: 2022-11-28

## 2022-11-28 RX ORDER — OXYBUTYNIN CHLORIDE 5 MG/1
5 TABLET ORAL 3 TIMES DAILY PRN
Status: DISCONTINUED | OUTPATIENT
Start: 2022-11-28 | End: 2022-11-28 | Stop reason: HOSPADM

## 2022-11-28 RX ORDER — MAGNESIUM HYDROXIDE 1200 MG/15ML
LIQUID ORAL AS NEEDED
Status: DISCONTINUED | OUTPATIENT
Start: 2022-11-28 | End: 2022-11-28 | Stop reason: HOSPADM

## 2022-11-28 RX ORDER — DEXAMETHASONE SODIUM PHOSPHATE 10 MG/ML
INJECTION, SOLUTION INTRAMUSCULAR; INTRAVENOUS AS NEEDED
Status: DISCONTINUED | OUTPATIENT
Start: 2022-11-28 | End: 2022-11-28

## 2022-11-28 RX ORDER — FENTANYL CITRATE/PF 50 MCG/ML
25 SYRINGE (ML) INJECTION
Status: DISCONTINUED | OUTPATIENT
Start: 2022-11-28 | End: 2022-11-28 | Stop reason: HOSPADM

## 2022-11-28 RX ORDER — PROPOFOL 10 MG/ML
INJECTION, EMULSION INTRAVENOUS AS NEEDED
Status: DISCONTINUED | OUTPATIENT
Start: 2022-11-28 | End: 2022-11-28

## 2022-11-28 RX ORDER — HYDROMORPHONE HCL IN WATER/PF 6 MG/30 ML
0.2 PATIENT CONTROLLED ANALGESIA SYRINGE INTRAVENOUS
Status: DISCONTINUED | OUTPATIENT
Start: 2022-11-28 | End: 2022-11-28 | Stop reason: HOSPADM

## 2022-11-28 RX ORDER — PROPOFOL 10 MG/ML
INJECTION, EMULSION INTRAVENOUS CONTINUOUS PRN
Status: DISCONTINUED | OUTPATIENT
Start: 2022-11-28 | End: 2022-11-28

## 2022-11-28 RX ORDER — ONDANSETRON 2 MG/ML
INJECTION INTRAMUSCULAR; INTRAVENOUS AS NEEDED
Status: DISCONTINUED | OUTPATIENT
Start: 2022-11-28 | End: 2022-11-28

## 2022-11-28 RX ORDER — HYDROCODONE BITARTRATE AND ACETAMINOPHEN 5; 325 MG/1; MG/1
1 TABLET ORAL EVERY 6 HOURS PRN
Status: DISCONTINUED | OUTPATIENT
Start: 2022-11-28 | End: 2022-11-28 | Stop reason: HOSPADM

## 2022-11-28 RX ORDER — MIDAZOLAM HYDROCHLORIDE 2 MG/2ML
INJECTION, SOLUTION INTRAMUSCULAR; INTRAVENOUS AS NEEDED
Status: DISCONTINUED | OUTPATIENT
Start: 2022-11-28 | End: 2022-11-28

## 2022-11-28 RX ORDER — ONDANSETRON 2 MG/ML
4 INJECTION INTRAMUSCULAR; INTRAVENOUS ONCE AS NEEDED
Status: DISCONTINUED | OUTPATIENT
Start: 2022-11-28 | End: 2022-11-28 | Stop reason: HOSPADM

## 2022-11-28 RX ORDER — LEVOFLOXACIN 5 MG/ML
750 INJECTION, SOLUTION INTRAVENOUS ONCE
Status: COMPLETED | OUTPATIENT
Start: 2022-11-28 | End: 2022-11-28

## 2022-11-28 RX ORDER — SODIUM CHLORIDE, SODIUM LACTATE, POTASSIUM CHLORIDE, CALCIUM CHLORIDE 600; 310; 30; 20 MG/100ML; MG/100ML; MG/100ML; MG/100ML
125 INJECTION, SOLUTION INTRAVENOUS CONTINUOUS
Status: DISCONTINUED | OUTPATIENT
Start: 2022-11-28 | End: 2022-11-28 | Stop reason: HOSPADM

## 2022-11-28 RX ORDER — LEVOFLOXACIN 500 MG/1
500 TABLET, FILM COATED ORAL EVERY 24 HOURS
Qty: 3 TABLET | Refills: 0 | Status: SHIPPED | OUTPATIENT
Start: 2022-11-28 | End: 2022-12-01

## 2022-11-28 RX ORDER — LIDOCAINE HYDROCHLORIDE 20 MG/ML
INJECTION, SOLUTION EPIDURAL; INFILTRATION; INTRACAUDAL; PERINEURAL AS NEEDED
Status: DISCONTINUED | OUTPATIENT
Start: 2022-11-28 | End: 2022-11-28

## 2022-11-28 RX ORDER — HYDROCODONE BITARTRATE AND ACETAMINOPHEN 5; 325 MG/1; MG/1
1 TABLET ORAL EVERY 4 HOURS PRN
Qty: 20 TABLET | Refills: 0 | Status: SHIPPED | OUTPATIENT
Start: 2022-11-28 | End: 2022-12-08

## 2022-11-28 RX ORDER — SODIUM CHLORIDE, SODIUM LACTATE, POTASSIUM CHLORIDE, CALCIUM CHLORIDE 600; 310; 30; 20 MG/100ML; MG/100ML; MG/100ML; MG/100ML
INJECTION, SOLUTION INTRAVENOUS CONTINUOUS PRN
Status: DISCONTINUED | OUTPATIENT
Start: 2022-11-28 | End: 2022-11-28

## 2022-11-28 RX ADMIN — PROPOFOL 180 MG: 10 INJECTION, EMULSION INTRAVENOUS at 11:34

## 2022-11-28 RX ADMIN — OXYBUTYNIN CHLORIDE 5 MG: 5 TABLET ORAL at 13:18

## 2022-11-28 RX ADMIN — SODIUM CHLORIDE, SODIUM LACTATE, POTASSIUM CHLORIDE, AND CALCIUM CHLORIDE: .6; .31; .03; .02 INJECTION, SOLUTION INTRAVENOUS at 11:04

## 2022-11-28 RX ADMIN — LEVOFLOXACIN: 750 INJECTION, SOLUTION INTRAVENOUS at 11:25

## 2022-11-28 RX ADMIN — FENTANYL CITRATE 25 MCG: 50 INJECTION, SOLUTION INTRAMUSCULAR; INTRAVENOUS at 13:15

## 2022-11-28 RX ADMIN — PROPOFOL 20 MG: 10 INJECTION, EMULSION INTRAVENOUS at 12:10

## 2022-11-28 RX ADMIN — PROPOFOL 20 MG: 10 INJECTION, EMULSION INTRAVENOUS at 12:15

## 2022-11-28 RX ADMIN — FENTANYL CITRATE 50 MCG: 50 INJECTION, SOLUTION INTRAMUSCULAR; INTRAVENOUS at 11:34

## 2022-11-28 RX ADMIN — ONDANSETRON 4 MG: 2 INJECTION INTRAMUSCULAR; INTRAVENOUS at 11:30

## 2022-11-28 RX ADMIN — PROPOFOL 30 MG: 10 INJECTION, EMULSION INTRAVENOUS at 11:45

## 2022-11-28 RX ADMIN — Medication 20 MG: at 11:49

## 2022-11-28 RX ADMIN — PROPOFOL 100 MCG/KG/MIN: 10 INJECTION, EMULSION INTRAVENOUS at 11:38

## 2022-11-28 RX ADMIN — DEXAMETHASONE SODIUM PHOSPHATE 10 MG: 10 INJECTION, SOLUTION INTRAMUSCULAR; INTRAVENOUS at 11:37

## 2022-11-28 RX ADMIN — PROPOFOL 20 MG: 10 INJECTION, EMULSION INTRAVENOUS at 12:18

## 2022-11-28 RX ADMIN — PROPOFOL 20 MG: 10 INJECTION, EMULSION INTRAVENOUS at 12:04

## 2022-11-28 RX ADMIN — PROPOFOL 20 MG: 10 INJECTION, EMULSION INTRAVENOUS at 12:06

## 2022-11-28 RX ADMIN — LIDOCAINE HYDROCHLORIDE 100 MG: 20 INJECTION, SOLUTION EPIDURAL; INFILTRATION; INTRACAUDAL at 11:34

## 2022-11-28 RX ADMIN — FENTANYL CITRATE 50 MCG: 50 INJECTION, SOLUTION INTRAMUSCULAR; INTRAVENOUS at 11:47

## 2022-11-28 RX ADMIN — Medication 10 MG: at 12:05

## 2022-11-28 RX ADMIN — SODIUM CHLORIDE, SODIUM LACTATE, POTASSIUM CHLORIDE, AND CALCIUM CHLORIDE: .6; .31; .03; .02 INJECTION, SOLUTION INTRAVENOUS at 11:44

## 2022-11-28 RX ADMIN — NOREPINEPHRINE BITARTRATE 2 MCG/MIN: 1 INJECTION, SOLUTION, CONCENTRATE INTRAVENOUS at 12:01

## 2022-11-28 RX ADMIN — MIDAZOLAM HYDROCHLORIDE 2 MG: 1 INJECTION, SOLUTION INTRAMUSCULAR; INTRAVENOUS at 11:30

## 2022-11-28 NOTE — TELEPHONE ENCOUNTER
----- Message from Javier Raphael MD sent at 11/28/2022 12:48 PM EST -----  Please call patient to arrange cystogram in 2 weeks in Radiology with fluoroscopy-the order has been placed  She has a De Souza catheter  See us shortly after that

## 2022-11-28 NOTE — ANESTHESIA POSTPROCEDURE EVALUATION
Post-Op Assessment Note    CV Status:  Stable  Pain Score: 0    Pain management: adequate  Multimodal analgesia used between 6 hours prior to anesthesia start to PACU discharge    Mental Status:  Sleepy and arousable   Hydration Status:  Euvolemic   PONV Controlled:  Controlled   Airway Patency:  Patent      Post Op Vitals Reviewed: Yes      Staff: Anesthesiologist, CRNA         No notable events documented      /53 (11/28/22 1250)    Temp 98 2 °F (36 8 °C) (11/28/22 1250)    Pulse 94 (11/28/22 1250)   Resp 20 (11/28/22 1250)    SpO2 97 % (11/28/22 1250)

## 2022-11-28 NOTE — OP NOTE
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    Operative Findings:   2-3 centimeter mass, extending from distal urethra to bladder neck, excised  The bladder was entered just proximal to the bladder neck for a 1 centimeter injury, which was closed primarily  Complications:   Cystotomy at the 6 o'clock position just proximal to the bladder neck, closed primarily    Procedure and Technique:  Patient with a history of angiofibroma of the periurethral area, status post open wedge biopsy by me in the past   This continues to grow  She has been evaluated by Gynecologic Oncology  Because the mass continues to grow, although it is benign I suggested excision because it is growing and causes some urethral/voiding difficulties  The risks of bleeding, infection, vesicle vaginal fistula, bladder and urethral injury and recurrence and inability to remove the entire mass were explained she gives informed consent  The patient was brought to the operating room identified properly  LMA was induced the patient was prepped and draped in the dorsal lithotomy position usual fashion  A time-out was performed  A 16 Burmese De Souza catheter was placed in the bladder was drained  A weighted vaginal speculum was placed  The mass was noted and Allis clamps were placed at the urethral meatus to provide traction    A [place] retractor was placed to provide horizontal retraction  1 percent xylocaine with epinephrine was injected in vertical fashion from the urethral meatus down to the bladder neck  I then took a 15 blade made an incision of approximately 2 to 3 centimeters from the periurethral mucosa down to the bladder neck  Using Metzenbaum scissors, I freed the vaginal mucosa away from the mass and then dissected out the mass using Metzenbaum scissors from the urethra a  Care was taken not to enter the urethra  I grasped the mass and used traction to help pull it away and this was a white fibrous mass of about 3 centimeters  At the 6 o'clock position of the area proximal just to the bladder neck, there was a cystotomy made  Once I removed the mass and sent off, I closed this primarily by advancing bladder mucosa with 3-0 Monocryl sutures for complete closure which was water tight I performed rigid cystoscopy and saw evidence of injury, and saw some leakage and I closed that further until there was no further leakage  The orifices were away from the injury  I then replaced De Souza catheter, and I closed the vaginal mucosa with running 2-0 Vicryl suture in a locking fashion  Once this was done I packed the vagina with KY soaked gauze, which was Kerlix with the tail coming out of the vagina  The De Souza catheter was placed to a leg bag  Hemostasis was excellent, and all sponge needle counts were correct at the end of the procedure     I was present for the entire procedure and A qualified resident physician was not available    Patient Disposition:  PACU  and extubated and stable        SIGNATURE: Marquis Coto MD  DATE: November 28, 2022  TIME: 11:09 AM

## 2022-11-28 NOTE — DISCHARGE INSTRUCTIONS
Expect to see blood in the urine, and have bleeding per vagina and leakage  Remove the vaginal packing in the morning simply by pulling on the packing and removed all the way  If you have trouble with this let us know we will pull it up for you  We will arrange for you to have a cystogram at the hospital in Radiology to make sure there is no leakage of dye in 2 weeks  You will wear the catheter until then  If there is no leakage of dye in 2 weeks  we will remove the catheter, but if there is leakage we will need to keep it in longer  No heavy lifting for 2 weeks  No driving if taking narcotics and no driving at all today  Call for fever greater than 101 5°, severe pain not relieved by pain medications, excessive vaginal bleeding  Take over-the-counter remedies to avoid constipation  Cigarette Smoking and Your Health   AMBULATORY CARE:   Risks to your health if you smoke:  Nicotine and other chemicals found in tobacco and e-cigarettes can damage every cell in your body  Even if you are a light smoker, you have an increased risk for cancer, heart disease, and lung disease  If you are pregnant or have diabetes, smoking increases your risk for complications  Nicotine can affect an adolescent's developing brain  This can lead to trouble thinking, learning, or paying attention  Benefits to your health if you stop smoking: You decrease respiratory symptoms such as coughing, wheezing, and shortness of breath  You reduce your risk for cancers of the lung, mouth, throat, kidney, bladder, pancreas, stomach, and cervix  If you already have cancer, you increase the benefits of chemotherapy  You also reduce your risk for cancer returning or a second cancer from developing  You reduce your risk for heart disease, blood clots, heart attack, and stroke  You reduce your risk for lung infections, and diseases such as pneumonia, asthma, chronic bronchitis, and emphysema  Your circulation improves   More oxygen can be delivered to your body  If you have diabetes, you lower your risk for complications, such as kidney, artery, and eye diseases  You also lower your risk for nerve damage  Nerve damage can lead to amputations, poor vision, and blindness  You improve your body's ability to heal and to fight infections  An adolescent can help his or her brain and body develop in a healthy way  Talk to your adolescent about all the health risks of nicotine  If you can, start talking about nicotine when your child is younger than 12 years  This may make it easier for him or her not to start using nicotine as a teenager or adult  Explain to him or her that it is best never to start  It can be hard to try to quit later  Benefits to the health of others if you stop smoking:  Tobacco is harmful to nonsmokers who breathe in your secondhand smoke  The following are ways the health of others around you may improve when you stop smoking: You lower the risks for lung cancer and heart disease in nonsmoking adults  If you are pregnant, you lower the risk for miscarriage, early delivery, low birth weight, and stillbirth  You also lower your baby's risk for SIDS, obesity, developmental delay, and neurobehavioral problems, such as ADHD  If you have children, you lower their risk for ear infections, colds, pneumonia, bronchitis, and asthma  Follow up with your doctor as directed:  Write down your questions so you remember to ask them during your visits  For support and more information:   American [employer] Association  [address] [facility]  [address]  Phone: [facility] [place] [address]  Phone: 2- 053 - 519-6742  Web Address: [employer]cos  org    Smokefree  gov  Phone: 9- 231 - 175-0266  Web Address: www smokefree    [facility] 2022 Information is for End User's use only and may not be sold, redistributed or otherwise used for commercial purposes   All illustrations and images included in CareNotes® are the copyrighted property of A D A M , Inc  or [facility] [address]   The above information is an  only  It is not intended as medical advice for individual conditions or treatments  Talk to your doctor, nurse or pharmacist before following any medical regimen to see if it is safe and effective for you

## 2022-11-28 NOTE — H&P
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  We discussed removal of this  I believe she may benefit from debulking this and sparing the urethra  There is a chance of recurrence giving that this is a keloid type tumor and she is aware of this  The risks of bleeding infection vesicle vaginal fistula or urethral vaginal fistula, recurrence have been explained  We will obtain vascular surgery consultation and Cardiology consultation for preoperative clearance beforehand                      Chief Complaint          Chief Complaint   Patient presents with   • Follow-up         History of Present Illness   Periurethral mass-status post open biopsy by me which formed after mid urethral sling placement elsewhere years ago  This was biopsied by me 3/17/2021 which was a wedge biopsy and pathology showed no malignancy, just fibrovascular smooth muscle proliferation consistent with angiofibroma  I saw her 2021 and there was persistently large mass, proximally 2 cm involving the urethra  CT scan 3/28/2022 shows urethral mass present, unchanged in appearance since prior studies  She has seen Gynecologic Oncology  The pain is pretty significant  She also is having more difficulty urinating  She has to lean forward to get her stream going  She is having urinary frequency, 12 13 times in a 4 hour  She sleeps through the night because she takes Excedrin and Benadryl at night  No gross hematuria slight dysuria      She has significant vascular disease history with the stent in her left iliac artery  She will need preoperative vascular surgery clearance as well as cardiology clearance      The following portions of the patient's history were reviewed and updated as appropriate: allergies, current medications, past family history, past medical history, past social history, past surgical history and problem list        Past Medical History:   Diagnosis Date   • Arthritis     • Cancer ([address] )        chronic leukemia   • Chronic kidney disease       cyst in left kidney  • Diabetes mellitus (HCC)     • Frequent sinus infections       Pt states she has a mass in left side of sinus   • GERD (gastroesophageal reflux disease)     • History of echocardiogram 11/19/2008     Normal    • History of nuclear stress test 11/19/2008     EF 66%, No evidence for ischemia     • Hyperlipidemia     • Hypertension     • Missing tooth, acquired       Pt reports losing a tooth d/t chemotherapy   • PONV (postoperative nausea and vomiting)     • Tumor       Urethral   • Wears glasses              Past Surgical History:   Procedure Laterality Date   • APPENDECTOMY       • BLADDER AUGMENTATION       • CHOLECYSTECTOMY       • COLONOSCOPY       • CYSTOSCOPY   12/28/2020   • HERNIA REPAIR Right       inguinal   • HYSTERECTOMY       • IR AORTAGRAM WITH RUN-OFF   6/14/2019   • KNEE ARTHROPLASTY       • KNEE ARTHROSCOPY Bilateral     • MA COLONOSCOPY FLX DX W/COLLJ SPEC WHEN PFRMD N/A 1/23/2018     Procedure: COLONOSCOPY;  Surgeon: Burgess Josesito DO;  Location: [facility];  Service: Gastroenterology   • MA SLCTV CATHJ 3RD+ ORD SLCTV ABDL PEL/TR [facility] Left 6/14/2019     Procedure: ARTERIOGRAM-arteriography and possible endovascular intervention ;  Surgeon: Ana María Mckee MD;  Location:  MAIN OR;  Service: Vascular   • PUBOVAGINAL SLING N/A 3/17/2021     Procedure: Open transvaginal urethral biopsy;  Surgeon: Audie Daniels MD;  Location:  MAIN OR;  Service: Urology   • REDUCTION MAMMAPLASTY       • SHOULDER SURGERY       • WISDOM TOOTH EXTRACTION          shoulder  Review of Systems   Review of Systems   Constitutional: Negative for fever  Respiratory: Negative for shortness of breath  Cardiovascular: Negative for chest pain  Gastrointestinal: Negative  Genitourinary: Positive for difficulty urinating, dysuria, frequency, pelvic pain, urgency and vaginal pain  Negative for hematuria  Musculoskeletal: Negative for back pain          Active Problem List          Patient Active Problem List   Diagnosis   • Polycythemia vera ([facility] [address] )   • Type 2 diabetes mellitus with hyperglycemia, without long-term current use of insulin (HCC)   • Acute cystitis without hematuria   • Lactic acidosis   • Elevated MCV   • Hypercholesterolemia   • Female bladder prolapse   • Tobacco use   • Leukocytosis   • GI bleed   • Hepatic steatosis   • Essential hypertension   • Aortoiliac occlusive disease (HCC)   • Chest pain with moderate risk for cardiac etiology   • Urethral disorder         Objective   Ht 5' 3" (1 6 m)   Wt 71 2 kg (157 lb)   BMI 27 81 kg/m²      Physical Exam  Vitals reviewed  Constitutional:       Appearance: Normal appearance  HENT:      Head: Normocephalic and atraumatic  Eyes:      Extraocular Movements: Extraocular movements intact  Cardiovascular:      Rate and Rhythm: Normal rate and regular rhythm  Pulses: Normal pulses  Pulmonary:      Effort: No respiratory distress  Breath sounds: No stridor  Genitourinary:     General: Normal vulva  Comments: Soft suburethral mass as before, no change  No evidence of carcinoma of the vulva or vagina itself  Musculoskeletal:         General: Normal range of motion  Cervical back: Normal range of motion  Skin:     Coloration: Skin is not jaundiced or pale  Neurological:      General: No focal deficit present  Mental Status: She is alert and oriented to person, place, and time     Psychiatric:         Mood and Affect: Mood normal          Behavior: Behavior normal          Thought Content:  Thought content normal          Judgment: Judgment normal                   Current Medications      Current Outpatient Medications:   •  aspirin (ECOTRIN LOW STRENGTH) 81 mg EC tablet, Take 81 mg by mouth daily, Disp: , Rfl:   •  Dulaglutide (Trulicity) 2 72 RG/6 8IL SOPN, Inject 0 5 mL (0 75 mg total) under the skin once a week, Disp: 2 mL, Rfl: 5  •  Empagliflozin (Jardiance) 10 MG TABS, Take 1 tablet (10 mg total) by mouth every morning, Disp: 90 tablet, Rfl: 3  •  famotidine (PEPCID) 40 MG tablet, Take 1 tablet (40 mg total) by mouth daily, Disp: 90 tablet, Rfl: 3  •  fluticasone (FLONASE) 50 mcg/act nasal spray, 2 sprays into each nostril daily, Disp: 16 g, Rfl: 1  •  hydroxyurea (HYDREA) 500 mg capsule, TAKE 1 CAPSULE BY MOUTH ON ODD NUMBERED DAYS AND 2 CAPSULES BY MOUTH ON EVEN NUMBERED DAYS, Disp: 45 capsule, Rfl: 3  •  losartan (COZAAR) 25 mg tablet, Take 1 tablet (25 mg total) by mouth daily, Disp: 90 tablet, Rfl: 3  •  nitrofurantoin (MACRODANTIN) 50 mg capsule, Take 1 capsule (50 mg total) by mouth daily, Disp: 30 capsule, Rfl: 3  •  ondansetron (ZOFRAN-ODT) 4 mg disintegrating tablet, Take 1 tablet (4 mg total) by mouth every 6 (six) hours as needed for nausea or vomiting, Disp: 20 tablet, Rfl: 0  •  oxybutynin (DITROPAN-XL) 10 MG 24 hr tablet, Take 1 tablet (10 mg total) by mouth daily at bedtime, Disp: 30 tablet, Rfl: 12  •  simvastatin (ZOCOR) 20 mg tablet, Take 1 tablet (20 mg total) by mouth daily at bedtime, Disp: 90 tablet, Rfl: 3           Alyssa Garcia MD                  Electronically signed by Alyssa Garcia MD at 6/30/2022  3:32 PM      Office Visit on 6/30/2022     Office Visit on 6/30/2022      Note shared with patient  Additional Documentation    Vitals:   /82   Pulse 74   Ht 5' 3" (1 6 m)   Wt 71 2 kg (157 lb)   BMI 27 81 kg/m²   BSA 1 74 m²      More Vitals   SmartForms:    NEO PRE-CHARTING •   [facility]  -  [facility] PCMH/PCSP WRAP UP REQUIREMENTS ADVANCED      Encounter Info:   Billing Info,   History,   Allergies,   Detailed Report        Orders Placed       Urine culture  Medication Changes       traMADol HCl 50 mg Oral Every 6 hours PRN     Medication List     Visit Diagnoses       Vaginal mass     Pelvic pain     Problem List

## 2022-11-28 NOTE — ANESTHESIA PREPROCEDURE EVALUATION
Procedure:  CYSTOSCOPY, excision of vaginal/periurethral mass transvaginally (Ureter)    Relevant Problems   CARDIO   (+) Aortoiliac occlusive disease (HCC)   (+) Chest pain with moderate risk for cardiac etiology   (+) Essential hypertension   (+) Hypercholesterolemia      ENDO   (+) Type 2 diabetes mellitus with hyperglycemia, without long-term current use of insulin (HCC)      GI/HEPATIC   (+) GI bleed   (+) Hepatic steatosis      Other   (+) Polycythemia vera (HCC)        Physical Exam    Airway    Mallampati score: II         Dental   No notable dental hx     Cardiovascular  Rhythm: regular, Rate: normal,     Pulmonary  Breath sounds clear to auscultation,     Other Findings        Anesthesia Plan  ASA Score- 3     Anesthesia Type- general with ASA Monitors  Additional Monitors:   Airway Plan: LMA  Plan Factors-Exercise tolerance (METS): >4 METS  Chart reviewed  EKG reviewed  Existing labs reviewed  Patient summary reviewed  Patient is not a current smoker  Induction- intravenous  Postoperative Plan-     Informed Consent- Anesthetic plan and risks discussed with patient  I personally reviewed this patient with the CRNA  Discussed and agreed on the Anesthesia Plan with the CRNA  Livier Iglesias

## 2022-11-29 NOTE — TELEPHONE ENCOUNTER
Post Op Note    Nia Napier is a 62 y o  female s/p CYSTOSCOPY, excision of vaginal/periurethral mass transvaginally (Ureter) performed 11/28/2022  Nia Napier is a patient of Dr Cass Espino and is seen at the [facility] office  How would you rate your pain on a scale from 1 to 10, 10 being the worst pain ever? 0- patient denies pain, just has some soreness   Have you had a fever? No    Do you have any difficulty urinating? No    If the patient has a loving- are you comfortable caring for your loving? Yes Is it draining urine? Yes     Do you have any other questions or concerns that I can address at this time? Patient advised that per AVS can remove vaginal packing, simply by pulling on the packing and removing it in entirety  Advised to contact the office with any issues removing packing  Patient has central scheduling phone number  She will contact them to schedule cystogram in 2 weeks   Will monitor for cystogram date, then contact patient to schedule appointment shortly after cystogram

## 2022-12-01 NOTE — TELEPHONE ENCOUNTER
Called and spoke with Gus Payne  She scheduled her cystogram for 12/19/22  Patient scheduled for follow up with Dr Christiano Garcia to review results of cystogram on 12/28/22 in [place]

## 2022-12-01 NOTE — TELEPHONE ENCOUNTER
Called spoke with patient reports swelling in both legs, also having L hip pain  L is more swollen than right  Denies any areas warm to touch or red  States feels very dry and scaly  Did try elevating them but not helping  Reports that the swelling started last night, getting worse  Reports that she is up and walking around   Advised to continue to elevate and will forward message to provider

## 2022-12-05 ENCOUNTER — APPOINTMENT (EMERGENCY)
Dept: CT IMAGING | Facility: HOSPITAL | Age: 57
End: 2022-12-05

## 2022-12-05 ENCOUNTER — APPOINTMENT (EMERGENCY)
Dept: RADIOLOGY | Facility: HOSPITAL | Age: 57
End: 2022-12-05

## 2022-12-05 ENCOUNTER — HOSPITAL ENCOUNTER (EMERGENCY)
Facility: HOSPITAL | Age: 57
Discharge: HOME | End: 2022-12-05
Attending: EMERGENCY MEDICINE

## 2022-12-05 ENCOUNTER — TELEPHONE (OUTPATIENT)
Dept: FAMILY MEDICINE CLINIC | Facility: CLINIC | Age: 57
End: 2022-12-05

## 2022-12-05 ENCOUNTER — APPOINTMENT (OUTPATIENT)
Dept: NON INVASIVE DIAGNOSTICS | Facility: HOSPITAL | Age: 57
End: 2022-12-05

## 2022-12-05 VITALS
BODY MASS INDEX: 27.45 KG/M2 | DIASTOLIC BLOOD PRESSURE: 68 MMHG | OXYGEN SATURATION: 97 % | TEMPERATURE: 97.3 F | RESPIRATION RATE: 19 BRPM | HEART RATE: 82 BPM | WEIGHT: 154.98 LBS | SYSTOLIC BLOOD PRESSURE: 154 MMHG

## 2022-12-05 DIAGNOSIS — M79.89 LEG SWELLING: Primary | ICD-10-CM

## 2022-12-05 LAB
ALBUMIN SERPL BCP-MCNC: 3.3 G/DL (ref 3.5–5)
ALP SERPL-CCNC: 98 U/L (ref 46–116)
ALT SERPL W P-5'-P-CCNC: 17 U/L (ref 12–78)
ANION GAP SERPL CALCULATED.3IONS-SCNC: 7 MMOL/L (ref 4–13)
APTT PPP: 28 SECONDS (ref 23–37)
AST SERPL W P-5'-P-CCNC: 14 U/L (ref 5–45)
BACTERIA UR QL AUTO: NORMAL /HPF
BASOPHILS # BLD AUTO: 0.05 THOUSANDS/ÂΜL (ref 0–0.1)
BASOPHILS NFR BLD AUTO: 1 % (ref 0–1)
BILIRUB DIRECT SERPL-MCNC: 0.16 MG/DL (ref 0–0.2)
BILIRUB SERPL-MCNC: 0.58 MG/DL (ref 0.2–1)
BILIRUB UR QL STRIP: NEGATIVE
BUN SERPL-MCNC: 12 MG/DL (ref 5–25)
CALCIUM SERPL-MCNC: 8.7 MG/DL (ref 8.3–10.1)
CARDIAC TROPONIN I PNL SERPL HS: 5 NG/L (ref 8–18)
CHLORIDE SERPL-SCNC: 106 MMOL/L (ref 96–108)
CK SERPL-CCNC: 64 U/L (ref 26–192)
CLARITY UR: CLEAR
CO2 SERPL-SCNC: 28 MMOL/L (ref 21–32)
COLOR UR: YELLOW
CREAT SERPL-MCNC: 0.77 MG/DL (ref 0.6–1.3)
D DIMER PPP FEU-MCNC: 0.78 UG/ML FEU
EOSINOPHIL # BLD AUTO: 0.23 THOUSAND/ÂΜL (ref 0–0.61)
EOSINOPHIL NFR BLD AUTO: 3 % (ref 0–6)
ERYTHROCYTE [DISTWIDTH] IN BLOOD BY AUTOMATED COUNT: 13 % (ref 11.6–15.1)
FLUAV RNA RESP QL NAA+PROBE: NEGATIVE
FLUBV RNA RESP QL NAA+PROBE: NEGATIVE
GFR SERPL CREATININE-BSD FRML MDRD: 85 ML/MIN/1.73SQ M
GLUCOSE SERPL-MCNC: 66 MG/DL (ref 65–140)
GLUCOSE SERPL-MCNC: 92 MG/DL (ref 65–140)
GLUCOSE UR STRIP-MCNC: ABNORMAL MG/DL
HCT VFR BLD AUTO: 38.1 % (ref 34.8–46.1)
HGB BLD-MCNC: 12.9 G/DL (ref 11.5–15.4)
HGB UR QL STRIP.AUTO: ABNORMAL
IMM GRANULOCYTES # BLD AUTO: 0.03 THOUSAND/UL (ref 0–0.2)
IMM GRANULOCYTES NFR BLD AUTO: 0 % (ref 0–2)
INR PPP: 0.99 (ref 0.84–1.19)
KETONES UR STRIP-MCNC: NEGATIVE MG/DL
LACTATE SERPL-SCNC: 1.1 MMOL/L (ref 0.5–2)
LEUKOCYTE ESTERASE UR QL STRIP: ABNORMAL
LIPASE SERPL-CCNC: 70 U/L (ref 73–393)
LYMPHOCYTES # BLD AUTO: 2.72 THOUSANDS/ÂΜL (ref 0.6–4.47)
LYMPHOCYTES NFR BLD AUTO: 30 % (ref 14–44)
MAGNESIUM SERPL-MCNC: 2.4 MG/DL (ref 1.6–2.6)
MCH RBC QN AUTO: 41.2 PG (ref 26.8–34.3)
MCHC RBC AUTO-ENTMCNC: 33.9 G/DL (ref 31.4–37.4)
MCV RBC AUTO: 122 FL (ref 82–98)
MONOCYTES # BLD AUTO: 0.43 THOUSAND/ÂΜL (ref 0.17–1.22)
MONOCYTES NFR BLD AUTO: 5 % (ref 4–12)
NEUTROPHILS # BLD AUTO: 5.48 THOUSANDS/ÂΜL (ref 1.85–7.62)
NEUTS SEG NFR BLD AUTO: 61 % (ref 43–75)
NITRITE UR QL STRIP: NEGATIVE
NON-SQ EPI CELLS URNS QL MICRO: NORMAL /HPF
NRBC BLD AUTO-RTO: 0 /100 WBCS
NT-PROBNP SERPL-MCNC: 251 PG/ML
PH UR STRIP.AUTO: 6 [PH]
PLATELET # BLD AUTO: 264 THOUSANDS/UL (ref 149–390)
PMV BLD AUTO: 9.3 FL (ref 8.9–12.7)
POTASSIUM SERPL-SCNC: 4.1 MMOL/L (ref 3.5–5.3)
PROT SERPL-MCNC: 7.3 G/DL (ref 6.4–8.4)
PROT UR STRIP-MCNC: NEGATIVE MG/DL
PROTHROMBIN TIME: 13.3 SECONDS (ref 11.6–14.5)
RBC # BLD AUTO: 3.13 MILLION/UL (ref 3.81–5.12)
RBC #/AREA URNS AUTO: NORMAL /HPF
RSV RNA RESP QL NAA+PROBE: NEGATIVE
SARS-COV-2 RNA RESP QL NAA+PROBE: NEGATIVE
SODIUM SERPL-SCNC: 141 MMOL/L (ref 135–147)
SP GR UR STRIP.AUTO: 1.01 (ref 1–1.03)
TSH SERPL DL<=0.05 MIU/L-ACNC: 0.91 UIU/ML (ref 0.45–4.5)
UROBILINOGEN UR QL STRIP.AUTO: 0.2 E.U./DL
WBC # BLD AUTO: 8.94 THOUSAND/UL (ref 4.31–10.16)
WBC #/AREA URNS AUTO: NORMAL /HPF

## 2022-12-05 RX ORDER — KETOROLAC TROMETHAMINE 30 MG/ML
15 INJECTION, SOLUTION INTRAMUSCULAR; INTRAVENOUS ONCE
Status: COMPLETED | OUTPATIENT
Start: 2022-12-05 | End: 2022-12-05

## 2022-12-05 RX ORDER — DEXTROSE AND SODIUM CHLORIDE 5; .9 G/100ML; G/100ML
125 INJECTION, SOLUTION INTRAVENOUS CONTINUOUS
Status: DISCONTINUED | OUTPATIENT
Start: 2022-12-05 | End: 2022-12-05 | Stop reason: HOSPADM

## 2022-12-05 RX ORDER — OXYBUTYNIN CHLORIDE 10 MG/1
10 TABLET, EXTENDED RELEASE ORAL
Qty: 30 TABLET | Refills: 12 | Status: SHIPPED | OUTPATIENT
Start: 2022-12-05

## 2022-12-05 RX ADMIN — IOHEXOL 100 ML: 350 INJECTION, SOLUTION INTRAVENOUS at 18:26

## 2022-12-05 RX ADMIN — DEXTROSE AND SODIUM CHLORIDE 125 ML/HR: 5; .9 INJECTION, SOLUTION INTRAVENOUS at 20:24

## 2022-12-05 RX ADMIN — KETOROLAC TROMETHAMINE 15 MG: 30 INJECTION, SOLUTION INTRAMUSCULAR; INTRAVENOUS at 18:55

## 2022-12-05 NOTE — ED NOTES
Patient transported to [facility] scan     Kathrin Mejia RN  12/05/22 2159

## 2022-12-05 NOTE — ED PROVIDER NOTES
History  Chief Complaint   Patient presents with   • Leg Swelling     Pt had recent surgery and now legs are swelling and tired     26-year-old female with history of type 2 diabetes hypertension polycythemia vera chronic leukemia the aortic occlusive disease with a stent in the left common iliac artery in 2019 underwent recent surgery with Dr Danielle Camarena of his cystoscopy excision of vaginal periurethral mass transvaginally on the left (see ureter) C on 11/28/2022  Over the last 5 days patient has been complaining of increasing lower extremity edema  With elevating of the leg she has increasing pain from the toes to the back of the knees up to the hips  With ambulation she complains of pain going from her toes to the back of the knees bilaterally  She does have a chronic indwelling De Souza and the she does she has a continue with left leg bag and she has had left greater than right swelling  She denies prior history of DVT or PE there is no history of trauma or falls she denies any back pain  She has no bowel or bladder incontinence no numbness paresthesias no weakness to the lower extremities the pain in her legs feels different than the claudication pain she had in 2019  Patient is feeling fatigued she has had occasional chills denies fever has no shortness of breath chest pain no cough or upper respiratory complaints no rashes or swollen joints  There has been no recent changes to her medications  Prior to Admission Medications   Prescriptions Last Dose Informant Patient Reported? Taking?    Dulaglutide (Trulicity) 3 24 WW/1 9GZ SOPN   No No   Sig: Inject 0 5 mL (0 75 mg total) under the skin once a week   Empagliflozin (Jardiance) 10 MG TABS   No No   Sig: Take 1 tablet (10 mg total) by mouth every morning   HYDROcodone-acetaminophen (NORCO) 5-325 mg per tablet   No No   Sig: Take 1 tablet by mouth every 4 (four) hours as needed for pain for up to 10 days Max Daily Amount: 6 tablets   aspirin (ECOTRIN LOW STRENGTH) 81 mg EC tablet   Yes No   Sig: Take 81 mg by mouth daily   famotidine (PEPCID) 40 MG tablet   No No   Sig: Take 1 tablet (40 mg total) by mouth daily   fluticasone (FLONASE) 50 mcg/act nasal spray   No No   Si sprays into each nostril daily   hydroxyurea (HYDREA) 500 mg capsule   No No   Sig: TAKE 1 CAPSULE BY MOUTH ON ODD NUMBERED DAYS AND 2 CAPSULES BY MOUTH ON EVEN NUMBERED DAYS   losartan (COZAAR) 25 mg tablet   No No   Sig: Take 1 tablet (25 mg total) by mouth daily   nitrofurantoin (MACRODANTIN) 50 mg capsule   No No   Sig: Take 1 capsule (50 mg total) by mouth daily   ondansetron (ZOFRAN-ODT) 4 mg disintegrating tablet   No No   Sig: Take 1 tablet (4 mg total) by mouth every 6 (six) hours as needed for nausea or vomiting   oxybutynin (DITROPAN-XL) 10 MG 24 hr tablet   No No   Sig: Take 1 tablet (10 mg total) by mouth daily at bedtime   simvastatin (ZOCOR) 20 mg tablet   No No   Sig: Take 1 tablet (20 mg total) by mouth daily at bedtime   traMADol (Ultram) 50 mg tablet   No No   Sig: Take 1 tablet (50 mg total) by mouth every 6 (six) hours as needed for moderate pain      Facility-Administered Medications: None       Past Medical History:   Diagnosis Date   • Arthritis    • Cancer (HCC)      chronic leukemia   • Chronic kidney disease     cyst in left kidney  • Diabetes mellitus ([facility] [address] )    • Frequent sinus infections     Pt states she has a mass in left side of sinus   • GERD (gastroesophageal reflux disease)    • History of echocardiogram 2008    Normal    • History of nuclear stress test 2008    EF 66%, No evidence for ischemia     • Hyperlipidemia    • Hypertension    • Missing tooth, acquired     Pt reports losing a tooth d/t chemotherapy   • Personal history of COVID-19 2022    mild symptoms, recovered at home   • PONV (postoperative nausea and vomiting)    • Tumor     Urethral   • Wears glasses        Past Surgical History:   Procedure Laterality Date   • APPENDECTOMY     • BLADDER AUGMENTATION     • CHOLECYSTECTOMY     • COLONOSCOPY     • CYSTOSCOPY  12/28/2020   • CYSTOSCOPY N/A 11/28/2022    Procedure: CYSTOSCOPY, excision of vaginal/periurethral mass transvaginally;  Surgeon: Edel Gilbert MD;  Location: [facility];  Service: Urology   • HERNIA REPAIR Right     inguinal   • HYSTERECTOMY     • IR AORTAGRAM WITH RUN-OFF  06/14/2019   • KNEE ARTHROSCOPY Bilateral    • MO COLONOSCOPY FLX DX W/COLLJ SPEC WHEN PFRMD N/A 01/23/2018    Procedure: COLONOSCOPY;  Surgeon: Guerda Pelayo DO;  Location: [facility];  Service: Gastroenterology   • MO Gage Nunn 3RD+ ORD SLCTV ABDL PEL/LXTR [facility] Left 06/14/2019    Procedure: ARTERIOGRAM-arteriography and possible endovascular intervention ;  Surgeon: Onel Wolf MD;  Location:  MAIN OR;  Service: Vascular   • PUBOVAGINAL SLING N/A 03/17/2021    Procedure: Open transvaginal urethral biopsy;  Surgeon: Edel Gilbert MD;  Location:  MAIN OR;  Service: Urology   • REDUCTION MAMMAPLASTY     • SHOULDER SURGERY Left    • WISDOM TOOTH EXTRACTION     • WISDOM TOOTH EXTRACTION         Family History   Problem Relation Age of Onset   • Cancer Mother    • Heart disease Father    • Diabetes Father      I have reviewed and agree with the history as documented  E-Cigarette/Vaping   • E-Cigarette Use Never User      E-Cigarette/Vaping Substances   • Nicotine No    • THC No    • CBD No    • Flavoring No    • Other No    • Unknown No      Social History     Tobacco Use   • Smoking status: Every Day     Packs/day: 0 50     Years: 35 00     Pack years: 17 50     Types: Cigarettes   • Smokeless tobacco: Never   Vaping Use   • Vaping Use: Never used   Substance Use Topics   • Alcohol use: Not Currently     Alcohol/week: 0 0 standard drinks     Comment: N/A   • Drug use: No       Review of Systems   Constitutional: Positive for activity change, chills and fatigue  Negative for diaphoresis and fever     HENT: Negative for congestion, ear pain, rhinorrhea, sneezing and sore throat  Eyes: Negative for discharge  Respiratory: Negative for cough and shortness of breath  Cardiovascular: Positive for leg swelling (left greater than right)  Negative for chest pain  Gastrointestinal: Positive for nausea  Negative for abdominal pain, blood in stool, diarrhea and vomiting  Endocrine: Negative for polyuria  Genitourinary: Positive for vaginal pain (Postoperative)  Negative for dysuria, flank pain, frequency, hematuria, urgency, vaginal bleeding and vaginal discharge  Musculoskeletal: Positive for gait problem  Negative for back pain, joint swelling, myalgias and neck pain  Skin: Negative for rash  Neurological: Negative for dizziness, weakness, light-headedness, numbness and headaches  Hematological: Negative for adenopathy  Psychiatric/Behavioral: Negative for confusion  All other systems reviewed and are negative  Physical Exam  Physical Exam  Vitals and nursing note reviewed  Constitutional:       General: She is not in acute distress  Appearance: She is well-developed  She is not diaphoretic  HENT:      Head: Normocephalic and atraumatic  Right Ear: Tympanic membrane and external ear normal       Left Ear: Tympanic membrane and external ear normal       Nose: Nose normal       Mouth/Throat:      Mouth: Oropharynx is clear and moist  Mucous membranes are moist       Pharynx: No oropharyngeal exudate or posterior oropharyngeal erythema  Eyes:      General:         Right eye: No discharge  Left eye: No discharge  Extraocular Movements: Extraocular movements intact and EOM normal       Conjunctiva/sclera: Conjunctivae normal       Pupils: Pupils are equal, round, and reactive to light  Neck:      Comments: No midline or paraspinous tenderness  Cardiovascular:      Rate and Rhythm: Normal rate and regular rhythm  Pulses: Intact distal pulses  Heart sounds: Normal heart sounds     Pulmonary: Effort: Pulmonary effort is normal  No respiratory distress  Breath sounds: Normal breath sounds  No stridor  No wheezing, rhonchi or rales  Abdominal:      General: Bowel sounds are normal  There is no distension  Palpations: Abdomen is soft  Tenderness: There is no abdominal tenderness  There is no right CVA tenderness, left CVA tenderness, guarding or rebound  Comments: Back no midline or CVA tenderness   Musculoskeletal:         General: No tenderness or deformity  Normal range of motion  Cervical back: Normal range of motion and neck supple  No rigidity or tenderness  Right lower leg: No edema  Left lower leg: Edema present  Comments: Dopperled lower ext pulses a triphasic anterior tibial on right a triphasic posterior tibial on left; patient has 2+ pedal edema on the left with pretibial edema the leg bag is above and below the knee the right leg does not have any discernible edema; pelvis is stable to rock heel strike is negative bilaterally there is no greater trochanter tenderness   Skin:     General: Skin is warm and dry  Capillary Refill: Capillary refill takes less than 2 seconds  Neurological:      General: No focal deficit present  Mental Status: She is alert and oriented to person, place, and time  Cranial Nerves: No cranial nerve deficit  Sensory: No sensory deficit  Motor: No weakness or abnormal muscle tone  Coordination: Coordination normal       Gait: Gait normal       Deep Tendon Reflexes: Reflexes normal       Comments: Straight leg raise is negative bilaterally; motor strength is 5/5 to the proximal distal musculature including great toe dorsiflexion and plantar and dorsiflexion light touch is intact throughout the lower extremities     Psychiatric:         Mood and Affect: Mood and affect and mood normal          Vital Signs  ED Triage Vitals [12/05/22 1515]   Temperature Pulse Respirations Blood Pressure SpO2   (!) 97 3 °F (36 3 °C) 88 18 165/70 96 %      Temp Source Heart Rate Source Patient Position - Orthostatic VS BP Location FiO2 (%)   Temporal Monitor Sitting Right arm --      Pain Score       6           Vitals:    12/05/22 1515 12/05/22 1845 12/05/22 2000   BP: 165/70 143/65 154/68   Pulse: 88 84 82   Patient Position - Orthostatic VS: Sitting Sitting Sitting         Visual Acuity      ED Medications  Medications   iohexol (OMNIPAQUE) 350 MG/ML injection (SINGLE-DOSE) 100 mL (100 mL Intravenous Given 12/5/22 1826)   ketorolac (TORADOL) injection 15 mg (15 mg Intravenous Given 12/5/22 1855)       Diagnostic Studies  Results Reviewed     Procedure Component Value Units Date/Time    Fingerstick Glucose (POCT) [[id number]]  (Normal) Collected: 12/05/22 1857    Lab Status: Final result Updated: 12/05/22 1858     POC Glucose 66 mg/dl     Urine Microscopic [[id number]]  (Normal) Collected: 12/05/22 1710    Lab Status: Final result Specimen: Urine, Indwelling De Souza Catheter Updated: 12/05/22 1736     RBC, UA 2-4 /hpf      WBC, UA 0-1 /hpf      Epithelial Cells Occasional /hpf      Bacteria, UA None Seen /hpf     UA w Reflex to Microscopic w Reflex to Culture [[id number]]  (Abnormal) Collected: 12/05/22 1710    Lab Status: Final result Specimen: Urine, Indwelling De Souza Catheter Updated: 12/05/22 1727     Color, UA Yellow     Clarity, UA Clear     Specific Gravity, UA 1 010     pH, UA 6 0     Leukocytes, UA Trace     Nitrite, UA Negative     Protein, UA Negative mg/dl      Glucose, UA >=1000 (1%) mg/dl      Ketones, UA Negative mg/dl      Urobilinogen, UA 0 2 E U /dl      Bilirubin, UA Negative     Occult Blood, UA Moderate    FLU/RSV/COVID - if FLU/RSV clinically relevant [[id number]]  (Normal) Collected: 12/05/22 1610    Lab Status: Final result Specimen: Nares from Nose Updated: 12/05/22 1706     SARS-CoV-2 Negative     INFLUENZA A PCR Negative     INFLUENZA B PCR Negative     RSV PCR Negative    Narrative:      FOR PEDIATRIC PATIENTS - copy/paste COVID Guidelines URL to browser: [email or web address]  [email or web address]    SARS-CoV-2 assay is a Nucleic Acid Amplification assay intended for the  qualitative detection of nucleic acid from SARS-CoV-2 in nasopharyngeal  swabs  Results are for the presumptive identification of SARS-CoV-2 RNA  Positive results are indicative of infection with SARS-CoV-2, the virus  causing COVID-19, but do not rule out bacterial infection or co-infection  with other viruses  Laboratories within the [place] and its  territories are required to report all positive results to the appropriate  public health authorities  Negative results do not preclude SARS-CoV-2  infection and should not be used as the sole basis for treatment or other  patient management decisions  Negative results must be combined with  clinical observations, patient history, and epidemiological information  This test has not been FDA cleared or approved  This test has been authorized by FDA under an Emergency Use Authorization  (EUA)  This test is only authorized for the duration of time the  declaration that circumstances exist justifying the authorization of the  emergency use of an in vitro diagnostic tests for detection of SARS-CoV-2  virus and/or diagnosis of COVID-19 infection under section 564(b)(1) of  the Act, 21 U  S C  859WLX-2(H)(8), unless the authorization is terminated  or revoked sooner  The test has been validated but independent review by FDA  and CLIA is pending  Test performed using [employer] Gene[employer]pert: This RT-PCR assay targets N2,  a region unique to SARS-CoV-2  A conserved region in the E-gene was chosen  for pan-Sarbecovirus detection which includes SARS-CoV-2  According to CMS-2020-01-R, this platform meets the definition of high-MedLink technology      Lipase [[id number]]  (Abnormal) Collected: 12/05/22 1610    Lab Status: Final result Specimen: Blood from Arm, Left Updated: 12/05/22 1654     Lipase 70 u/L     Hepatic function panel [[id number]]  (Abnormal) Collected: 12/05/22 1610    Lab Status: Final result Specimen: Blood from Arm, Left Updated: 12/05/22 1654     Total Bilirubin 0 58 mg/dL      Bilirubin, Direct 0 16 mg/dL      Alkaline Phosphatase 98 U/L      AST 14 U/L      ALT 17 U/L      Total Protein 7 3 g/dL      Albumin 3 3 g/dL     TSH, 3rd generation with Free T4 reflex [[id number]]  (Normal) Collected: 12/05/22 1610    Lab Status: Final result Specimen: Blood from Arm, Left Updated: 12/05/22 1654     TSH 3RD GENERATON 0 908 uIU/mL     Narrative:      Patients undergoing fluorescein dye angiography may retain small amounts of fluorescein in the body for 48-72 hours post procedure  Samples containing fluorescein can produce falsely depressed TSH values  If the patient had this procedure,a specimen should be resubmitted post fluorescein clearance  NT-BNP PRO [[id number]]  (Abnormal) Collected: 12/05/22 1610    Lab Status: Final result Specimen: Blood from Arm, Left Updated: 12/05/22 1654     NT-proBNP 251 pg/mL     Magnesium [[id number]]  (Normal) Collected: 12/05/22 1610    Lab Status: Final result Specimen: Blood from Arm, Left Updated: 12/05/22 1654     Magnesium 2 4 mg/dL     High Sensitivity Troponin I Random [[id number]]  (Abnormal) Collected: 12/05/22 1610    Lab Status: Final result Specimen: Blood from Arm, Left Updated: 12/05/22 1652     HS TnI random 5 ng/L     D-Dimer [[id number]]  (Abnormal) Collected: 12/05/22 1610    Lab Status: Final result Specimen: Blood from Arm, Left Updated: 12/05/22 1650     D-Dimer, Quant 0 78 ug/ml FEU     Narrative: In the evaluation for possible pulmonary embolism, in the appropriate (Well's Score of 4 or less) patient, the age adjusted d-dimer cutoff for this patient can be calculated as:    Age x 0 01 (in ug/mL) for Age-adjusted D-dimer exclusion threshold for a patient over 50 years      Lactic acid, plasma [[id number]] (Normal) Collected: 12/05/22 1610    Lab Status: Final result Specimen: Blood from Arm, Left Updated: 12/05/22 1647     LACTIC ACID 1 1 mmol/L     Narrative:      Result may be elevated if tourniquet was used during collection      Basic metabolic panel [[id number]] Collected: 12/05/22 1610    Lab Status: Final result Specimen: Blood from Arm, Left Updated: 12/05/22 1644     Sodium 141 mmol/L      Potassium 4 1 mmol/L      Chloride 106 mmol/L      CO2 28 mmol/L      ANION GAP 7 mmol/L      BUN 12 mg/dL      Creatinine 0 77 mg/dL      Glucose 92 mg/dL      Calcium 8 7 mg/dL      eGFR 85 ml/min/1 73sq m     Narrative:      [place] guidelines for Chronic Kidney Disease (CKD):   •  Stage 1 with normal or high GFR (GFR > 90 mL/min/1 73 square meters)  •  Stage 2 Mild CKD (GFR = 60-89 mL/min/1 73 square meters)  •  Stage 3A Moderate CKD (GFR = 45-59 mL/min/1 73 square meters)  •  Stage 3B Moderate CKD (GFR = 30-44 mL/min/1 73 square meters)  •  Stage 4 Severe CKD (GFR = 15-29 mL/min/1 73 square meters)  •  Stage 5 End Stage CKD (GFR <15 mL/min/1 73 square meters)  Note: GFR calculation is accurate only with a steady state creatinine    CK (with reflex to MB) [[id number]]  (Normal) Collected: 12/05/22 1610    Lab Status: Final result Specimen: Blood from Arm, Left Updated: 12/05/22 1644     Total CK 64 U/L     Protime-INR [[id number]]  (Normal) Collected: 12/05/22 1610    Lab Status: Final result Specimen: Blood from Arm, Left Updated: 12/05/22 1638     Protime 13 3 seconds      INR 0 99    APTT [[id number]]  (Normal) Collected: 12/05/22 1610    Lab Status: Final result Specimen: Blood from Arm, Left Updated: 12/05/22 1638     PTT 28 seconds     CBC and differential [[id number]]  (Abnormal) Collected: 12/05/22 1610    Lab Status: Final result Specimen: Blood from Arm, Left Updated: 12/05/22 1625     WBC 8 94 Thousand/uL      RBC 3 13 Million/uL      Hemoglobin 12 9 g/dL      Hematocrit 38 1 %      MCV 122 fL      MCH 41 2 pg      MCHC 33 9 g/dL      RDW 13 0 %      MPV 9 3 fL      Platelets 157 Thousands/uL      nRBC 0 /100 WBCs      Neutrophils Relative 61 %      Immat GRANS % 0 %      Lymphocytes Relative 30 %      Monocytes Relative 5 %      Eosinophils Relative 3 %      Basophils Relative 1 %      Neutrophils Absolute 5 48 Thousands/µL      Immature Grans Absolute 0 03 Thousand/uL      Lymphocytes Absolute 2 72 Thousands/µL      Monocytes Absolute 0 43 Thousand/µL      Eosinophils Absolute 0 23 Thousand/µL      Basophils Absolute 0 05 Thousands/µL     Lyme Antibody Profile with reflex to [facility] [[id number]] Collected: 12/05/22 1610    Lab Status: In process Specimen: Blood from Arm, Left Updated: 12/05/22 1621                 PE Study with CT Abdomen and Pelvis with contrast   Final Result by Georgina Moctezuma MD (12/05 2013)         1  No evidence of acute pulmonary embolus, thoracic aortic aneurysm or dissection  No acute cardiopulmonary process  2   No acute intra-abdominal or pelvic process  Workstation performed: [id number]         VAS lower limb venous duplex study, complete bilateral   Final Result by Doretha Aguiar DO (12/05 1814)      XR hip/pelv 2-3 vws left   ED Interpretation by Courtney Patiño MD (12/05 2003)   Read by me; Radiologist to provide formal interpretation  No acute fracture      XR chest 1 view portable   Final Result by Blane Merino MD (12/05 1623)      No acute cardiopulmonary disease                    Workstation performed: [id number]                    Procedures  ECG 12 Lead Documentation Only    Date/Time: 12/5/2022 5:45 PM  Performed by: Courtney Patiño MD  Authorized by: Courtney Patiño MD     Indications / Diagnosis:  Leg swelling  ECG reviewed by me, the ED Provider: yes    Patient location:  ED  Previous ECG:     Previous ECG:  Compared to current    Comparison ECG info:  2/12/21 14:57    Similarity:  No change  Rate: ECG rate:  78    ECG rate assessment: normal    Rhythm:     Rhythm: sinus rhythm    QRS:     QRS axis:  Normal  Comments:      No acute ischemic changes             ED Course  ED Course as of 12/06/22 0106   Mon Dec 05, 2022   1636 Prelim Venous doppler u/s bilateral lower ext neg for DVT   1823 Patient was updated on need for CT scans prior to transfer to the radiology suite and lab work   2033 Since leg bag is off left lower extremity edema is markedly improved will will check gait tiger connect to Dr Mik Giraldo regarding foraminal stenosis and central canal stenosis see if this is changed from prior CT scans  Patient updated   2040 Per Dr Nadia Santana osseous findings are chronic                               SBIRT 20yo+    Flowsheet Row Most Recent Value   SBIRT (25 yo +)    In order to provide better care to our patients, we are screening all of our patients for alcohol and drug use  Would it be okay to ask you these screening questions? Yes Filed at: 12/05/2022 1830   Initial Alcohol Screen: US AUDIT-C     1  How often do you have a drink containing alcohol? 0 Filed at: 12/05/2022 1830   2  How many drinks containing alcohol do you have on a typical day you are drinking? 0 Filed at: 12/05/2022 1830   3a  Male UNDER 65: How often do you have five or more drinks on one occasion? 0 Filed at: 12/05/2022 1830   3b  FEMALE Any Age, or MALE 65+: How often do you have 4 or more drinks on one occassion? 0 Filed at: 12/05/2022 1830   Audit-C Score 0 Filed at: 12/05/2022 1830   MARINA: How many times in the past year have you    Used an illegal drug or used a prescription medication for non-medical reasons?  Never Filed at: 12/05/2022 1830          Wells' Criteria for PE    Flowsheet Row Most Recent Value   Wells' Criteria for PE    Clinical signs and symptoms of DVT 3 Filed at: 12/05/2022 1727   PE is primary diagnosis or equally likely 3 Filed at: 12/05/2022 1727   HR >100 --   Immobilization at least 3 days or Surgery in the previous 4 weeks 1 5 Filed at: 12/05/2022 1727   Previous, objectively diagnosed PE or DVT 0 Filed at: 12/05/2022 1727   Hemoptysis 0 Filed at: 12/05/2022 1727   Malignancy with treatment within 6 months or palliative 0 Filed at: 12/05/2022 1727   Wells' Criteria Total 7 5 Filed at: 12/05/2022 1727                [facility]  Number of Diagnoses or Management Options  Leg swelling  Diagnosis management comments: Mdm:  So feet are equally warm  This does not appear to be acute aortic occlusion  DVT is a possibility will order venous Doppler ultrasounds and pursue D-dimer may be CHF  Will evaluate for infection electrolytes check thyroid not consistent with a lumbar radiculopathy and re-evaluate    Patient had a normal gait without pain with ambulation      Disposition  Final diagnoses:   Leg swelling     Time reflects when diagnosis was documented in both MDM as applicable and the Disposition within this note     Time User Action Codes Description Comment    12/5/2022  8:41 PM Colette Gutierrez [[id number]] Leg swelling       ED Disposition     ED Disposition   Discharge    Condition   Stable    Date/Time   Mon Dec 5, 2022  8:41 PM    Comment   Farzana Smith discharge to home/self care                 Follow-up Information     Follow up With Specialties Details Why Contact Info Additional [address] [facility] [place] [facility] Go to  as previously scheduled [address] [phone]  [phone]  [facility] [address], [place], [address] [facility] [place] [place]    [facility] Call in 1 day recheck of leg pain [address] [facility]  [phone] [facility] 1943, [address] [facility], [address], [place], [address]    Lashonda Franklin, DO Family Medicine Go in 1 week any recurrent leg swelling [address] [place] [id number]  [phone]             Discharge Medication List as of 12/5/2022  8:45 PM      CONTINUE these medications which have NOT CHANGED    Details   aspirin (ECOTRIN LOW STRENGTH) 81 mg EC tablet Take 81 mg by mouth daily, Historical Med      Dulaglutide (Trulicity) 2 42 OV/2 9BA SOPN Inject 0 5 mL (0 75 mg total) under the skin once a week, Starting Tue 11/8/2022, Normal      Empagliflozin (Jardiance) 10 MG TABS Take 1 tablet (10 mg total) by mouth every morning, Starting Mon 2/21/2022, Normal      famotidine (PEPCID) 40 MG tablet Take 1 tablet (40 mg total) by mouth daily, Starting Tue 6/7/2022, Normal      fluticasone (FLONASE) 50 mcg/act nasal spray 2 sprays into each nostril daily, Starting Thu 4/28/2022, Normal      HYDROcodone-acetaminophen (NORCO) 5-325 mg per tablet Take 1 tablet by mouth every 4 (four) hours as needed for pain for up to 10 days Max Daily Amount: 6 tablets, Starting Mon 11/28/2022, Until Thu 12/8/2022 at 2359, Normal      hydroxyurea (HYDREA) 500 mg capsule TAKE 1 CAPSULE BY MOUTH ON ODD NUMBERED DAYS AND 2 CAPSULES BY MOUTH ON EVEN NUMBERED DAYS, Normal      losartan (COZAAR) 25 mg tablet Take 1 tablet (25 mg total) by mouth daily, Starting Thu 4/28/2022, Normal      nitrofurantoin (MACRODANTIN) 50 mg capsule Take 1 capsule (50 mg total) by mouth daily, Starting Tue 7/12/2022, Normal      ondansetron (ZOFRAN-ODT) 4 mg disintegrating tablet Take 1 tablet (4 mg total) by mouth every 6 (six) hours as needed for nausea or vomiting, Starting Fri 7/8/2022, Normal      oxybutynin (DITROPAN-XL) 10 MG 24 hr tablet Take 1 tablet (10 mg total) by mouth daily at bedtime, Starting Mon 12/5/2022, Normal      simvastatin (ZOCOR) 20 mg tablet Take 1 tablet (20 mg total) by mouth daily at bedtime, Starting Tue 11/22/2022, Normal      traMADol (Ultram) 50 mg tablet Take 1 tablet (50 mg total) by mouth every 6 (six) hours as needed for moderate pain, Starting Thu 6/30/2022, Normal No discharge procedures on file      PDMP Review     None          ED Provider  Electronically Signed by           Neelima Lopez MD  12/06/22 [id number]

## 2022-12-05 NOTE — TELEPHONE ENCOUNTER
STAT DUPLEX SCHEDULED -ONLY LOCATION AVAILABLE WAS [facility]  CALLED PT WITH APPT INFO  WAS VERY ADAMANT SHE HAS NO TRANSPORTATION AND HER DAUGHTER IS IN SCHOOL  SHE IS GOING TO THE ER WHEN HER DAUGHTER RETURNS  STUDY WAS CANCELLED

## 2022-12-06 LAB
ATRIAL RATE: 78 BPM
B BURGDOR IGG+IGM SER-ACNC: 0.2 AI
P AXIS: 60 DEGREES
PR INTERVAL: 156 MS
QRS AXIS: 49 DEGREES
QRSD INTERVAL: 82 MS
QT INTERVAL: 378 MS
QTC INTERVAL: 430 MS
T WAVE AXIS: 48 DEGREES
VENTRICULAR RATE: 78 BPM

## 2022-12-06 NOTE — DISCHARGE INSTRUCTIONS
Use large collection bag for loving to avoid compression on the leg  Elevate legs when not up and around  Return with worsening leg swelling difficulty breathing chest pain numbness tingling weakness loss of bowel or bladder control or any new or worsening symptoms

## 2022-12-19 ENCOUNTER — HOSPITAL ENCOUNTER (OUTPATIENT)
Dept: RADIOLOGY | Facility: HOSPITAL | Age: 57
Discharge: HOME | End: 2022-12-19
Attending: UROLOGY

## 2022-12-19 DIAGNOSIS — S37.20XS: ICD-10-CM

## 2022-12-19 DIAGNOSIS — N89.8 VAGINAL MASS: ICD-10-CM

## 2022-12-19 DIAGNOSIS — N36.9 URETHRAL DISORDER: ICD-10-CM

## 2022-12-19 RX ADMIN — IOTHALAMATE MEGLUMINE 125 ML: 172 INJECTION URETERAL at 14:36

## 2022-12-20 ENCOUNTER — NURSE TRIAGE (OUTPATIENT)
Dept: OTHER | Facility: OTHER | Age: 57
End: 2022-12-20

## 2022-12-20 NOTE — TELEPHONE ENCOUNTER
Reason for Disposition  • All other urine symptoms    Answer Assessment - Initial Assessment Questions  1  SYMPTOMS: "What symptoms are you concerned about?"      Stinging catheter pain   2  ONSET:  "When did the symptoms start?"      After surgery bag was too small and was "I was filling up with fluids"      "yesterday the catheter was inserted further In Interventional radiology due to issues with leaking  3  FEVER: "Is there a fever?" If Yes, ask: "What is the temperature, how was it measured, and when did it start?"      No   4  ABDOMINAL PAIN: "Is there any abdominal pain?" (e g , Scale 1-10; or mild, moderate, severe)      no  5  URINE COLOR: "What color is the urine?"  "Is there blood present in the urine?" (e g , clear, yellow, cloudy, tea-colored, blood streaks, bright red)      Yellow   6  ONSET: "When was the catheter inserted?"      Yesterday   7  OTHER SYMPTOMS: "Do you have any other symptoms?" (e g , back pain, bad urine odor)       No   8  PREGNANCY: "Is there any chance you are pregnant?" "When was your last menstrual period?"      No    Answer Assessment - Initial Assessment Questions  1  SYMPTOM: "What's the main symptom you're concerned about?" (e g , frequency, incontinence)      Stinging with catheter   2  ONSET: "When did the  Stinging  start?"      Yesterday   3  PAIN: "Is there any pain?" If Yes, ask: "How bad is it?" (Scale: 1-10; mild, moderate, severe)      5/10  4  CAUSE: "What do you think is causing the symptoms?"      Deeper Insertion of catheter   5  OTHER SYMPTOMS: "Do you have any other symptoms?" (e g , fever, flank pain, blood in urine, pain with urination)      No   6   PREGNANCY: "Is there any chance you are pregnant?" "When was your last menstrual period?"      No    Protocols used: URINARY SYMPTOMS-ADULT-, URINARY CATHETER SYMPTOMS AND QUESTIONS-ADULT-

## 2022-12-20 NOTE — TELEPHONE ENCOUNTER
Telephone call to pt and let her know what Fabricio Malone stated  She verbalized understanding  Informed her that Dr Jade Yarbrough nurse who usually does the scheduling for this is out of office and we'd call her back tomorrow  Pt verbalized understanding

## 2022-12-20 NOTE — TELEPHONE ENCOUNTER
Patient is complaining of a stinging pain and she states she "cannot get comfortable at all today" she states this started yesterday after her procedure in IR  She would like someone to call her back in regards to this  She doesn't have an appt  Until the end of the month

## 2022-12-20 NOTE — TELEPHONE ENCOUNTER
Regarding: catheter issues  ----- Message from [facility] sent at 12/20/2022 12:52 PM EST -----  "I am experiencing catheter pain  Up in my bladder is stings  "

## 2022-12-20 NOTE — TELEPHONE ENCOUNTER
Discussed with Dr Amisha Ann, patient can have loving catheter removed   Please arrange void trial at [facility]

## 2022-12-20 NOTE — TELEPHONE ENCOUNTER
Called patient states having some discomfort and burning since procedure yesterday  Reports balloon was pushed too far up and now uncomfortable  Reports not drinking a lot  Patient reports a lot of discomfort, scheduled for follow up on 12/28  Pt wants loving removed sooner  Did advise pt some discomfort is expected

## 2022-12-21 NOTE — TELEPHONE ENCOUNTER
Called and spoke with [employer] to schedule void trial   Patient has had a catheter in the past and self removed it without any difficulty after cutting catheter and draining balloon  Patient wishes to remove catheter tomorrow morning around 0800 and present to the office around 2:30 pm for PVR to ensure adequate bladder emptying

## 2022-12-22 ENCOUNTER — PROCEDURE VISIT (OUTPATIENT)
Dept: UROLOGY | Facility: CLINIC | Age: 57
End: 2022-12-22

## 2022-12-22 VITALS
HEART RATE: 78 BPM | WEIGHT: 156.4 LBS | DIASTOLIC BLOOD PRESSURE: 78 MMHG | BODY MASS INDEX: 27.71 KG/M2 | HEIGHT: 63 IN | SYSTOLIC BLOOD PRESSURE: 128 MMHG

## 2022-12-22 DIAGNOSIS — N81.11 MIDLINE CYSTOCELE: Primary | ICD-10-CM

## 2022-12-22 DIAGNOSIS — N89.8 VAGINAL MASS: ICD-10-CM

## 2022-12-22 LAB — POST-VOID RESIDUAL VOLUME, ML POC: 60 ML

## 2022-12-22 NOTE — PROGRESS NOTES
12/22/2022  Ramon Godinez is a 62 y o  female  [id number]    Diagnosis: Vaginal mass   Chief Complaint    PVR         Patient presents for follow up post void residual s/p cystoscopy/excision of vaginal/periurethral mass managed by Dr Zion Spivey  Patient had cystogram which was reviewed by MD prior to catheter removal  Patient self removed catheter at home this morning around 0830 and present for PVR    Plan:    Follow up with Dr Zion Spivey as scheduled       Assessment:      Vitals:    12/22/22 1434   BP: 128/78   Pulse: 78   Weight: 70 9 kg (156 lb 6 4 oz)   Height: 5' 3" (1 6 m)         Patient voided 0 mL in the office  Post void residual measured via hand held bladder scan to be 60 mL  Patient reports voiding without difficulty since catheter removal  She does report leakage of urine/incontinence       Recent Results (from the past 6 hour(s))   POCT Measure PVR    Collection Time: 12/22/22  2:41 PM   Result Value Ref Range    POST-VOID RESIDUAL VOLUME, ML POC 60 mL         Dianna Wolf RN

## 2022-12-27 ENCOUNTER — TELEPHONE (OUTPATIENT)
Dept: FAMILY MEDICINE CLINIC | Facility: CLINIC | Age: 57
End: 2022-12-27

## 2022-12-27 DIAGNOSIS — J01.00 ACUTE NON-RECURRENT MAXILLARY SINUSITIS: Primary | ICD-10-CM

## 2022-12-27 RX ORDER — AMOXICILLIN AND CLAVULANATE POTASSIUM 875; 125 MG/1; MG/1
1 TABLET, FILM COATED ORAL EVERY 12 HOURS SCHEDULED
Qty: 14 TABLET | Refills: 0 | Status: SHIPPED | OUTPATIENT
Start: 2022-12-27 | End: 2023-01-03

## 2022-12-27 NOTE — PROGRESS NOTES
[address] [facility]  [facility]  [address] [facility]  [place], [address] [place] [address]  [phone]  www  [facility]  org      NAME: Adonis Ibarra  AGE: 62 y o  SEX: female  : 1965   MRN: [id number]    DATE: 2022  TIME: 5:28 PM     Virtual visit telephone-in the state where I am licensed, medical personnel only  in room    Assessment and Plan:    Status post excision of smooth muscle hamartoma, periurethral most likely arising from the smooth muscle of the bladder and the urethra, benign  No further treatment is needed for this  No evidence of vesicovaginal fistula  Plan is to allow her to heal and it may take some time  She will continue with Kegel exercises  Reassess in 1 year and if she is still having a lot of stress incontinence and urge incontinence we may consider bulking up with coapt tight and even perhaps Botox  Chief Complaint   No chief complaint on file  History of Present Illness   Status post cystoscopy and excision of periurethral mass transvaginally by me 2022  There was a trigonal bladder injury and this was closed primarily  Follow-up cystogram showed no extravasation  Pathology shows benign, haphazard proliferation of smooth muscle bundles in the mid to lower dermis most compatible with smooth muscle hamartoma  Lesion appears to extend to peripheral margins  Overlying squamous mucosa was unremarkable  No malignancy  She is having difficulty with "holding her urine at night" and need to double void  She feels a "bubble " at the meatus and has to double void  Has MADDISON  No constant incontinence/leakage      The following portions of the patient's history were reviewed and updated as appropriate: allergies, current medications, past family history, past medical history, past social history, past surgical history and problem list   Past Medical History:   Diagnosis Date   • Arthritis    • Cancer ([facility] [address] ) chronic leukemia   • Chronic kidney disease     cyst in left kidney  • Diabetes mellitus ([facility] [address] )    • Frequent sinus infections     Pt states she has a mass in left side of sinus   • GERD (gastroesophageal reflux disease)    • History of echocardiogram 11/19/2008    Normal    • History of nuclear stress test 11/19/2008    EF 66%, No evidence for ischemia  • Hyperlipidemia    • Hypertension    • Missing tooth, acquired     Pt reports losing a tooth d/t chemotherapy   • Personal history of COVID-19 01/2022    mild symptoms, recovered at home   • PONV (postoperative nausea and vomiting)    • Tumor     Urethral   • Wears glasses      Past Surgical History:   Procedure Laterality Date   • APPENDECTOMY     • BLADDER AUGMENTATION     • CHOLECYSTECTOMY     • COLONOSCOPY     • CYSTOSCOPY  12/28/2020   • CYSTOSCOPY N/A 11/28/2022    Procedure: CYSTOSCOPY, excision of vaginal/periurethral mass transvaginally;  Surgeon: Noah Hagen MD;  Location: [facility] MAIN [facility];  Service: Urology   • FL CYSTOGRAM  12/19/2022   • HERNIA REPAIR Right     inguinal   • HYSTERECTOMY     • IR AORTAGRAM WITH RUN-OFF  06/14/2019   • KNEE ARTHROSCOPY Bilateral    • NE COLONOSCOPY FLX DX W/COLLJ SPEC WHEN PFRMD N/A 01/23/2018    Procedure: COLONOSCOPY;  Surgeon: Luis Carlos Young DO;  Location: [facility];  Service: Gastroenterology   • NE Javon Lung 3RD+ ORD [address] PEL/LXTR [facility] Left 06/14/2019    Procedure: ARTERIOGRAM-arteriography and possible endovascular intervention ;  Surgeon: Carlito Jean MD;  Location:  MAIN OR;  Service: Vascular   • PUBOVAGINAL SLING N/A 03/17/2021    Procedure: Open transvaginal urethral biopsy;  Surgeon: Noah Hagen MD;  Location:  MAIN OR;  Service: Urology   • REDUCTION MAMMAPLASTY     • SHOULDER SURGERY Left    • WISDOM TOOTH EXTRACTION     • WISDOM TOOTH EXTRACTION       shoulder  Review of Systems   Review of Systems   Constitutional: Negative for fever  Respiratory: Negative for shortness of breath  Cardiovascular: Negative for chest pain  Genitourinary:        As per hpi       Active Problem List     Patient Active Problem List   Diagnosis   • Polycythemia vera ([facility] [address] )   • Type 2 diabetes mellitus with hyperglycemia, without long-term current use of insulin (HCC)   • Acute cystitis without hematuria   • Elevated MCV   • Hypercholesterolemia   • Female bladder prolapse   • Tobacco use   • GI bleed   • Hepatic steatosis   • Essential hypertension   • Aortoiliac occlusive disease (HCC)   • Chest pain with moderate risk for cardiac etiology   • Urethral disorder   • Preop cardiovascular exam       Objective   There were no vitals taken for this visit      Physical Exam        Current Medications     Current Outpatient Medications:   •  amoxicillin-clavulanate (AUGMENTIN) 875-125 mg per tablet, Take 1 tablet by mouth every 12 (twelve) hours for 7 days, Disp: 14 tablet, Rfl: 0  •  aspirin (ECOTRIN LOW STRENGTH) 81 mg EC tablet, Take 81 mg by mouth daily, Disp: , Rfl:   •  Dulaglutide (Trulicity) 6 07 ST/3 0HH SOPN, Inject 0 5 mL (0 75 mg total) under the skin once a week, Disp: 2 mL, Rfl: 1  •  Empagliflozin (Jardiance) 10 MG TABS, Take 1 tablet (10 mg total) by mouth every morning, Disp: 90 tablet, Rfl: 3  •  famotidine (PEPCID) 40 MG tablet, Take 1 tablet (40 mg total) by mouth daily, Disp: 90 tablet, Rfl: 3  •  fluticasone (FLONASE) 50 mcg/act nasal spray, 2 sprays into each nostril daily, Disp: 16 g, Rfl: 1  •  hydroxyurea (HYDREA) 500 mg capsule, TAKE 1 CAPSULE BY MOUTH ON ODD NUMBERED DAYS AND 2 CAPSULES BY MOUTH ON EVEN NUMBERED DAYS, Disp: 45 capsule, Rfl: 11  •  losartan (COZAAR) 25 mg tablet, Take 1 tablet (25 mg total) by mouth daily, Disp: 90 tablet, Rfl: 3  •  nitrofurantoin (MACRODANTIN) 50 mg capsule, Take 1 capsule (50 mg total) by mouth daily, Disp: 30 capsule, Rfl: 11  •  ondansetron (ZOFRAN-ODT) 4 mg disintegrating tablet, Take 1 tablet (4 mg total) by mouth every 6 (six) hours as needed for nausea or vomiting, Disp: 20 tablet, Rfl: 0  •  oxybutynin (DITROPAN-XL) 10 MG 24 hr tablet, Take 1 tablet (10 mg total) by mouth daily at bedtime, Disp: 30 tablet, Rfl: 12  •  simvastatin (ZOCOR) 20 mg tablet, Take 1 tablet (20 mg total) by mouth daily at bedtime, Disp: 90 tablet, Rfl: 3  •  traMADol (Ultram) 50 mg tablet, Take 1 tablet (50 mg total) by mouth every 6 (six) hours as needed for moderate pain, Disp: 30 tablet, Rfl: 0        Fany Vasquez MD          Virtual Brief Visit    Patient is located in the following state in which I hold an active license [place]      Assessment/Plan:    Problem List Items Addressed This Visit    None      Recent Visits  No visits were found meeting these conditions  Showing recent visits within past 7 days and meeting all other requirements  Today's Visits  Date Type Provider Dept   12/28/22 Telemedicine Fany Vasquez MD [facility] [address] today's visits and meeting all other requirements  Future Appointments  No visits were found meeting these conditions    Showing future appointments within next 150 days and meeting all other requirements         Visit Time    Visit Start Time: 2:10  Visit Stop Time: 2:32  Total Visit Duration: 22 minutes

## 2022-12-28 ENCOUNTER — TELEMEDICINE (OUTPATIENT)
Dept: UROLOGY | Facility: CLINIC | Age: 57
End: 2022-12-28

## 2022-12-28 DIAGNOSIS — N39.41 URGE INCONTINENCE: ICD-10-CM

## 2022-12-28 DIAGNOSIS — N89.8 VAGINAL MASS: Primary | ICD-10-CM

## 2022-12-28 NOTE — TELEPHONE ENCOUNTER
Telephone call to pt  Reviewed provider's message with pt  They verbalized understanding       Switched apt over to virtual and verified telephone number [phone]

## 2022-12-28 NOTE — TELEPHONE ENCOUNTER
Pt called and stated she is sick and has cold sores on her lips and asking if her appt for today at  2 pm with Dr Eva Bustos could possibly switch to VV     Pt call [phone]

## 2023-01-03 DIAGNOSIS — R11.0 NAUSEA: ICD-10-CM

## 2023-01-03 DIAGNOSIS — E11.65 TYPE 2 DIABETES MELLITUS WITH HYPERGLYCEMIA, WITHOUT LONG-TERM CURRENT USE OF INSULIN (HCC): ICD-10-CM

## 2023-01-03 RX ORDER — DULAGLUTIDE 0.75 MG/.5ML
0.75 INJECTION, SOLUTION SUBCUTANEOUS WEEKLY
Qty: 2 ML | Refills: 1 | Status: SHIPPED | OUTPATIENT
Start: 2023-01-03

## 2023-01-03 RX ORDER — ONDANSETRON 4 MG/1
4 TABLET, ORALLY DISINTEGRATING ORAL EVERY 6 HOURS PRN
Qty: 20 TABLET | Refills: 0 | Status: SHIPPED | OUTPATIENT
Start: 2023-01-03

## 2023-01-17 ENCOUNTER — NURSE TRIAGE (OUTPATIENT)
Dept: OTHER | Facility: OTHER | Age: 58
End: 2023-01-17

## 2023-01-17 DIAGNOSIS — N30.00 ACUTE CYSTITIS WITHOUT HEMATURIA: Primary | ICD-10-CM

## 2023-01-17 NOTE — TELEPHONE ENCOUNTER
Returned call to patient per request in note  Patient reports burning with urination, frequency, and increased incontinence  She will leave a urine sample at the lab and is aware the office will contact with her results  Advised adequate hydration with water and can take OTC azo to assist with burning  Patient also states she has been drinking cranberry juice  She will monitor temperature and contact the office with worsening symptoms

## 2023-01-17 NOTE — TELEPHONE ENCOUNTER
Reason for Disposition  • Urinating more frequently than usual (i e , frequency)    Answer Assessment - Initial Assessment Questions  1  SYMPTOM: "What's the main symptom you're concerned about?" (e g , frequency, incontinence)      Frequency, incontinence, burning sensation   2  ONSET: "When did the urinary symptoms  start?"      3 days ago   3  PAIN: "Is there any pain?" If Yes, ask: "How bad is it?" (Scale: 1-10; mild, moderate, severe)       Burning sensation with and without urination  4  CAUSE: "What do you think is causing the symptoms?"      Possible UTI   5  OTHER SYMPTOMS: "Do you have any other symptoms?" (e g , fever, flank pain, blood in urine, pain with urination)       99 0 forehead   6   PREGNANCY: "Is there any chance you are pregnant?" "When was your last menstrual period?"      No    Protocols used: URINARY SYMPTOMS-ADULT-[place]

## 2023-01-17 NOTE — TELEPHONE ENCOUNTER
Rx u/a placed in [facility]  Patient confirmed that she would obtain u/a test today  Patient would like a call from the office today to follow up

## 2023-02-16 ENCOUNTER — VBI (OUTPATIENT)
Dept: ADMINISTRATIVE | Facility: OTHER | Age: 58
End: 2023-02-16

## 2023-02-17 DIAGNOSIS — E11.65 TYPE 2 DIABETES MELLITUS WITH HYPERGLYCEMIA, WITHOUT LONG-TERM CURRENT USE OF INSULIN (HCC): ICD-10-CM

## 2023-02-17 DIAGNOSIS — E11.9 TYPE 2 DIABETES MELLITUS WITHOUT COMPLICATION, WITHOUT LONG-TERM CURRENT USE OF INSULIN (HCC): ICD-10-CM

## 2023-02-17 RX ORDER — DULAGLUTIDE 0.75 MG/.5ML
0.75 INJECTION, SOLUTION SUBCUTANEOUS WEEKLY
Qty: 2 ML | Refills: 1 | Status: SHIPPED | OUTPATIENT
Start: 2023-02-17

## 2023-02-17 RX ORDER — LOSARTAN POTASSIUM 25 MG/1
25 TABLET ORAL DAILY
Qty: 90 TABLET | Refills: 3 | Status: SHIPPED | OUTPATIENT
Start: 2023-02-17

## 2023-05-03 ENCOUNTER — VBI (OUTPATIENT)
Dept: ADMINISTRATIVE | Facility: OTHER | Age: 58
End: 2023-05-03

## 2023-05-08 DIAGNOSIS — E11.65 TYPE 2 DIABETES MELLITUS WITH HYPERGLYCEMIA, WITHOUT LONG-TERM CURRENT USE OF INSULIN (HCC): ICD-10-CM

## 2023-05-08 RX ORDER — DULAGLUTIDE 0.75 MG/.5ML
0.75 INJECTION, SOLUTION SUBCUTANEOUS WEEKLY
Qty: 2 ML | Refills: 1 | Status: SHIPPED | OUTPATIENT
Start: 2023-05-08

## 2023-05-09 DIAGNOSIS — E11.9 TYPE 2 DIABETES MELLITUS WITHOUT COMPLICATION, WITHOUT LONG-TERM CURRENT USE OF INSULIN (HCC): ICD-10-CM

## 2023-05-09 RX ORDER — LOSARTAN POTASSIUM 25 MG/1
25 TABLET ORAL DAILY
Qty: 90 TABLET | Refills: 0 | Status: SHIPPED | OUTPATIENT
Start: 2023-05-09

## 2023-06-26 DIAGNOSIS — K29.00 ACUTE GASTRITIS WITHOUT HEMORRHAGE, UNSPECIFIED GASTRITIS TYPE: ICD-10-CM

## 2023-06-26 RX ORDER — FAMOTIDINE 40 MG/1
40 TABLET, FILM COATED ORAL DAILY
Qty: 90 TABLET | Refills: 3 | Status: SHIPPED | OUTPATIENT
Start: 2023-06-26

## 2023-07-05 DIAGNOSIS — E11.65 TYPE 2 DIABETES MELLITUS WITH HYPERGLYCEMIA, WITHOUT LONG-TERM CURRENT USE OF INSULIN (HCC): ICD-10-CM

## 2023-07-05 RX ORDER — DULAGLUTIDE 0.75 MG/.5ML
0.75 INJECTION, SOLUTION SUBCUTANEOUS WEEKLY
Qty: 2 ML | Refills: 0 | Status: SHIPPED | OUTPATIENT
Start: 2023-07-05 | End: 2023-07-06 | Stop reason: SDUPTHER

## 2023-07-05 NOTE — TELEPHONE ENCOUNTER
Told patient we can send in 30 day supply, needs appt before next refill, has not seen PCP since 2020, said she will call tomorrow with schedule

## 2023-07-06 ENCOUNTER — OFFICE VISIT (OUTPATIENT)
Dept: FAMILY MEDICINE CLINIC | Facility: CLINIC | Age: 58
End: 2023-07-06
Payer: COMMERCIAL

## 2023-07-06 VITALS
DIASTOLIC BLOOD PRESSURE: 90 MMHG | HEART RATE: 93 BPM | OXYGEN SATURATION: 98 % | SYSTOLIC BLOOD PRESSURE: 144 MMHG | BODY MASS INDEX: 26.58 KG/M2 | WEIGHT: 150 LBS | HEIGHT: 63 IN | TEMPERATURE: 97.6 F

## 2023-07-06 DIAGNOSIS — J30.1 SEASONAL ALLERGIC RHINITIS DUE TO POLLEN: ICD-10-CM

## 2023-07-06 DIAGNOSIS — Z72.0 TOBACCO USE: ICD-10-CM

## 2023-07-06 DIAGNOSIS — E11.65 TYPE 2 DIABETES MELLITUS WITH HYPERGLYCEMIA, WITHOUT LONG-TERM CURRENT USE OF INSULIN (HCC): ICD-10-CM

## 2023-07-06 DIAGNOSIS — N36.9 URETHRAL DISORDER: ICD-10-CM

## 2023-07-06 DIAGNOSIS — R30.0 DYSURIA: Primary | ICD-10-CM

## 2023-07-06 LAB
SL AMB  POCT GLUCOSE, UA: ABNORMAL
SL AMB LEUKOCYTE ESTERASE,UA: ABNORMAL
SL AMB POCT BILIRUBIN,UA: ABNORMAL
SL AMB POCT BLOOD,UA: ABNORMAL
SL AMB POCT CLARITY,UA: CLEAR
SL AMB POCT COLOR,UA: YELLOW
SL AMB POCT HEMOGLOBIN AIC: 6 (ref ?–6.5)
SL AMB POCT KETONES,UA: ABNORMAL
SL AMB POCT NITRITE,UA: ABNORMAL
SL AMB POCT PH,UA: 6
SL AMB POCT SPECIFIC GRAVITY,UA: 1.01
SL AMB POCT URINE PROTEIN: ABNORMAL
SL AMB POCT UROBILINOGEN: ABNORMAL

## 2023-07-06 PROCEDURE — 83036 HEMOGLOBIN GLYCOSYLATED A1C: CPT | Performed by: FAMILY MEDICINE

## 2023-07-06 PROCEDURE — 81002 URINALYSIS NONAUTO W/O SCOPE: CPT | Performed by: FAMILY MEDICINE

## 2023-07-06 PROCEDURE — 99214 OFFICE O/P EST MOD 30 MIN: CPT | Performed by: FAMILY MEDICINE

## 2023-07-06 RX ORDER — DULAGLUTIDE 0.75 MG/.5ML
0.75 INJECTION, SOLUTION SUBCUTANEOUS WEEKLY
Qty: 2 ML | Refills: 5 | Status: SHIPPED | OUTPATIENT
Start: 2023-07-06

## 2023-07-06 RX ORDER — FLUTICASONE PROPIONATE 50 MCG
2 SPRAY, SUSPENSION (ML) NASAL DAILY
Qty: 16 G | Refills: 2 | Status: SHIPPED | OUTPATIENT
Start: 2023-07-06

## 2023-07-06 NOTE — ADDENDUM NOTE
Addended by: Ligia Mayer on: 7/6/2023 02:05 PM     Modules accepted: Orders
The patient is a 40y Male complaining of ear pain.

## 2023-07-06 NOTE — PROGRESS NOTES
Name: Brian Thomas      : 1965      MRN: [id number]  Encounter Provider: Graciela Sunshine DO  Encounter Date: 2023   Encounter department: [address]   Urinalysis just showed glucose in her urine. Hemoglobin A1c was 6.0. I will refer her to urology for continued bladder spasm and dysuria. Other lab work ordered when she gets lab work for hematology. Follow-up here in 6 to 12 months or as needed. 1. Dysuria  -     Ambulatory Referral to Urology; Future    2. Type 2 diabetes mellitus with hyperglycemia, without long-term current use of insulin (HCC)  -     POCT hemoglobin A1c  -     TSH, 3rd generation with Free T4 reflex  -     Albumin / creatinine urine ratio  -     Lipid Panel with Direct LDL reflex    3. Urethral disorder  -     Ambulatory Referral to Urology; Future    4. Tobacco use           Subjective     Patient here today stating that she has been getting bladder spasms and dysuria. Patient did have surgery for a vaginal mass in November of last year. Since then she has been having trouble with urination and bladder spasms. No fever or chills. Patient's blood sugar this morning was 127. Diabetes  She has type 2 diabetes mellitus. No MedicAlert identification noted. The initial diagnosis of diabetes was made 2 years ago. Pertinent negatives for hypoglycemia include no confusion, dizziness, headaches, hunger, mood changes, nervousness/anxiousness, pallor, seizures, sleepiness, speech difficulty, sweats or tremors. Associated symptoms include polyuria. Pertinent negatives for diabetes include no blurred vision, no chest pain, no fatigue, no foot paresthesias, no foot ulcerations, no polydipsia, no polyphagia, no visual change, no weakness and no weight loss. Pertinent negatives for hypoglycemia complications include no blackouts, no hospitalization, no nocturnal hypoglycemia, no required assistance and no required glucagon injection.  Symptoms are stable. Pertinent negatives for diabetic complications include no CVA, heart disease, impotence, nephropathy, peripheral neuropathy, PVD or retinopathy. Risk factors for coronary artery disease include dyslipidemia, family history, hypertension, post-menopausal and stress. Current diabetic treatment includes insulin injections. She is compliant with treatment most of the time. She is currently taking insulin at bedtime. Insulin injections are given by patient. Rotation sites for injection include the abdominal wall. Her weight is stable. She is following a generally healthy diet. When asked about meal planning, she reported none. She has not had a previous visit with a dietitian. She participates in exercise intermittently. She monitors blood glucose at home 1-2 x per day. She monitors urine at home <1 x per month. Blood glucose monitoring compliance is fair. There is no change in her home blood glucose trend. Her breakfast blood glucose is taken between 9-10 am. Her breakfast blood glucose range is generally 110-130 mg/dl. Her lunch blood glucose is taken between 12-1 pm. Her lunch blood glucose range is generally 110-130 mg/dl. Her dinner blood glucose is taken between 5-6 pm. Her dinner blood glucose range is generally  mg/dl. Her highest blood glucose is 110-130 mg/dl. Her overall blood glucose range is  mg/dl. She does not see a podiatrist.Eye exam is current. Review of Systems   Constitutional: Negative for fatigue and weight loss. Eyes: Negative for blurred vision. Cardiovascular: Negative for chest pain. Endocrine: Positive for polyuria. Negative for polydipsia and polyphagia. Genitourinary: Positive for dysuria. Negative for impotence. Skin: Negative for pallor. Neurological: Negative for dizziness, tremors, seizures, speech difficulty, weakness and headaches. Psychiatric/Behavioral: Negative for confusion. The patient is not nervous/anxious.         Past Medical History: Diagnosis Date   • Arthritis    • Cancer ([address])      chronic leukemia   • Chronic kidney disease     cyst in left kidney. • Diabetes mellitus ([address])    • Frequent sinus infections     Pt states she has a mass in left side of sinus   • GERD (gastroesophageal reflux disease)    • History of echocardiogram 11/19/2008    Normal.   • History of nuclear stress test 11/19/2008    EF 66%, No evidence for ischemia.    • Hyperlipidemia    • Hypertension    • Missing tooth, acquired     Pt reports losing a tooth d/t chemotherapy   • Personal history of COVID-19 01/2022    mild symptoms, recovered at home   • PONV (postoperative nausea and vomiting)    • Tumor     Urethral   • Wears glasses      Past Surgical History:   Procedure Laterality Date   • APPENDECTOMY     • BLADDER AUGMENTATION     • CHOLECYSTECTOMY     • COLONOSCOPY     • CYSTOSCOPY  12/28/2020   • CYSTOSCOPY N/A 11/28/2022    Procedure: CYSTOSCOPY, excision of vaginal/periurethral mass transvaginally;  Surgeon: Andres hWitaker MD;  Location: [facility];  Service: Urology   • FL CYSTOGRAM  12/19/2022   • HERNIA REPAIR Right     inguinal   • HYSTERECTOMY     • IR AORTAGRAM WITH RUN-OFF  06/14/2019   • KNEE ARTHROSCOPY Bilateral    • VT COLONOSCOPY FLX DX W/COLLJ SPEC WHEN PFRMD N/A 01/23/2018    Procedure: COLONOSCOPY;  Surgeon: Arnav Prado DO;  Location: [facility];  Service: Gastroenterology   • VT [facility] 3RD+ ORD SLCTV ABDL PEL/TR [facility] Left 06/14/2019    Procedure: ARTERIOGRAM-arteriography and possible endovascular intervention.;  Surgeon: Maribell Ayala MD;  Location:  MAIN OR;  Service: Vascular   • PUBOVAGINAL SLING N/A 03/17/2021    Procedure: Open transvaginal urethral biopsy;  Surgeon: Andres Whitaker MD;  Location:  MAIN OR;  Service: Urology   • REDUCTION MAMMAPLASTY     • SHOULDER SURGERY Left    • WISDOM TOOTH EXTRACTION     • WISDOM TOOTH EXTRACTION       Family History   Problem Relation Age of Onset   • Cancer Mother    • Heart disease Father    • Diabetes Father      Social History     Socioeconomic History   • Marital status: /Civil Union     Spouse name: None   • Number of children: None   • Years of education: None   • Highest education level: None   Occupational History   • None   Tobacco Use   • Smoking status: Every Day     Packs/day: 0.25     Years: 35.00     Total pack years: 8.75     Types: Cigarettes   • Smokeless tobacco: Never   Vaping Use   • Vaping Use: Never used   Substance and Sexual Activity   • Alcohol use: Not Currently     Alcohol/week: 0.0 standard drinks of alcohol     Comment: N/A   • Drug use: No   • Sexual activity: Not Currently   Other Topics Concern   • None   Social History Narrative   • None     Social Determinants of Health     Financial Resource Strain: Not on file   Food Insecurity: Not on file   Transportation Needs: Not on file   Physical Activity: Not on file   Stress: Not on file   Social Connections: Not on file   Intimate Partner Violence: Not on file   Housing Stability: Not on file     Current Outpatient Medications on File Prior to Visit   Medication Sig   • aspirin (ECOTRIN LOW STRENGTH) 81 mg EC tablet Take 81 mg by mouth daily   • dulaglutide (Trulicity) 4.18 MF/5.3JX injection Inject 0.5 mL (0.75 mg total) under the skin once a week   • Empagliflozin (Jardiance) 10 MG TABS tablet Take 1 tablet (10 mg total) by mouth every morning   • famotidine (PEPCID) 40 MG tablet Take 1 tablet (40 mg total) by mouth daily   • fluticasone (FLONASE) 50 mcg/act nasal spray 2 sprays into each nostril daily   • hydroxyurea (HYDREA) 500 mg capsule TAKE 1 CAPSULE BY MOUTH ON ODD NUMBERED DAYS AND 2 CAPSULES BY MOUTH ON EVEN NUMBERED DAYS   • losartan (COZAAR) 25 mg tablet Take 1 tablet (25 mg total) by mouth daily   • ondansetron (ZOFRAN-ODT) 4 mg disintegrating tablet Take 1 tablet (4 mg total) by mouth every 6 (six) hours as needed for nausea or vomiting   • oxybutynin (DITROPAN-XL) 10 MG 24 hr tablet Take 1 tablet (10 mg total) by mouth daily at bedtime   • simvastatin (ZOCOR) 20 mg tablet Take 1 tablet (20 mg total) by mouth daily at bedtime   • nitrofurantoin (MACRODANTIN) 50 mg capsule Take 1 capsule (50 mg total) by mouth daily (Patient not taking: Reported on 12/28/2022)   • traMADol (Ultram) 50 mg tablet Take 1 tablet (50 mg total) by mouth every 6 (six) hours as needed for moderate pain (Patient not taking: Reported on 7/6/2023)     Allergies   Allergen Reactions   • Chantix [Varenicline] Other (See Comments)     Psychiatric side effects   • Clomiphene Hives   • Metformin      Lactic acidosis   • Wellbutrin [Bupropion] Other (See Comments)     Psychiatric side effects   • Latex Blisters     There is no immunization history for the selected administration types on file for this patient. Objective     /90 (BP Location: Left arm, Patient Position: Sitting, Cuff Size: Large)   Pulse 93   Temp 97.6 °F (36.4 °C)   Ht 5' 3" (1.6 m)   Wt 68 kg (150 lb)   SpO2 98%   BMI 26.57 kg/m²     Physical Exam  Vitals reviewed. Constitutional:       General: She is not in acute distress. Appearance: Normal appearance. She is well-developed. She is not ill-appearing, toxic-appearing or diaphoretic. HENT:      Head: Normocephalic and atraumatic. Eyes:      Conjunctiva/sclera: Conjunctivae normal.   Cardiovascular:      Rate and Rhythm: Normal rate and regular rhythm. Pulses: no weak pulses          Dorsalis pedis pulses are 2+ on the right side and 2+ on the left side. Posterior tibial pulses are 2+ on the right side and 2+ on the left side. Heart sounds: Normal heart sounds. No murmur heard. No friction rub. No gallop. Pulmonary:      Effort: Pulmonary effort is normal. No respiratory distress. Breath sounds: Normal breath sounds. No wheezing, rhonchi or rales. Musculoskeletal:      Right lower leg: No edema. Left lower leg: No edema.    Feet:      Right foot:      Skin integrity: No ulcer, skin breakdown, erythema, warmth, callus or dry skin. Left foot:      Skin integrity: No ulcer, skin breakdown, erythema, warmth, callus or dry skin. Neurological:      General: No focal deficit present. Mental Status: She is alert and oriented to person, place, and time. Psychiatric:         Mood and Affect: Mood normal.         Behavior: Behavior normal.         Thought Content: Thought content normal.         Judgment: Judgment normal.     Patient's shoes and socks removed. Right Foot/Ankle   Right Foot Inspection  Skin Exam: skin normal and skin intact. No dry skin, no warmth, no callus, no erythema, no maceration, no abnormal color, no pre-ulcer, no ulcer and no callus. Sensory   Monofilament testing: intact    Vascular  The right DP pulse is 2+. The right PT pulse is 2+. Left Foot/Ankle  Left Foot Inspection  Skin Exam: skin normal and skin intact. No dry skin, no warmth, no erythema, no maceration, normal color, no pre-ulcer, no ulcer and no callus. Sensory   Monofilament testing: intact    Vascular  The left DP pulse is 2+. The left PT pulse is 2+.      Assign Risk Category  No deformity present  No loss of protective sensation  No weak pulses  Risk: 0    Therese Lakes, DO

## 2023-07-07 ENCOUNTER — TELEPHONE (OUTPATIENT)
Dept: HEMATOLOGY ONCOLOGY | Facility: CLINIC | Age: 58
End: 2023-07-07

## 2023-07-07 ENCOUNTER — TELEPHONE (OUTPATIENT)
Dept: ADMINISTRATIVE | Facility: OTHER | Age: 58
End: 2023-07-07

## 2023-07-07 NOTE — TELEPHONE ENCOUNTER
----- Message from Mirlande Wolfe sent at 7/6/2023  3:24 PM EDT -----  Regarding: care gap request  07/06/23 3:24 PM    Hello, our patient attached above has had Diabetic Eye Exam completed/performed. Please assist in updating the patient chart by making an External outreach to [facility] [phone] Dr. Bryson Salcido (sp?) facility located in [facility]. The date of service is within the past year .     Thank you,  Mirlande Wolfe  [facility]

## 2023-07-07 NOTE — LETTER
Procedure Request Form: Hysterectomy      Date Requested: 23  Patient: Erich Clap  Patient : 1965   Referring Provider: Carolyn Avila, DO        Date of Procedure ______________________________       The above patient has informed us that they have completed their   most recent Hysterectomy at your facility. Please complete   this form and attach all corresponding procedure reports/results. Comments __________________________________________________________  ____________________________________________________________________  ____________________________________________________________________  ____________________________________________________________________    Facility Completing Procedure _________________________________________    Form Completed By (print name) _______________________________________      Signature __________________________________________________________      These reports are needed for  compliance. Please fax this completed form and a copy of the procedure report to our office located at [address] [place] [address] as soon as possible to Fax [phone] attention Naa Gastelum: Phone [phone]    We thank you for your assistance in treating our mutual patient.

## 2023-07-07 NOTE — LETTER
Diabetic Eye Exam Form    Date Requested: 23  Patient: Gissell Clarke  Patient : 1965   Referring Provider: Celestina Rivera DO      DIABETIC Eye Exam Date _______________________________      Type of Exam MUST be documented for Diabetic Eye Exams. Please CHECK ONE. Retinal Exam       Dilated Retinal Exam       OCT       Optomap-Iris Exam      Fundus Photography       Left Eye - Please check Retinopathy or No Retinopathy        Exam did show retinopathy    Exam did not show retinopathy       Right Eye - Please check Retinopathy or No Retinopathy       Exam did show retinopathy    Exam did not show retinopathy       Comments __________________________________________________________    Practice Providing Exam ______________________________________________    Exam Performed By (print name) _______________________________________      Provider Signature ___________________________________________________      These reports are needed for  compliance. Please fax this completed form and a copy of the Diabetic Eye Exam report to our office located at [address] [place] [address] as soon as possible via Fax [phone] steve Hernandez: Phone [phone]  We thank you for your assistance in treating our mutual patient.

## 2023-07-07 NOTE — LETTER
Procedure Request Form: Hysterectomy      Date Requested: 23  Patient: [place]  Patient : 1965   Referring Provider: Dominique Poet, DO        Date of Procedure ______________________________       The above patient has informed us that they have completed their   most recent Hysterectomy at your facility. Please complete   this form and attach all corresponding procedure reports/results. Comments __________________________________________________________  ____________________________________________________________________  ____________________________________________________________________  ____________________________________________________________________    Facility Completing Procedure _________________________________________    Form Completed By (print name) _______________________________________      Signature __________________________________________________________      These reports are needed for  compliance. Please fax this completed form and a copy of the procedure report to our office located at [address] [place] [address] as soon as possible to Fax [phone] attention [place]: Phone [phone]    We thank you for your assistance in treating our mutual patient.

## 2023-07-07 NOTE — TELEPHONE ENCOUNTER
Upon review of the In Basket request and the patient's chart, initial outreach has been made via fax to facility. Please see Contacts section for details.      Thank you  Raghu Renner

## 2023-07-07 NOTE — LETTER
Diabetic Eye Exam Form  Second Request    Date Requested: 23  Patient: Aliya Manriquez  Patient : 1965   Referring Provider: Red Mansfield DO      DIABETIC Eye Exam Date _______________________________      Type of Exam MUST be documented for Diabetic Eye Exams. Please CHECK ONE. Retinal Exam       Dilated Retinal Exam       OCT       Optomap-Iris Exam      Fundus Photography       Left Eye - Please check Retinopathy or No Retinopathy        Exam did show retinopathy    Exam did not show retinopathy       Right Eye - Please check Retinopathy or No Retinopathy       Exam did show retinopathy    Exam did not show retinopathy       Comments __________________________________________________________    Practice Providing Exam ______________________________________________    Exam Performed By (print name) _______________________________________      Provider Signature ___________________________________________________      These reports are needed for  compliance. Please fax this completed form and a copy of the Diabetic Eye Exam report to our office located at [address] [place] [address] as soon as possible via Fax [phone] attention Thomas You: Phone [phone]  We thank you for your assistance in treating our mutual patient.

## 2023-07-07 NOTE — TELEPHONE ENCOUNTER
----- Message from Levar Prado sent at 7/6/2023  1:12 PM EDT -----  Regarding: pap  07/06/23 1:12 PM    Hello, our patient Starla Ybarra has had hysterectomy completed/performed. Please assist in updating the patient chart by Dr Janell Glaser date of service is 2005.     Thank you,  Levar Prado [facility]

## 2023-07-10 ENCOUNTER — TELEPHONE (OUTPATIENT)
Dept: HEMATOLOGY ONCOLOGY | Facility: CLINIC | Age: 58
End: 2023-07-10

## 2023-07-10 NOTE — TELEPHONE ENCOUNTER
Spoke with pt.rescheduled appointment form 7/11/23 to August  with Doris,stated she needed labss and was still recovering from a surgery.

## 2023-07-19 NOTE — TELEPHONE ENCOUNTER
As a follow-up, a second attempt has been made for outreach via fax to facility. Please see Contacts section for details.     Thank you  Isabell Moon

## 2023-07-21 NOTE — TELEPHONE ENCOUNTER
Upon review of the In Basket request we were able to locate, review, and update the patient chart as requested for Diabetic Eye Exam.    Any additional questions or concerns should be emailed to the Practice Liaisons via the appropriate education email address, please do not reply via In Basket.     Thank you  Agustin Oliva

## 2023-07-21 NOTE — TELEPHONE ENCOUNTER
As a final attempt, a third outreach has been made via telephone call to facility. Please see Contacts section for details. This encounter will be closed and completed by end of day. Should we receive the requested information because of previous outreach attempts, the requested patient's chart will be updated appropriately.       Eye exam- will fax over request asap  Hysterectomy - left detailed message regarding request.     Thank you  Amy Erwin

## 2023-07-24 ENCOUNTER — OFFICE VISIT (OUTPATIENT)
Dept: UROLOGY | Facility: CLINIC | Age: 58
End: 2023-07-24
Payer: COMMERCIAL

## 2023-07-24 VITALS
BODY MASS INDEX: 26.05 KG/M2 | HEIGHT: 63 IN | SYSTOLIC BLOOD PRESSURE: 152 MMHG | WEIGHT: 147 LBS | DIASTOLIC BLOOD PRESSURE: 98 MMHG

## 2023-07-24 DIAGNOSIS — N39.41 URGE INCONTINENCE: ICD-10-CM

## 2023-07-24 DIAGNOSIS — R30.0 DYSURIA: Primary | ICD-10-CM

## 2023-07-24 DIAGNOSIS — N36.9 URETHRAL DISORDER: ICD-10-CM

## 2023-07-24 LAB
SL AMB  POCT GLUCOSE, UA: NORMAL
SL AMB LEUKOCYTE ESTERASE,UA: NORMAL
SL AMB POCT BILIRUBIN,UA: NORMAL
SL AMB POCT BLOOD,UA: NORMAL
SL AMB POCT CLARITY,UA: CLEAR
SL AMB POCT COLOR,UA: YELLOW
SL AMB POCT KETONES,UA: NORMAL
SL AMB POCT NITRITE,UA: NORMAL
SL AMB POCT PH,UA: 6
SL AMB POCT SPECIFIC GRAVITY,UA: 1.02
SL AMB POCT URINE PROTEIN: NORMAL
SL AMB POCT UROBILINOGEN: 0.2

## 2023-07-24 PROCEDURE — 87086 URINE CULTURE/COLONY COUNT: CPT

## 2023-07-24 PROCEDURE — 99213 OFFICE O/P EST LOW 20 MIN: CPT

## 2023-07-24 PROCEDURE — 81002 URINALYSIS NONAUTO W/O SCOPE: CPT

## 2023-07-24 PROCEDURE — 88112 CYTOPATH CELL ENHANCE TECH: CPT | Performed by: PATHOLOGY

## 2023-07-24 RX ORDER — OXYBUTYNIN CHLORIDE 10 MG/1
20 TABLET, EXTENDED RELEASE ORAL DAILY
Qty: 60 TABLET | Refills: 12 | Status: SHIPPED | OUTPATIENT
Start: 2023-07-24

## 2023-07-24 NOTE — PROGRESS NOTES
7/24/2023    No chief complaint on file. Assessment and Plan    62 y.o. female manage by    1. Dysuria  · Ongoing for several months and does report history of recurrent UTIs taking Macrodantin 50 mg daily. · She is status post excision of a periurethral mass transvaginally by Dr. Catherine Fountain on 11/20/2022. Pathology was benign. · Urine dip today trace leukocytes negative nitrates or blood. · We will send this for urine culture as well as cytology  · I will call her with these results. 2.  Urinary incontinence  · Positive stress, urge, as well as continuous leakage of urine. Felt she was doing better with Ditropan XL 10 mg in the morning as opposed to in the evening. We will try increasing this to 20 mg daily. · PVR today was 98 mL. · Referral to pelvic floor physical therapy  · She will follow-up in 6 to 8 weeks for reassessment and I did discuss consideration for Botox as previously recommended by Dr. Catherine Fountain. History of Present Illness  Alannah Hidalgo is a 62 y.o. female here for evaluation of dysuria, urgency, and incontinence. Established patient of Dr. Catherine Fountain who is s/p excision of a periurethral mass transvaginally by him on 11/28/2022. There was a trigonal bladder injury and this was closed primarily. Follow-up cystogram showed no extravasation. Pathology shows benign, haphazard proliferation of smooth muscle bundles in the mid to lower dermis most compatible with smooth muscle hamartoma. Lesion appears to extend to peripheral margins. Overlying squamous mucosa was unremarkable. No malignancy. There does not appear to be evidence of recurrence on external exam today. Over the past several months though she has been experiencing worsening stress urinary incontinence and unable to hold her urine clear in the use of multiple incontinence pads per day. Today she has 3 pads stacked to control her incontinence.   She was started on Ditropan XL 10 mg by Dr. Catherine Fountain several months ago but reported little improvement in her incontinence. She does do Kegel exercises at home. She also reports significant dysuria as well as urgency and suprapubic pressure which has also been ongoing for several months. Urine testing today shows trace leukocytes but negative nitrates or blood. She does have a history of recurrent UTIs and does take Macrodantin 50 mg daily for this. Review of Systems   Constitutional: Negative for chills and fever. HENT: Negative for ear pain and sore throat. Eyes: Negative for pain and visual disturbance. Respiratory: Negative for cough and shortness of breath. Cardiovascular: Negative for chest pain and palpitations. Gastrointestinal: Negative for abdominal pain, constipation, diarrhea, nausea and vomiting. Genitourinary: Positive for dysuria, frequency and urgency. Negative for difficulty urinating and hematuria. Urinary incontinence   Musculoskeletal: Negative for arthralgias and back pain. Skin: Negative for color change and rash. Neurological: Negative for seizures and syncope. All other systems reviewed and are negative. Vitals  Vitals:    07/24/23 1130   BP: 152/98   BP Location: Left arm   Patient Position: Sitting   Cuff Size: Adult   Weight: 66.7 kg (147 lb)   Height: 5' 3" (1.6 m)       Physical Exam  Vitals reviewed. Exam conducted with a chaperone present Esperanza Phillips). Constitutional:       General: She is not in acute distress. Appearance: Normal appearance. She is normal weight. She is not ill-appearing or toxic-appearing. HENT:      Head: Normocephalic and atraumatic. Nose: Nose normal.   Eyes:      General: No scleral icterus. Conjunctiva/sclera: Conjunctivae normal.   Cardiovascular:      Rate and Rhythm: Normal rate. Pulses: Normal pulses. Pulmonary:      Effort: Pulmonary effort is normal. No respiratory distress. Abdominal:      General: Abdomen is flat.       Palpations: Abdomen is soft. Tenderness: There is no abdominal tenderness. There is no right CVA tenderness or left CVA tenderness. Hernia: No hernia is present. Genitourinary:     Comments: Normal external urethra without evidence of recurrent mass. Musculoskeletal:         General: Normal range of motion. Cervical back: Normal range of motion. Skin:     General: Skin is warm and dry. Neurological:      General: No focal deficit present. Mental Status: She is alert and oriented to person, place, and time. Mental status is at baseline. Psychiatric:         Mood and Affect: Mood normal.         Behavior: Behavior normal.         Thought Content: Thought content normal.         Judgment: Judgment normal.         Past History  Past Medical History:   Diagnosis Date   • Arthritis    • Cancer ([address])      chronic leukemia   • Chronic kidney disease     cyst in left kidney. • Diabetes mellitus ([address])    • Frequent sinus infections     Pt states she has a mass in left side of sinus   • GERD (gastroesophageal reflux disease)    • History of echocardiogram 11/19/2008    Normal.   • History of nuclear stress test 11/19/2008    EF 66%, No evidence for ischemia.    • Hyperlipidemia    • Hypertension    • Missing tooth, acquired     Pt reports losing a tooth d/t chemotherapy   • Personal history of COVID-19 01/2022    mild symptoms, recovered at home   • PONV (postoperative nausea and vomiting)    • Tumor     Urethral   • Wears glasses      Social History     Socioeconomic History   • Marital status: /Civil Union     Spouse name: None   • Number of children: None   • Years of education: None   • Highest education level: None   Occupational History   • None   Tobacco Use   • Smoking status: Every Day     Packs/day: 0.25     Years: 35.00     Total pack years: 8.75     Types: Cigarettes   • Smokeless tobacco: Never   Vaping Use   • Vaping Use: Never used   Substance and Sexual Activity   • Alcohol use: Not Currently Alcohol/week: 0.0 standard drinks of alcohol     Comment: N/A   • Drug use: No   • Sexual activity: Not Currently   Other Topics Concern   • None   Social History Narrative   • None     Social Determinants of Health     Financial Resource Strain: Not on file   Food Insecurity: Not on file   Transportation Needs: Not on file   Physical Activity: Not on file   Stress: Not on file   Social Connections: Not on file   Intimate Partner Violence: Not on file   Housing Stability: Not on file     Social History     Tobacco Use   Smoking Status Every Day   • Packs/day: 0.25   • Years: 35.00   • Total pack years: 8.75   • Types: Cigarettes   Smokeless Tobacco Never     Family History   Problem Relation Age of Onset   • Cancer Mother    • Heart disease Father    • Diabetes Father        The following portions of the patient's history were reviewed and updated as appropriate allergies, current medications, past medical history, past social history, past surgical history and problem list    Imaging:    Results  No results found for this or any previous visit (from the past 1 hour(s)). ]  No results found for: "PSA"  Lab Results   Component Value Date    GLUCOSE 138 05/09/2014    CALCIUM 8.7 12/05/2022     05/09/2014    K 4.1 12/05/2022    CO2 28 12/05/2022     12/05/2022    BUN 12 12/05/2022    CREATININE 0.77 12/05/2022     Lab Results   Component Value Date    WBC 8.94 12/05/2022    HGB 12.9 12/05/2022    HCT 38.1 12/05/2022     (H) 12/05/2022     12/05/2022       Please Note:  Voice dictation software has been used to create this document. There may be inadvertent transcriptions errors.      JUANITA Barnett 07/24/23

## 2023-07-25 LAB — BACTERIA UR CULT: NORMAL

## 2023-07-26 ENCOUNTER — TELEPHONE (OUTPATIENT)
Dept: UROLOGY | Facility: CLINIC | Age: 58
End: 2023-07-26

## 2023-07-26 NOTE — TELEPHONE ENCOUNTER
Called patient and informed her that her urine culture came back negative for infection. I also informed the pt that her urine cytology is still pending and that we would call her with those results. Pt confirmed understanding.               ----- Message from [address] sent at 7/26/2023 12:07 PM EDT -----  Please let patient know that her urine culture is negative and I would not recommend antibiotics at this time. Urine cytology is still pending and we will contact her with these results.

## 2023-07-26 NOTE — RESULT ENCOUNTER NOTE
Please let patient know that her urine culture is negative and I would not recommend antibiotics at this time. Urine cytology is still pending and we will contact her with these results.

## 2023-07-27 ENCOUNTER — TELEPHONE (OUTPATIENT)
Dept: UROLOGY | Facility: CLINIC | Age: 58
End: 2023-07-27

## 2023-07-27 DIAGNOSIS — R30.0 DYSURIA: Primary | ICD-10-CM

## 2023-07-27 PROCEDURE — 88112 CYTOPATH CELL ENHANCE TECH: CPT | Performed by: PATHOLOGY

## 2023-07-27 NOTE — TELEPHONE ENCOUNTER
Telephone call to pt. Reviewed provider's message with pt. They verbalized understanding.      Pt was curious about the yeast in her urine please advise

## 2023-07-27 NOTE — TELEPHONE ENCOUNTER
----- Message from [id number] sent at 7/27/2023  9:49 AM EDT -----  Please let patient know that her urine cytology was negative.

## 2023-07-27 NOTE — TELEPHONE ENCOUNTER
Yeast is urine is often from contaminant specimen. Urine culture did not grow any large colony count of this. If she does still have dysuria we could consider a one-time dose of fluconazole to see if this resolves her dysuria.

## 2023-07-28 RX ORDER — FLUCONAZOLE 200 MG/1
200 TABLET ORAL
Qty: 2 TABLET | Refills: 0 | Status: SHIPPED | OUTPATIENT
Start: 2023-07-28 | End: 2023-09-28

## 2023-07-28 NOTE — TELEPHONE ENCOUNTER
Telephone call to pt. Reviewed provider's message with pt. They verbalized understanding. Pt is still experiencing dysuria. Please send RX to [facility] in [facility]. No call back to pt is necessary.  She stated she has "a bunch" of other RXes to

## 2023-08-04 DIAGNOSIS — D45 POLYCYTHEMIA VERA (HCC): ICD-10-CM

## 2023-08-04 DIAGNOSIS — E11.9 TYPE 2 DIABETES MELLITUS WITHOUT COMPLICATION, WITHOUT LONG-TERM CURRENT USE OF INSULIN (HCC): ICD-10-CM

## 2023-08-04 DIAGNOSIS — Z29.8 NEED FOR PROPHYLAXIS AGAINST URINARY TRACT INFECTION: Primary | ICD-10-CM

## 2023-08-04 RX ORDER — LOSARTAN POTASSIUM 25 MG/1
25 TABLET ORAL DAILY
Qty: 90 TABLET | Refills: 0 | Status: SHIPPED | OUTPATIENT
Start: 2023-08-04

## 2023-08-04 RX ORDER — HYDROXYUREA 500 MG/1
CAPSULE ORAL
Qty: 45 CAPSULE | Refills: 5 | Status: SHIPPED | OUTPATIENT
Start: 2023-08-04 | End: 2023-08-22 | Stop reason: SDUPTHER

## 2023-08-04 RX ORDER — NITROFURANTOIN MACROCRYSTALS 50 MG/1
50 CAPSULE ORAL DAILY
Qty: 30 CAPSULE | Refills: 2 | Status: SHIPPED | OUTPATIENT
Start: 2023-08-04

## 2023-08-22 ENCOUNTER — TELEMEDICINE (OUTPATIENT)
Dept: HEMATOLOGY ONCOLOGY | Facility: CLINIC | Age: 58
End: 2023-08-22

## 2023-08-22 DIAGNOSIS — D45 POLYCYTHEMIA VERA (HCC): Primary | ICD-10-CM

## 2023-08-22 DIAGNOSIS — R71.8 ELEVATED MCV: ICD-10-CM

## 2023-08-22 RX ORDER — HYDROXYUREA 500 MG/1
CAPSULE ORAL
Qty: 45 CAPSULE | Refills: 5 | Status: SHIPPED | OUTPATIENT
Start: 2023-08-22

## 2023-08-22 NOTE — PROGRESS NOTES
[facility] [address] [address]  [phone]  Hematology Ambulatory [address] [facility], 1965, [id number]  8/22/2023    Assessment/Plan:    1. Polycythemia vera (HCC)  2. Elevated MCV        - hydroxyurea (HYDREA) 500 mg capsule; TAKE 1 CAPSULE BY MOUTH ON ODD NUMBERED DAYS AND 2 CAPSULES BY MOUTH ON EVEN NUMBERED DAYS  Dispense: 45 capsule; Refill: 5  - CBC and differential; Standing  - Comprehensive metabolic panel; Standing  - CBC and differential  - Comprehensive metabolic panel    Barrier(s) to care: None  The patient is able to self care. [facility]    Interval history: clinically stable    Review of Systems   Constitutional: Positive for appetite change (decreased) and fatigue. Negative for activity change, fever and unexpected weight change. Respiratory: Negative for cough and shortness of breath. Cardiovascular: Negative for chest pain and leg swelling. Gastrointestinal: Negative for abdominal pain, constipation, diarrhea and nausea. Endocrine: Negative for cold intolerance and heat intolerance. Musculoskeletal: Negative for arthralgias and myalgias. Skin: Negative. Neurological: Negative for dizziness, weakness and headaches. Hematological: Negative for adenopathy. Bruises/bleeds easily. Past Medical History:   Diagnosis Date   • Arthritis    • Cancer ([address] [facility])      chronic leukemia   • Chronic kidney disease     cyst in left kidney. • Diabetes mellitus ([address])    • Frequent sinus infections     Pt states she has a mass in left side of sinus   • GERD (gastroesophageal reflux disease)    • History of echocardiogram 11/19/2008    Normal.   • History of nuclear stress test 11/19/2008    EF 66%, No evidence for ischemia.    • Hyperlipidemia    • Hypertension    • Missing tooth, acquired     Pt reports losing a tooth d/t chemotherapy   • Personal history of COVID-19 01/2022    mild symptoms, recovered at home   • PONV (postoperative nausea and vomiting)    • Tumor     Urethral   • Wears glasses      Past Surgical History:   Procedure Laterality Date   • APPENDECTOMY     • BLADDER AUGMENTATION     • CHOLECYSTECTOMY     • COLONOSCOPY     • CYSTOSCOPY  12/28/2020   • CYSTOSCOPY N/A 11/28/2022    Procedure: CYSTOSCOPY, excision of vaginal/periurethral mass transvaginally;  Surgeon: Thien Watson MD;  Location: [facility];  Service: Urology   • FL CYSTOGRAM  12/19/2022   • HERNIA REPAIR Right     inguinal   • HYSTERECTOMY     • IR AORTAGRAM WITH RUN-OFF  06/14/2019   • KNEE ARTHROSCOPY Bilateral    • IA COLONOSCOPY FLX DX W/COLLJ SPEC WHEN PFRMD N/A 01/23/2018    Procedure: COLONOSCOPY;  Surgeon: Etta Robb DO;  Location: [facility];  Service: Gastroenterology   • IA SLCTV CATHJ 3RD+ ORD SLCTV ABDL PEL/LXTR [facility] Left 06/14/2019    Procedure: ARTERIOGRAM-arteriography and possible endovascular intervention.;  Surgeon: Clarice Yin MD;  Location:  MAIN OR;  Service: Vascular   • PUBOVAGINAL SLING N/A 03/17/2021    Procedure: Open transvaginal urethral biopsy;  Surgeon: Thien Watson MD;  Location:  MAIN OR;  Service: Urology   • REDUCTION MAMMAPLASTY     • SHOULDER SURGERY Left    • WISDOM TOOTH EXTRACTION     • WISDOM TOOTH EXTRACTION         Current Outpatient Medications:   •  hydroxyurea (HYDREA) 500 mg capsule, TAKE 1 CAPSULE BY MOUTH ON ODD NUMBERED DAYS AND 2 CAPSULES BY MOUTH ON EVEN NUMBERED DAYS, Disp: 45 capsule, Rfl: 5  •  aspirin (ECOTRIN LOW STRENGTH) 81 mg EC tablet, Take 81 mg by mouth daily, Disp: , Rfl:   •  dulaglutide (Trulicity) 5.12 LC/5.1QC injection, Inject 0.5 mL (0.75 mg total) under the skin once a week, Disp: 2 mL, Rfl: 5  •  Empagliflozin (Jardiance) 10 MG TABS tablet, Take 1 tablet (10 mg total) by mouth every morning, Disp: 90 tablet, Rfl: 3  •  famotidine (PEPCID) 40 MG tablet, Take 1 tablet (40 mg total) by mouth daily, Disp: 90 tablet, Rfl: 3  •  fluticasone (FLONASE) 50 mcg/act nasal spray, 2 sprays into each nostril daily, Disp: 16 g, Rfl: 2  •  losartan (COZAAR) 25 mg tablet, Take 1 tablet (25 mg total) by mouth daily, Disp: 90 tablet, Rfl: 0  •  nitrofurantoin (MACRODANTIN) 50 mg capsule, Take 1 capsule (50 mg total) by mouth daily, Disp: 30 capsule, Rfl: 2  •  ondansetron (ZOFRAN-ODT) 4 mg disintegrating tablet, Take 1 tablet (4 mg total) by mouth every 6 (six) hours as needed for nausea or vomiting, Disp: 20 tablet, Rfl: 0  •  oxybutynin (DITROPAN-XL) 10 MG 24 hr tablet, Take 2 tablets (20 mg total) by mouth in the morning, Disp: 60 tablet, Rfl: 12  •  simvastatin (ZOCOR) 20 mg tablet, Take 1 tablet (20 mg total) by mouth daily at bedtime, Disp: 90 tablet, Rfl: 3    Allergies   Allergen Reactions   • Chantix [Varenicline] Other (See Comments)     Psychiatric side effects   • Clomiphene Hives      Brand name - clomid   • Metformin      Lactic acidosis   • Wellbutrin [Bupropion] Other (See Comments)     Psychiatric side effects   • Latex Blisters     Objective: There were no vitals taken for this visit. Physical Exam  Vitals reviewed. Constitutional:       Appearance: Normal appearance. She is well-developed. HENT:      Head: Normocephalic and atraumatic. Eyes:      Conjunctiva/sclera: Conjunctivae normal.      Pupils: Pupils are equal, round, and reactive to light. Pulmonary:      Effort: Pulmonary effort is normal. No respiratory distress. Musculoskeletal:         General: Normal range of motion. Cervical back: Normal range of motion. Lymphadenopathy:      Cervical: No cervical adenopathy. Skin:     General: Skin is dry. Neurological:      Mental Status: She is alert and oriented to person, place, and time. Psychiatric:         Behavior: Behavior normal.     Please note: This report has been generated by a voice recognition software system.  Therefore there may be syntax, spelling, and/or grammatical errors. Please call if you have any questions.

## 2023-08-22 NOTE — PROGRESS NOTES
Virtual Brief Visit    This Visit is being completed by telephone. The Patient is located at Home and in the following state in which I hold an active license [place]    The patient was identified by name and date of birth. Nasrin Doan was informed that this is a telemedicine visit and that the visit is being conducted through Telephone. My office door was closed. No one else was in the room. She acknowledged consent and understanding of privacy and security of the video platform. The patient has agreed to participate and understands they can discontinue the visit at any time. Patient is aware this is a billable service. Assessment/Plan:    Problem List Items Addressed This Visit        Other    Polycythemia vera ([address]) - Primary    Relevant Medications    hydroxyurea (HYDREA) 500 mg capsule    Other Relevant Orders    CBC and differential    Comprehensive metabolic panel    Elevated MCV    Relevant Orders    CBC and differential    Comprehensive metabolic panel    Patient is a 49-year-old female with a history of JAK2 negative polycythemia vera. She is currently on Hydrea 500 mg alternating with 1000 mg every other day. Overall tolerates without difficulty. She had a smooth muscle hamartoma removed from her bladder in November 2022. She is currently undergoing bladder retraining secondary to incontinence. I do not have updated labs. Most recent CBC is from December 2022 showing a normal WBC and platelet count. Hemoglobin 12.9, , normal differential.  We will obtain updated blood work now and every 6 months adjusting Hydrea as indicated. Patient does report decreased appetite and 12 pound weight loss in the past several months, etiology is unclear. We will see her in 1 year, patient verbalized understanding and is in agreement with plan. Recent Visits  No visits were found meeting these conditions.   Showing recent visits within past 7 days and meeting all other requirements  Today's Visits  Date Type Provider Dept   08/22/23 Telemedicine Sharda Setting, CRNP [facility] [place]   Showing today's visits and meeting all other requirements  Future Appointments  No visits were found meeting these conditions.   Showing future appointments within next 150 days and meeting all other requirements     Visit Time  Total Visit Duration: 20 minutes

## 2023-09-29 DIAGNOSIS — Z12.31 ENCOUNTER FOR SCREENING MAMMOGRAM FOR MALIGNANT NEOPLASM OF BREAST: Primary | ICD-10-CM

## 2023-10-27 DIAGNOSIS — Z29.89 NEED FOR PROPHYLAXIS AGAINST URINARY TRACT INFECTION: ICD-10-CM

## 2023-10-30 RX ORDER — NITROFURANTOIN MACROCRYSTALS 50 MG/1
50 CAPSULE ORAL DAILY
Qty: 30 CAPSULE | Refills: 0 | Status: SHIPPED | OUTPATIENT
Start: 2023-10-30

## 2023-11-08 DIAGNOSIS — E11.9 TYPE 2 DIABETES MELLITUS WITHOUT COMPLICATION, WITHOUT LONG-TERM CURRENT USE OF INSULIN (HCC): ICD-10-CM

## 2023-11-08 RX ORDER — LOSARTAN POTASSIUM 25 MG/1
25 TABLET ORAL DAILY
Qty: 90 TABLET | Refills: 0 | Status: SHIPPED | OUTPATIENT
Start: 2023-11-08

## 2023-11-27 DIAGNOSIS — E78.2 MIXED HYPERLIPIDEMIA: ICD-10-CM

## 2023-11-28 DIAGNOSIS — Z29.89 NEED FOR PROPHYLAXIS AGAINST URINARY TRACT INFECTION: Primary | ICD-10-CM

## 2023-11-28 RX ORDER — NITROFURANTOIN MACROCRYSTALS 50 MG/1
50 CAPSULE ORAL DAILY
Qty: 30 CAPSULE | Refills: 2 | Status: SHIPPED | OUTPATIENT
Start: 2023-11-28

## 2023-11-28 RX ORDER — SIMVASTATIN 20 MG
20 TABLET ORAL
Qty: 90 TABLET | Refills: 0 | Status: SHIPPED | OUTPATIENT
Start: 2023-11-28

## 2024-01-04 DIAGNOSIS — E11.65 TYPE 2 DIABETES MELLITUS WITH HYPERGLYCEMIA, WITHOUT LONG-TERM CURRENT USE OF INSULIN (HCC): ICD-10-CM

## 2024-01-04 RX ORDER — DULAGLUTIDE 0.75 MG/.5ML
0.75 INJECTION, SOLUTION SUBCUTANEOUS WEEKLY
Qty: 2 ML | Refills: 0 | Status: SHIPPED | OUTPATIENT
Start: 2024-01-04

## 2024-02-08 DIAGNOSIS — E11.65 TYPE 2 DIABETES MELLITUS WITH HYPERGLYCEMIA, WITHOUT LONG-TERM CURRENT USE OF INSULIN (HCC): ICD-10-CM

## 2024-02-09 RX ORDER — DULAGLUTIDE 0.75 MG/.5ML
0.75 INJECTION, SOLUTION SUBCUTANEOUS WEEKLY
Qty: 2 ML | Refills: 0 | Status: SHIPPED | OUTPATIENT
Start: 2024-02-09

## 2024-02-16 ENCOUNTER — OFFICE VISIT (OUTPATIENT)
Dept: FAMILY MEDICINE CLINIC | Facility: CLINIC | Age: 59
End: 2024-02-16
Payer: COMMERCIAL

## 2024-02-16 VITALS
OXYGEN SATURATION: 99 % | SYSTOLIC BLOOD PRESSURE: 160 MMHG | TEMPERATURE: 98.4 F | DIASTOLIC BLOOD PRESSURE: 90 MMHG | BODY MASS INDEX: 26.44 KG/M2 | HEART RATE: 95 BPM | WEIGHT: 149.2 LBS | HEIGHT: 63 IN

## 2024-02-16 DIAGNOSIS — E11.9 TYPE 2 DIABETES MELLITUS WITHOUT COMPLICATION, WITHOUT LONG-TERM CURRENT USE OF INSULIN (HCC): ICD-10-CM

## 2024-02-16 DIAGNOSIS — E11.65 TYPE 2 DIABETES MELLITUS WITH HYPERGLYCEMIA, WITHOUT LONG-TERM CURRENT USE OF INSULIN (HCC): ICD-10-CM

## 2024-02-16 DIAGNOSIS — I10 ESSENTIAL HYPERTENSION: ICD-10-CM

## 2024-02-16 DIAGNOSIS — J06.9 UPPER RESPIRATORY TRACT INFECTION, UNSPECIFIED TYPE: Primary | ICD-10-CM

## 2024-02-16 LAB — SL AMB POCT HEMOGLOBIN AIC: 6.2 (ref ?–6.5)

## 2024-02-16 PROCEDURE — 99214 OFFICE O/P EST MOD 30 MIN: CPT | Performed by: FAMILY MEDICINE

## 2024-02-16 PROCEDURE — 83036 HEMOGLOBIN GLYCOSYLATED A1C: CPT | Performed by: FAMILY MEDICINE

## 2024-02-16 RX ORDER — BENZONATATE 100 MG/1
100 CAPSULE ORAL 3 TIMES DAILY PRN
Qty: 20 CAPSULE | Refills: 0 | Status: SHIPPED | OUTPATIENT
Start: 2024-02-16

## 2024-02-16 RX ORDER — LOSARTAN POTASSIUM 25 MG/1
25 TABLET ORAL DAILY
Qty: 90 TABLET | Refills: 1 | Status: SHIPPED | OUTPATIENT
Start: 2024-02-16

## 2024-02-16 RX ORDER — AMOXICILLIN AND CLAVULANATE POTASSIUM 875; 125 MG/1; MG/1
1 TABLET, FILM COATED ORAL EVERY 12 HOURS SCHEDULED
Qty: 14 TABLET | Refills: 0 | Status: SHIPPED | OUTPATIENT
Start: 2024-02-16 | End: 2024-02-23

## 2024-02-16 NOTE — PROGRESS NOTES
Name: Mary Kay      : 1965      MRN: [id number]  Encounter Provider: Aakash Trejo DO  Encounter Date: 2024   Encounter department: [facility]    Assessment & Plan   Hemoglobin A1c better at 6.2.  Refilled her losartan.  Rx put in for Augmentin and Tessalon Perles.  She will continue her Flonase, get a room humidifier and take Robitussin over-the-counter.  I encouraged her to get her lab work done.  Follow-up with other specialist as scheduled.  Follow-up here in 6 months or as needed  1. Upper respiratory tract infection, unspecified type  -     amoxicillin-clavulanate (AUGMENTIN) 875-125 mg per tablet; Take 1 tablet by mouth every 12 (twelve) hours for 7 days  -     benzonatate (TESSALON PERLES) 100 mg capsule; Take 1 capsule (100 mg total) by mouth 3 (three) times a day as needed for cough    2. Type 2 diabetes mellitus with hyperglycemia, without long-term current use of insulin ([facility])  -     POCT hemoglobin A1c    3. Essential hypertension    4. Type 2 diabetes mellitus without complication, without long-term current use of insulin ([facility])  -     losartan (COZAAR) 25 mg tablet; Take 1 tablet (25 mg total) by mouth daily           Subjective     Patient here today for stating for the last couple weeks has had head congestion, postnasal drip, some chest congestion.  Mild cough.  No nausea or vomiting.  Has not taken anything over-the-counter for it.  She does use her Flonase.  Still has not had her lab work done.  She did not take her losartan yet today.      Review of Systems   Constitutional: Negative.  Negative for fever.   HENT:  Positive for congestion.    Respiratory:  Positive for cough (mild). Negative for shortness of breath.    Cardiovascular: Negative.    Gastrointestinal: Negative.    Genitourinary: Negative.        Past Medical History:   Diagnosis Date   • Arthritis    • Cancer (HCC)      chronic leukemia   • Chronic kidney disease     cyst in left kidney.   •  Diabetes mellitus (HCC)    • Frequent sinus infections     Pt states she has a mass in left side of sinus   • GERD (gastroesophageal reflux disease)    • History of echocardiogram 11/19/2008    Normal.   • History of nuclear stress test 11/19/2008    EF 66%, No evidence for ischemia.   • Hyperlipidemia    • Hypertension    • Missing tooth, acquired     Pt reports losing a tooth d/t chemotherapy   • Personal history of COVID-19 01/2022    mild symptoms, recovered at home   • PONV (postoperative nausea and vomiting)    • Tumor     Urethral   • Wears glasses      Past Surgical History:   Procedure Laterality Date   • APPENDECTOMY     • BLADDER AUGMENTATION     • CHOLECYSTECTOMY     • COLONOSCOPY     • CYSTOSCOPY  12/28/2020   • CYSTOSCOPY N/A 11/28/2022    Procedure: CYSTOSCOPY, excision of vaginal/periurethral mass transvaginally;  Surgeon: Quintin Greene MD;  Location: [facility];  Service: Urology   • FL CYSTOGRAM  12/19/2022   • HERNIA REPAIR Right     inguinal   • HYSTERECTOMY     • IR AORTAGRAM WITH RUN-OFF  06/14/2019   • KNEE ARTHROSCOPY Bilateral    • SD COLONOSCOPY FLX DX W/COLLJ SPEC WHEN PFRMD N/A 01/23/2018    Procedure: COLONOSCOPY;  Surgeon: Samira Goncalves DO;  Location: [facility];  Service: Gastroenterology   • SD SLCTV CATHJ 3RD+ ORD SLCTV ABDL PEL/LXTR BRNCH Left 06/14/2019    Procedure: ARTERIOGRAM-arteriography and possible endovascular intervention.;  Surgeon: Lee Cantu MD;  Location:  MAIN OR;  Service: Vascular   • PUBOVAGINAL SLING N/A 03/17/2021    Procedure: Open transvaginal urethral biopsy;  Surgeon: Quintin Greene MD;  Location:  MAIN OR;  Service: Urology   • REDUCTION MAMMAPLASTY     • SHOULDER SURGERY Left    • WISDOM TOOTH EXTRACTION     • WISDOM TOOTH EXTRACTION       Family History   Problem Relation Age of Onset   • Cancer Mother    • Heart disease Father    • Diabetes Father      Social History     Socioeconomic History   • Marital status: /Civil Union     Spouse  name: None   • Number of children: None   • Years of education: None   • Highest education level: None   Occupational History   • None   Tobacco Use   • Smoking status: Every Day     Current packs/day: 0.25     Average packs/day: 0.3 packs/day for 35.0 years (8.8 ttl pk-yrs)     Types: Cigarettes   • Smokeless tobacco: Never   Vaping Use   • Vaping status: Never Used   Substance and Sexual Activity   • Alcohol use: Not Currently     Alcohol/week: 0.0 standard drinks of alcohol     Comment: N/A   • Drug use: No   • Sexual activity: Not Currently   Other Topics Concern   • None   Social History Narrative   • None     Social Determinants of Health     Financial Resource Strain: Not on file   Food Insecurity: Not on file   Transportation Needs: Not on file   Physical Activity: Not on file   Stress: Not on file   Social Connections: Not on file   Intimate Partner Violence: Not on file   Housing Stability: Not on file     Current Outpatient Medications on File Prior to Visit   Medication Sig   • aspirin (ECOTRIN LOW STRENGTH) 81 mg EC tablet Take 81 mg by mouth daily   • dulaglutide (Trulicity) 0.75 MG/0.5ML injection Inject 0.5 mL (0.75 mg total) under the skin once a week   • Empagliflozin (Jardiance) 10 MG TABS tablet Take 1 tablet (10 mg total) by mouth every morning   • famotidine (PEPCID) 40 MG tablet Take 1 tablet (40 mg total) by mouth daily   • fluticasone (FLONASE) 50 mcg/act nasal spray 2 sprays into each nostril daily   • hydroxyurea (HYDREA) 500 mg capsule TAKE 1 CAPSULE BY MOUTH ON ODD NUMBERED DAYS AND 2 CAPSULES BY MOUTH ON EVEN NUMBERED DAYS   • nitrofurantoin (MACRODANTIN) 50 mg capsule Take 1 capsule (50 mg total) by mouth daily   • ondansetron (ZOFRAN-ODT) 4 mg disintegrating tablet Take 1 tablet (4 mg total) by mouth every 6 (six) hours as needed for nausea or vomiting   • oxybutynin (DITROPAN-XL) 10 MG 24 hr tablet Take 2 tablets (20 mg total) by mouth in the morning   • simvastatin (ZOCOR) 20 mg  "tablet Take 1 tablet (20 mg total) by mouth daily at bedtime   • [DISCONTINUED] losartan (COZAAR) 25 mg tablet Take 1 tablet (25 mg total) by mouth daily     Allergies   Allergen Reactions   • Chantix [Varenicline] Other (See Comments)     Psychiatric side effects   • Clomiphene Hives      Brand name - clomid   • Metformin      Lactic acidosis   • Wellbutrin [Bupropion] Other (See Comments)     Psychiatric side effects   • Latex Blisters     There is no immunization history for the selected administration types on file for this patient.    Objective     /90   Pulse 95   Temp 98.4 °F (36.9 °C)   Ht 5' 3\" (1.6 m)   Wt 67.7 kg (149 lb 3.2 oz)   SpO2 99%   BMI 26.43 kg/m²     Physical Exam  Vitals reviewed.   Constitutional:       General: She is not in acute distress.     Appearance: Normal appearance. She is well-developed. She is not ill-appearing, toxic-appearing or diaphoretic.   HENT:      Head: Normocephalic and atraumatic.      Nose: Congestion present.      Mouth/Throat:      Comments: Clear PND  Eyes:      Conjunctiva/sclera: Conjunctivae normal.   Cardiovascular:      Rate and Rhythm: Normal rate and regular rhythm.      Heart sounds: Normal heart sounds. No murmur heard.     No friction rub. No gallop.   Pulmonary:      Effort: Pulmonary effort is normal. No respiratory distress.      Breath sounds: Normal breath sounds. No wheezing, rhonchi or rales.   Musculoskeletal:      Right lower leg: No edema.      Left lower leg: No edema.   Neurological:      General: No focal deficit present.      Mental Status: She is alert and oriented to person, place, and time.   Psychiatric:         Mood and Affect: Mood normal.         Behavior: Behavior normal.         Thought Content: Thought content normal.         Judgment: Judgment normal.       Aakash Trejo, DO    "

## 2024-02-21 ENCOUNTER — TELEPHONE (OUTPATIENT)
Age: 59
End: 2024-02-21

## 2024-02-21 PROBLEM — N30.00 ACUTE CYSTITIS WITHOUT HEMATURIA: Status: RESOLVED | Noted: 2019-03-16 | Resolved: 2024-02-21

## 2024-02-21 NOTE — TELEPHONE ENCOUNTER
Pt called stated that she had medicaid and she will be qualified for supplies for incontinence but she will need a prior auth and fax to  medical supply home delivery.     Fax# [phone]   # [phone]   Direct line # [phone]

## 2024-02-21 NOTE — TELEPHONE ENCOUNTER
Returned call to patient to discuss incontinence supplies. Patient reports she requires extra absorbant pull ups and diapers- she utilizes about 16 packs/month. Also requesting 3 quilted, washable chucks pads per month. Physician order form, insurance info, demographics, and office notes faxed to [facility] at [phone]. Fax confirmation received.

## 2024-02-27 DIAGNOSIS — E11.65 TYPE 2 DIABETES MELLITUS WITH HYPERGLYCEMIA, WITHOUT LONG-TERM CURRENT USE OF INSULIN (HCC): ICD-10-CM

## 2024-02-27 DIAGNOSIS — Z29.89 NEED FOR PROPHYLAXIS AGAINST URINARY TRACT INFECTION: ICD-10-CM

## 2024-02-27 DIAGNOSIS — E78.2 MIXED HYPERLIPIDEMIA: ICD-10-CM

## 2024-02-27 RX ORDER — SIMVASTATIN 20 MG
20 TABLET ORAL
Qty: 90 TABLET | Refills: 1 | Status: SHIPPED | OUTPATIENT
Start: 2024-02-27

## 2024-02-28 RX ORDER — NITROFURANTOIN MACROCRYSTALS 50 MG/1
50 CAPSULE ORAL DAILY
Qty: 30 CAPSULE | Refills: 0 | Status: SHIPPED | OUTPATIENT
Start: 2024-02-28

## 2024-03-05 DIAGNOSIS — D45 POLYCYTHEMIA VERA (HCC): ICD-10-CM

## 2024-03-06 RX ORDER — HYDROXYUREA 500 MG/1
CAPSULE ORAL
Qty: 45 CAPSULE | Refills: 0 | Status: SHIPPED | OUTPATIENT
Start: 2024-03-06

## 2024-03-15 DIAGNOSIS — E11.65 TYPE 2 DIABETES MELLITUS WITH HYPERGLYCEMIA, WITHOUT LONG-TERM CURRENT USE OF INSULIN (HCC): ICD-10-CM

## 2024-03-15 RX ORDER — DULAGLUTIDE 0.75 MG/.5ML
0.75 INJECTION, SOLUTION SUBCUTANEOUS WEEKLY
Qty: 2 ML | Refills: 0 | Status: SHIPPED | OUTPATIENT
Start: 2024-03-15

## 2024-04-17 DIAGNOSIS — E11.65 TYPE 2 DIABETES MELLITUS WITH HYPERGLYCEMIA, WITHOUT LONG-TERM CURRENT USE OF INSULIN (HCC): ICD-10-CM

## 2024-04-17 RX ORDER — DULAGLUTIDE 0.75 MG/.5ML
0.75 INJECTION, SOLUTION SUBCUTANEOUS WEEKLY
Qty: 2 ML | Refills: 0 | Status: SHIPPED | OUTPATIENT
Start: 2024-04-17

## 2024-04-18 DIAGNOSIS — D45 POLYCYTHEMIA VERA (HCC): ICD-10-CM

## 2024-04-18 DIAGNOSIS — Z29.89 NEED FOR PROPHYLAXIS AGAINST URINARY TRACT INFECTION: ICD-10-CM

## 2024-04-19 RX ORDER — NITROFURANTOIN MACROCRYSTALS 50 MG/1
50 CAPSULE ORAL DAILY
Qty: 30 CAPSULE | Refills: 5 | Status: SHIPPED | OUTPATIENT
Start: 2024-04-19

## 2024-04-19 RX ORDER — HYDROXYUREA 500 MG/1
CAPSULE ORAL
Qty: 45 CAPSULE | Refills: 5 | Status: SHIPPED | OUTPATIENT
Start: 2024-04-19

## 2024-05-23 DIAGNOSIS — E11.65 TYPE 2 DIABETES MELLITUS WITH HYPERGLYCEMIA, WITHOUT LONG-TERM CURRENT USE OF INSULIN (HCC): ICD-10-CM

## 2024-05-23 RX ORDER — DULAGLUTIDE 0.75 MG/.5ML
0.75 INJECTION, SOLUTION SUBCUTANEOUS WEEKLY
Qty: 2 ML | Refills: 4 | Status: SHIPPED | OUTPATIENT
Start: 2024-05-23

## 2024-05-23 NOTE — TELEPHONE ENCOUNTER
Pt has no pens left and is due for her injection today 5/23    Reason for call:   [x] Refill   [] Prior Auth  [] Other:     Office:   [x] PCP/Provider - [facility]   [] Specialty/Provider -     Medication:   dulaglutide (Trulicity) 0.75 MG/0.5ML injection - Inject 0.5 mL (0.75 mg total) under the skin once a week     [address]     Does the patient have enough for 3 days?   [] Yes   [x] No - Send as HP to POD

## 2024-08-14 DIAGNOSIS — E78.2 MIXED HYPERLIPIDEMIA: ICD-10-CM

## 2024-08-14 DIAGNOSIS — E11.65 TYPE 2 DIABETES MELLITUS WITH HYPERGLYCEMIA, WITHOUT LONG-TERM CURRENT USE OF INSULIN (HCC): ICD-10-CM

## 2024-08-14 DIAGNOSIS — K29.00 ACUTE GASTRITIS WITHOUT HEMORRHAGE, UNSPECIFIED GASTRITIS TYPE: ICD-10-CM

## 2024-08-14 DIAGNOSIS — E11.9 TYPE 2 DIABETES MELLITUS WITHOUT COMPLICATION, WITHOUT LONG-TERM CURRENT USE OF INSULIN (HCC): ICD-10-CM

## 2024-08-14 NOTE — TELEPHONE ENCOUNTER
Reason for call:   [x] Refill   [] Prior Auth  [] Other:     Office:   [x] PCP/Provider -   [] Specialty/Provider -     Medication: Jardiance 10 mg, take 1 tablet by mouth every morning                        Pepcid 40 mg, take 1 tablet by mouth daily                         Losartan 25 mg, take 1 tablet by mouth daily                       Simvastatin 20 mg, take 1 tablet by mouth daily at bedtime         Pharmacy: [facility]     Does the patient have enough for 3 days?   [x] Yes   [] No - Send as HP to POD

## 2024-08-15 RX ORDER — LOSARTAN POTASSIUM 25 MG/1
25 TABLET ORAL DAILY
Qty: 30 TABLET | Refills: 0 | Status: SHIPPED | OUTPATIENT
Start: 2024-08-15

## 2024-08-15 RX ORDER — SIMVASTATIN 20 MG
20 TABLET ORAL
Qty: 30 TABLET | Refills: 0 | Status: SHIPPED | OUTPATIENT
Start: 2024-08-15

## 2024-08-15 RX ORDER — FAMOTIDINE 40 MG/1
40 TABLET, FILM COATED ORAL DAILY
Qty: 30 TABLET | Refills: 0 | Status: SHIPPED | OUTPATIENT
Start: 2024-08-15

## 2024-08-19 ENCOUNTER — TELEPHONE (OUTPATIENT)
Dept: HEMATOLOGY ONCOLOGY | Facility: CLINIC | Age: 59
End: 2024-08-19

## 2024-08-19 NOTE — TELEPHONE ENCOUNTER
Patient's appointment for 8/20/24 rescheduled to 9/10/24 due to not completing prior labs.  She is aware to complete the labs prior to her appointment in September.

## 2024-09-04 DIAGNOSIS — D45 POLYCYTHEMIA VERA (HCC): Primary | ICD-10-CM

## 2024-09-04 DIAGNOSIS — R71.8 ELEVATED MCV: ICD-10-CM

## 2024-09-06 DIAGNOSIS — E78.2 MIXED HYPERLIPIDEMIA: ICD-10-CM

## 2024-09-06 DIAGNOSIS — E11.65 TYPE 2 DIABETES MELLITUS WITH HYPERGLYCEMIA, WITHOUT LONG-TERM CURRENT USE OF INSULIN (HCC): ICD-10-CM

## 2024-09-06 RX ORDER — SIMVASTATIN 20 MG
20 TABLET ORAL
Qty: 30 TABLET | Refills: 1 | Status: SHIPPED | OUTPATIENT
Start: 2024-09-06 | End: 2024-09-11 | Stop reason: SDUPTHER

## 2024-09-06 NOTE — TELEPHONE ENCOUNTER
Pt needs annual visit appt  with new provider as she was with Dr RIVERO- she cx appt in July  I sent one month and one refill rx (has rx until early November)so needs appt to continue meds

## 2024-09-06 NOTE — TELEPHONE ENCOUNTER
Reason for call:   [x] Refill   [] Prior Auth  [] Other:     Office:   [x] PCP/Provider -   [] Specialty/Provider -     Medication: Empagliflozin (Jardiance) 10 MG 1 tablet daily     simvastatin (ZOCOR) 20 mg 1 tablet daily     Pharmacy: [facility]     Does the patient have enough for 3 days?   [] Yes   [x] No - Send as HP to POD

## 2024-09-07 ENCOUNTER — APPOINTMENT (OUTPATIENT)
Dept: LAB | Facility: MEDICAL CENTER | Age: 59
End: 2024-09-07
Payer: COMMERCIAL

## 2024-09-07 DIAGNOSIS — R71.8 ELEVATED MCV: ICD-10-CM

## 2024-09-07 DIAGNOSIS — D45 POLYCYTHEMIA VERA (HCC): ICD-10-CM

## 2024-09-07 LAB
ALBUMIN SERPL BCG-MCNC: 3.5 G/DL (ref 3.5–5)
ALP SERPL-CCNC: 72 U/L (ref 34–104)
ALT SERPL W P-5'-P-CCNC: 9 U/L (ref 7–52)
ANION GAP SERPL CALCULATED.3IONS-SCNC: 9 MMOL/L (ref 4–13)
AST SERPL W P-5'-P-CCNC: 11 U/L (ref 13–39)
BASOPHILS # BLD AUTO: 0.1 THOUSANDS/ÂΜL (ref 0–0.1)
BASOPHILS NFR BLD AUTO: 1 % (ref 0–1)
BILIRUB SERPL-MCNC: 0.44 MG/DL (ref 0.2–1)
BUN SERPL-MCNC: 11 MG/DL (ref 5–25)
CALCIUM SERPL-MCNC: 8.2 MG/DL (ref 8.4–10.2)
CHLORIDE SERPL-SCNC: 110 MMOL/L (ref 96–108)
CO2 SERPL-SCNC: 25 MMOL/L (ref 21–32)
CREAT SERPL-MCNC: 0.66 MG/DL (ref 0.6–1.3)
EOSINOPHIL # BLD AUTO: 0.25 THOUSAND/ÂΜL (ref 0–0.61)
EOSINOPHIL NFR BLD AUTO: 3 % (ref 0–6)
ERYTHROCYTE [DISTWIDTH] IN BLOOD BY AUTOMATED COUNT: 13.2 % (ref 11.6–15.1)
GFR SERPL CREATININE-BSD FRML MDRD: 97 ML/MIN/1.73SQ M
GLUCOSE P FAST SERPL-MCNC: 88 MG/DL (ref 65–99)
HCT VFR BLD AUTO: 38.6 % (ref 34.8–46.1)
HGB BLD-MCNC: 12.8 G/DL (ref 11.5–15.4)
IMM GRANULOCYTES # BLD AUTO: 0.03 THOUSAND/UL (ref 0–0.2)
IMM GRANULOCYTES NFR BLD AUTO: 0 % (ref 0–2)
LYMPHOCYTES # BLD AUTO: 3.06 THOUSANDS/ÂΜL (ref 0.6–4.47)
LYMPHOCYTES NFR BLD AUTO: 34 % (ref 14–44)
MCH RBC QN AUTO: 41.6 PG (ref 26.8–34.3)
MCHC RBC AUTO-ENTMCNC: 33.2 G/DL (ref 31.4–37.4)
MCV RBC AUTO: 125 FL (ref 82–98)
MONOCYTES # BLD AUTO: 0.6 THOUSAND/ÂΜL (ref 0.17–1.22)
MONOCYTES NFR BLD AUTO: 7 % (ref 4–12)
NEUTROPHILS # BLD AUTO: 5.06 THOUSANDS/ÂΜL (ref 1.85–7.62)
NEUTS SEG NFR BLD AUTO: 55 % (ref 43–75)
NRBC BLD AUTO-RTO: 0 /100 WBCS
PLATELET # BLD AUTO: 286 THOUSANDS/UL (ref 149–390)
PMV BLD AUTO: 9.8 FL (ref 8.9–12.7)
POTASSIUM SERPL-SCNC: 4.4 MMOL/L (ref 3.5–5.3)
PROT SERPL-MCNC: 6.3 G/DL (ref 6.4–8.4)
RBC # BLD AUTO: 3.08 MILLION/UL (ref 3.81–5.12)
SODIUM SERPL-SCNC: 144 MMOL/L (ref 135–147)
WBC # BLD AUTO: 9.1 THOUSAND/UL (ref 4.31–10.16)

## 2024-09-10 ENCOUNTER — TELEPHONE (OUTPATIENT)
Age: 59
End: 2024-09-10

## 2024-09-10 ENCOUNTER — TELEMEDICINE (OUTPATIENT)
Dept: HEMATOLOGY ONCOLOGY | Facility: CLINIC | Age: 59
End: 2024-09-10
Payer: COMMERCIAL

## 2024-09-10 DIAGNOSIS — Z29.89 NEED FOR PROPHYLAXIS AGAINST URINARY TRACT INFECTION: ICD-10-CM

## 2024-09-10 DIAGNOSIS — R71.8 ELEVATED MCV: ICD-10-CM

## 2024-09-10 DIAGNOSIS — D45 POLYCYTHEMIA VERA (HCC): Primary | ICD-10-CM

## 2024-09-10 PROCEDURE — G2012 BRIEF CHECK IN BY MD/QHP: HCPCS | Performed by: NURSE PRACTITIONER

## 2024-09-10 RX ORDER — HYDROXYUREA 500 MG/1
CAPSULE ORAL
Qty: 45 CAPSULE | Refills: 5 | Status: SHIPPED | OUTPATIENT
Start: 2024-09-10

## 2024-09-10 RX ORDER — NITROFURANTOIN MACROCRYSTALS 50 MG/1
50 CAPSULE ORAL DAILY
Qty: 30 CAPSULE | Refills: 5 | Status: SHIPPED | OUTPATIENT
Start: 2024-09-10

## 2024-09-10 NOTE — PROGRESS NOTES
Virtual Brief Visit  Name: Mary Kay      : 1965      MRN: [id number]  Encounter Provider: JUANITA Kent  Encounter Date: 9/10/2024   Encounter department: [facility] [place]    This Visit is being completed by telephone. The Patient is located at Home and in the following state in which I hold an active license [place]    The patient was identified by name and date of birth. Mary Kay was informed that this is a telemedicine visit and that the visit is being conducted through Telephone.  My office door was closed. No one else was in the room.  She acknowledged consent and understanding of privacy and security of the video platform. The patient has agreed to participate and understands they can discontinue the visit at any time.    Patient is aware this is a billable service.     Assessment & Plan  Polycythemia vera (HCC)    Orders:    hydroxyurea (HYDREA) 500 mg capsule; TAKE 1 CAPSULE BY MOUTH ON ODD NUMBERED DAYS AND 2 CAPSULES BY MOUTH ON EVEN NUMBERED DAYS    nitrofurantoin (MACRODANTIN) 50 mg capsule; Take 1 capsule (50 mg total) by mouth daily    CBC and differential; Standing    Comprehensive metabolic panel; Standing      Elevated MCV    Orders:    CBC and differential; Standing    Comprehensive metabolic panel; Standing      Need for prophylaxis against urinary tract infection    Orders:    nitrofurantoin (MACRODANTIN) 50 mg capsule; Take 1 capsule (50 mg total) by mouth daily    CBC and differential; Standing    Comprehensive metabolic panel; Standing    Patient is a 59-year-old female with a history of JAK2 negative polycythemia vera. She is currently on Hydrea 500 mg alternating with 1000 mg every other day. Overall tolerates without difficulty.  Most recent labs from 2024 show normal WBC, RBC 3.08, hemoglobin 12.8, hematocrit 38.6, , MCH 41.6, platelets 286, normal differential.  Patient did not have a CBC in .  RBC count has been  somewhat on the low side since November 2022 ranging between 3.08 up to 3.61.  Hemoglobin has been stable.  We discussed checking a CBC in 3-month intervals since RBC count is trending downward.  If we need to make an adjustment with the medication we will contact her.  Overall she is able to function without restriction but reports that she nor her  drive.  We will see her in 6 months as a virtual visit.  Patient is not up-to-date on mammogram however orders are in the system.  She is up-to-date on colonoscopy in 2018.  She verbalized understanding and agrees to the plan.    History of Present Illness   HPI    Visit Time  Total Visit Duration: 8 min

## 2024-09-10 NOTE — TELEPHONE ENCOUNTER
Patient calling in stating she was to receive a phone call from [facility] to review her lab results. I made her aware that this was to be an in person visit and she missed it. She stated that when she spoke to the person who scheduled it on 8/19/24 it was supposed to be scheduled as a telephone call. Please call her to review labs. [phone]

## 2024-09-10 NOTE — ASSESSMENT & PLAN NOTE
Orders:    hydroxyurea (HYDREA) 500 mg capsule; TAKE 1 CAPSULE BY MOUTH ON ODD NUMBERED DAYS AND 2 CAPSULES BY MOUTH ON EVEN NUMBERED DAYS    nitrofurantoin (MACRODANTIN) 50 mg capsule; Take 1 capsule (50 mg total) by mouth daily    CBC and differential; Standing    Comprehensive metabolic panel; Standing

## 2024-09-11 ENCOUNTER — OFFICE VISIT (OUTPATIENT)
Dept: FAMILY MEDICINE CLINIC | Facility: CLINIC | Age: 59
End: 2024-09-11
Payer: COMMERCIAL

## 2024-09-11 VITALS
BODY MASS INDEX: 26.58 KG/M2 | TEMPERATURE: 97.2 F | RESPIRATION RATE: 18 BRPM | OXYGEN SATURATION: 98 % | WEIGHT: 150 LBS | HEART RATE: 84 BPM | DIASTOLIC BLOOD PRESSURE: 78 MMHG | HEIGHT: 63 IN | SYSTOLIC BLOOD PRESSURE: 124 MMHG

## 2024-09-11 DIAGNOSIS — D45 POLYCYTHEMIA VERA (HCC): ICD-10-CM

## 2024-09-11 DIAGNOSIS — E78.2 MIXED HYPERLIPIDEMIA: ICD-10-CM

## 2024-09-11 DIAGNOSIS — K29.00 ACUTE GASTRITIS WITHOUT HEMORRHAGE, UNSPECIFIED GASTRITIS TYPE: ICD-10-CM

## 2024-09-11 DIAGNOSIS — R05.9 COUGH, UNSPECIFIED TYPE: ICD-10-CM

## 2024-09-11 DIAGNOSIS — J30.1 SEASONAL ALLERGIC RHINITIS DUE TO POLLEN: ICD-10-CM

## 2024-09-11 DIAGNOSIS — N39.41 URGE INCONTINENCE: ICD-10-CM

## 2024-09-11 DIAGNOSIS — R11.0 NAUSEA: ICD-10-CM

## 2024-09-11 DIAGNOSIS — E11.65 TYPE 2 DIABETES MELLITUS WITH HYPERGLYCEMIA, WITHOUT LONG-TERM CURRENT USE OF INSULIN (HCC): ICD-10-CM

## 2024-09-11 DIAGNOSIS — E11.9 TYPE 2 DIABETES MELLITUS WITHOUT COMPLICATION, WITHOUT LONG-TERM CURRENT USE OF INSULIN (HCC): ICD-10-CM

## 2024-09-11 DIAGNOSIS — Z00.00 ANNUAL PHYSICAL EXAM: Primary | ICD-10-CM

## 2024-09-11 PROBLEM — Z01.810 PREOP CARDIOVASCULAR EXAM: Status: RESOLVED | Noted: 2022-10-14 | Resolved: 2024-09-11

## 2024-09-11 PROBLEM — K92.2 GI BLEED: Status: RESOLVED | Noted: 2019-03-17 | Resolved: 2024-09-11

## 2024-09-11 LAB
LEFT EYE DIABETIC RETINOPATHY: ABNORMAL
LEFT EYE IMAGE QUALITY: ABNORMAL
LEFT EYE MACULAR EDEMA: ABNORMAL
LEFT EYE OTHER RETINOPATHY: ABNORMAL
RIGHT EYE DIABETIC RETINOPATHY: ABNORMAL
RIGHT EYE IMAGE QUALITY: ABNORMAL
RIGHT EYE MACULAR EDEMA: ABNORMAL
RIGHT EYE OTHER RETINOPATHY: ABNORMAL
SEVERITY (EYE EXAM): ABNORMAL

## 2024-09-11 PROCEDURE — 99396 PREV VISIT EST AGE 40-64: CPT | Performed by: INTERNAL MEDICINE

## 2024-09-11 PROCEDURE — 92250 FUNDUS PHOTOGRAPHY W/I&R: CPT | Performed by: INTERNAL MEDICINE

## 2024-09-11 RX ORDER — OXYBUTYNIN CHLORIDE 10 MG/1
20 TABLET, EXTENDED RELEASE ORAL DAILY
Qty: 60 TABLET | Refills: 12 | Status: SHIPPED | OUTPATIENT
Start: 2024-09-11

## 2024-09-11 RX ORDER — NITROFURANTOIN MACROCRYSTALS 50 MG/1
50 CAPSULE ORAL DAILY
Qty: 30 CAPSULE | Refills: 5 | Status: CANCELLED | OUTPATIENT
Start: 2024-09-11

## 2024-09-11 RX ORDER — HYDROXYUREA 500 MG/1
CAPSULE ORAL
Qty: 45 CAPSULE | Refills: 5 | Status: CANCELLED | OUTPATIENT
Start: 2024-09-11

## 2024-09-11 RX ORDER — FAMOTIDINE 40 MG/1
40 TABLET, FILM COATED ORAL DAILY
Qty: 30 TABLET | Refills: 5 | Status: SHIPPED | OUTPATIENT
Start: 2024-09-11

## 2024-09-11 RX ORDER — DULAGLUTIDE 0.75 MG/.5ML
0.75 INJECTION, SOLUTION SUBCUTANEOUS WEEKLY
Qty: 2 ML | Refills: 5 | Status: SHIPPED | OUTPATIENT
Start: 2024-09-11

## 2024-09-11 RX ORDER — BENZONATATE 100 MG/1
100 CAPSULE ORAL 3 TIMES DAILY PRN
Qty: 20 CAPSULE | Refills: 5 | Status: SHIPPED | OUTPATIENT
Start: 2024-09-11

## 2024-09-11 RX ORDER — LOSARTAN POTASSIUM 25 MG/1
25 TABLET ORAL DAILY
Qty: 30 TABLET | Refills: 5 | Status: SHIPPED | OUTPATIENT
Start: 2024-09-11 | End: 2024-09-20 | Stop reason: SDUPTHER

## 2024-09-11 RX ORDER — SIMVASTATIN 20 MG
20 TABLET ORAL
Qty: 30 TABLET | Refills: 5 | Status: SHIPPED | OUTPATIENT
Start: 2024-09-11

## 2024-09-11 RX ORDER — ONDANSETRON 4 MG/1
4 TABLET, ORALLY DISINTEGRATING ORAL EVERY 6 HOURS PRN
Qty: 20 TABLET | Refills: 5 | Status: SHIPPED | OUTPATIENT
Start: 2024-09-11

## 2024-09-11 RX ORDER — FLUTICASONE PROPIONATE 50 MCG
2 SPRAY, SUSPENSION (ML) NASAL DAILY
Qty: 16 G | Refills: 5 | Status: SHIPPED | OUTPATIENT
Start: 2024-09-11

## 2024-09-11 NOTE — PATIENT INSTRUCTIONS
"Patient Education     Routine physical for adults   The Basics   Written by the doctors and editors at [facility]   What is a physical? -- A physical is a routine visit, or \"check-up,\" with your doctor. You might also hear it called a \"wellness visit\" or \"preventive visit.\"  During each visit, the doctor will:   Ask about your physical and mental health   Ask about your habits, behaviors, and lifestyle   Do an exam   Give you vaccines if needed   Talk to you about any medicines you take   Give advice about your health   Answer your questions  Getting regular check-ups is an important part of taking care of your health. It can help your doctor find and treat any problems you have. But it's also important for preventing health problems.  A routine physical is different from a \"sick visit.\" A sick visit is when you see a doctor because of a health concern or problem. Since physicals are scheduled ahead of time, you can think about what you want to ask the doctor.  How often should I get a physical? -- It depends on your age and health. In general, for people age 21 years and older:   If you are younger than 50 years, you might be able to get a physical every 3 years.   If you are 50 years or older, your doctor might recommend a physical every year.  If you have an ongoing health condition, like diabetes or high blood pressure, your doctor will probably want to see you more often.  What happens during a physical? -- In general, each visit will include:   Physical exam - The doctor or nurse will check your height, weight, heart rate, and blood pressure. They will also look at your eyes and ears. They will ask about how you are feeling and whether you have any symptoms that bother you.   Medicines - It's a good idea to bring a list of all the medicines you take to each doctor visit. Your doctor will talk to you about your medicines and answer any questions. Tell them if you are having any side effects that bother you. You " "should also tell them if you are having trouble paying for any of your medicines.   Habits and behaviors - This includes:   Your diet   Your exercise habits   Whether you smoke, drink alcohol, or use drugs   Whether you are sexually active   Whether you feel safe at home  Your doctor will talk to you about things you can do to improve your health and lower your risk of health problems. They will also offer help and support. For example, if you want to quit smoking, they can give you advice and might prescribe medicines. If you want to improve your diet or get more physical activity, they can help you with this, too.   Lab tests, if needed - The tests you get will depend on your age and situation. For example, your doctor might want to check your:   Cholesterol   Blood sugar   Iron level   Vaccines - The recommended vaccines will depend on your age, health, and what vaccines you already had. Vaccines are very important because they can prevent certain serious or deadly infections.   Discussion of screening - \"Screening\" means checking for diseases or other health problems before they cause symptoms. Your doctor can recommend screening based on your age, risk, and preferences. This might include tests to check for:   Cancer, such as breast, prostate, cervical, ovarian, colorectal, prostate, lung, or skin cancer   Sexually transmitted infections, such as chlamydia and gonorrhea   Mental health conditions like depression and anxiety  Your doctor will talk to you about the different types of screening tests. They can help you decide which screenings to have. They can also explain what the results might mean.   Answering questions - The physical is a good time to ask the doctor or nurse questions about your health. If needed, they can refer you to other doctors or specialists, too.  Adults older than 65 years often need other care, too. As you get older, your doctor will talk to you about:   How to prevent falling at " home   Hearing or vision tests   Memory testing   How to take your medicines safely   Making sure that you have the help and support you need at home  All topics are updated as new evidence becomes available and our peer review process is complete.  This topic retrieved from [employer] on: May 02, 2024.  Topic 473422 Version 1.0  Release: 32.4.3 - C32.122  © 2024 UpToDate, Inc. and/or its affiliates. All rights reserved.  Consumer Information Use and Disclaimer   Disclaimer: This generalized information is a limited summary of diagnosis, treatment, and/or medication information. It is not meant to be comprehensive and should be used as a tool to help the user understand and/or assess potential diagnostic and treatment options. It does NOT include all information about conditions, treatments, medications, side effects, or risks that may apply to a specific patient. It is not intended to be medical advice or a substitute for the medical advice, diagnosis, or treatment of a health care provider based on the health care provider's examination and assessment of a patient's specific and unique circumstances. Patients must speak with a health care provider for complete information about their health, medical questions, and treatment options, including any risks or benefits regarding use of medications. This information does not endorse any treatments or medications as safe, effective, or approved for treating a specific patient. UpToDate, Inc. and its affiliates disclaim any warranty or liability relating to this information or the use thereof.The use of this information is governed by the Terms of Use, available at https://www.woltersCrumpet Cashmereuwer.com/en/know/clinical-effectiveness-terms. 2024© UpToDate, Inc. and its affiliates and/or licensors. All rights reserved.  Copyright   © 2024 UpToDate, Inc. and/or its affiliates. All rights reserved.

## 2024-09-11 NOTE — ASSESSMENT & PLAN NOTE
Lab Results   Component Value Date    HGBA1C 6.2 02/16/2024   Iris today and encouraged eye exam formally to be scheduled  Recheck labs with upcoming ordered labs     Orders:    dulaglutide (Trulicity) 0.75 MG/0.5ML injection; Inject 0.5 mL (0.75 mg total) under the skin once a week    Empagliflozin (Jardiance) 10 MG TABS tablet; Take 1 tablet (10 mg total) by mouth every morning    Albumin / creatinine urine ratio; Future    Comprehensive metabolic panel; Future    Hemoglobin A1C; Future    Lipid Panel with Direct LDL reflex; Future    IRIS Diabetic eye exam

## 2024-09-11 NOTE — PROGRESS NOTES
Adult Annual Physical  Name: Mary Kay      : 1965      MRN: [id number]  Encounter Provider: Velma Flores DO  Encounter Date: 2024   Encounter department: [facility]    Assessment & Plan  Cough, unspecified type    Orders:  •  benzonatate (TESSALON PERLES) 100 mg capsule; Take 1 capsule (100 mg total) by mouth 3 (three) times a day as needed for cough  Encouraged smoking cessation and adequate hydration   Type 2 diabetes mellitus with hyperglycemia, without long-term current use of insulin ([facility])    Lab Results   Component Value Date    HGBA1C 6.2 2024   Iris today and encouraged eye exam formally to be scheduled  Recheck labs with upcoming ordered labs     Orders:  •  dulaglutide (Trulicity) 0.75 MG/0.5ML injection; Inject 0.5 mL (0.75 mg total) under the skin once a week  •  Empagliflozin (Jardiance) 10 MG TABS tablet; Take 1 tablet (10 mg total) by mouth every morning  •  Albumin / creatinine urine ratio; Future  •  Comprehensive metabolic panel; Future  •  Hemoglobin A1C; Future  •  Lipid Panel with Direct LDL reflex; Future  •  IRIS Diabetic eye exam    Acute gastritis without hemorrhage, unspecified gastritis type    Orders:  •  famotidine (PEPCID) 40 MG tablet; Take 1 tablet (40 mg total) by mouth daily  Sxs managed with pepcid and pt is followed by hematology Hgb wnl   Polycythemia vera (HCC)  Pt managed by Hematology and on hydroxyurea        Seasonal allergic rhinitis due to pollen    Orders:  •  fluticasone (FLONASE) 50 mcg/act nasal spray; 2 sprays into each nostril daily    Type 2 diabetes mellitus without complication, without long-term current use of insulin ([facility])    Lab Results   Component Value Date    HGBA1C 6.2 2024   Lo carb diet and continue current rx as A1c has been stable     Orders:  •  losartan (COZAAR) 25 mg tablet; Take 1 tablet (25 mg total) by mouth daily    Mixed hyperlipidemia    Orders:  •  simvastatin (ZOCOR) 20 mg tablet; Take 1 tablet  (20 mg total) by mouth daily at bedtime    Urge incontinence    Orders:  •  oxybutynin (DITROPAN-XL) 10 MG 24 hr tablet; Take 2 tablets (20 mg total) by mouth in the morning    Nausea    Orders:  •  ondansetron (ZOFRAN-ODT) 4 mg disintegrating tablet; Take 1 tablet (4 mg total) by mouth every 6 (six) hours as needed for nausea or vomiting    Annual physical exam  Labs ordered to be done with upcoming Hematology repeat labs  Increase protein in diet  Encouraged smoking cessation  Stay hydrated        Immunizations and preventive care screenings were discussed with patient today. Appropriate education was printed on patient's after visit summary.    Counseling:  Exercise: the importance of regular exercise/physical activity was discussed. Recommend exercise 3-5 times per week for at least 30 minutes.       Depression Screening and Follow-up Plan: Patient was screened for depression during today's encounter. They screened negative with a PHQ-2 score of 0.    Tobacco Cessation Counseling: Tobacco cessation counseling was provided. The patient is sincerely urged to quit consumption of tobacco. She is not ready to quit tobacco.   Rto 3 months     History of Present Illness   Pt transferring from Dr RIVERO She is doing ok but follows with Hematology for P Vera and is on oral therapy She is tired often She has hx of diabetes but has been stable on current rx She does smoke No chest pain or sob She just had visit with Hematology yesterday     Adult Annual Physical:  Patient presents for annual physical.     Diet and Physical Activity:  - Diet/Nutrition: limited junk food.  - Exercise: no formal exercise.    Depression Screening:  - PHQ-2 Score: 0    General Health:  - Sleep: 4-6 hours of sleep on average.  - Hearing: normal hearing bilateral ears.  - Vision: most recent eye exam > 1 year ago.  - Dental: no dental visits for > 1 year.    /GYN Health:  - Follows with GYN: no.   - Menopause: postmenopausal.   - History of STDs:  no  - Contraception: barrier methods.      Advanced Care Planning:  - Has an advanced directive?: no    - Has a durable medical POA?: no    - ACP document given to patient?: no      Review of Systems   Constitutional:  Negative for chills and fever.   HENT: Negative.     Eyes:  Negative for visual disturbance.   Respiratory:  Negative for cough and shortness of breath.    Cardiovascular:  Negative for chest pain, palpitations and leg swelling.   Gastrointestinal: Negative.    Genitourinary:  Positive for frequency.   Musculoskeletal:  Positive for arthralgias.   Skin:  Negative for color change.   Neurological:  Negative for dizziness, light-headedness and headaches.   Psychiatric/Behavioral:  Negative for sleep disturbance. The patient is not nervous/anxious.      Past Medical History:   Diagnosis Date   • Arthritis    • Cancer (HCC)      chronic leukemia   • Chronic kidney disease     cyst in left kidney.   • Diabetes mellitus (HCC)    • Frequent sinus infections     Pt states she has a mass in left side of sinus   • GERD (gastroesophageal reflux disease)    • GI bleed 03/17/2019   • History of echocardiogram 11/19/2008    Normal.   • History of nuclear stress test 11/19/2008    EF 66%, No evidence for ischemia.   • Hyperlipidemia    • Hypertension    • Missing tooth, acquired     Pt reports losing a tooth d/t chemotherapy   • Personal history of COVID-19 01/2022    mild symptoms, recovered at home   • PONV (postoperative nausea and vomiting)    • Tumor     Urethral   • Wears glasses      Past Surgical History:   Procedure Laterality Date   • APPENDECTOMY     • BLADDER AUGMENTATION     • CHOLECYSTECTOMY     • COLONOSCOPY     • CYSTOSCOPY  12/28/2020   • CYSTOSCOPY N/A 11/28/2022    Procedure: CYSTOSCOPY, excision of vaginal/periurethral mass transvaginally;  Surgeon: Quintin Greene MD;  Location: [facility];  Service: Urology   • FL CYSTOGRAM  12/19/2022   • HERNIA REPAIR Right     inguinal   • HYSTERECTOMY     • IR  AORTAGRAM WITH RUN-OFF  06/14/2019   • KNEE ARTHROSCOPY Bilateral    • ME COLONOSCOPY FLX DX W/COLLJ SPEC WHEN PFRMD N/A 01/23/2018    Procedure: COLONOSCOPY;  Surgeon: Samira Goncalves DO;  Location: [facility];  Service: Gastroenterology   • ME SLCTV CATHJ 3RD+ ORD SLCTV ABDL PEL/LXTR BRNCH Left 06/14/2019    Procedure: ARTERIOGRAM-arteriography and possible endovascular intervention.;  Surgeon: Lee Cantu MD;  Location:  MAIN OR;  Service: Vascular   • PUBOVAGINAL SLING N/A 03/17/2021    Procedure: Open transvaginal urethral biopsy;  Surgeon: Quintin Greene MD;  Location:  MAIN OR;  Service: Urology   • REDUCTION MAMMAPLASTY     • SHOULDER SURGERY Left    • WISDOM TOOTH EXTRACTION     • WISDOM TOOTH EXTRACTION       Social History     Socioeconomic History   • Marital status: /Civil Union     Spouse name: Not on file   • Number of children: Not on file   • Years of education: Not on file   • Highest education level: Not on file   Occupational History   • Not on file   Tobacco Use   • Smoking status: Every Day     Current packs/day: 0.25     Average packs/day: 0.3 packs/day for 35.0 years (8.8 ttl pk-yrs)     Types: Cigarettes   • Smokeless tobacco: Never   Vaping Use   • Vaping status: Never Used   Substance and Sexual Activity   • Alcohol use: Not Currently     Alcohol/week: 0.0 standard drinks of alcohol     Comment: N/A   • Drug use: No   • Sexual activity: Not Currently   Other Topics Concern   • Not on file   Social History Narrative   • Not on file     Social Determinants of Health     Financial Resource Strain: Not on file   Food Insecurity: Not on file   Transportation Needs: Not on file   Physical Activity: Not on file   Stress: Not on file   Social Connections: Unknown (6/18/2024)    Received from [employer]    Social Connections    • How often do you feel lonely or isolated from those around you? (Adult - for ages 18 years and over): Not on file   Intimate Partner Violence: Not on file  "  Housing Stability: Not on file     Allergies   Allergen Reactions   • Chantix [Varenicline] Other (See Comments)     Psychiatric side effects   • Clomiphene Hives      Brand name - clomid   • Metformin      Lactic acidosis   • Wellbutrin [Bupropion] Other (See Comments)     Psychiatric side effects   • Latex Blisters     Diabetic Foot Exam    Patient's shoes and socks removed.    Right Foot/Ankle   Right Foot Inspection  Skin Exam: skin normal, skin intact, callus and callus. No dry skin, no warmth, no erythema, no maceration, no abnormal color, no pre-ulcer and no ulcer.     Toe Exam: ROM and strength within normal limits and right toe deformity. No swelling, no tenderness and erythema    Sensory   Vibration: intact  Proprioception: intact  Monofilament testing: intact    Vascular  The right DP pulse is 2+. The right PT pulse is 2+.     Left Foot/Ankle  Left Foot Inspection  Skin Exam: skin normal and skin intact. No dry skin, no warmth, no erythema, no maceration, normal color, no pre-ulcer, no ulcer and no callus.     Toe Exam: ROM and strength within normal limits. No swelling, no tenderness, no erythema and no left toe deformity.     Sensory   Vibration: intact  Proprioception: intact  Monofilament testing: intact    Vascular  The left DP pulse is 2+. The left PT pulse is 2+.     Assign Risk Category  Deformity present  No loss of protective sensation  No weak pulses  Risk: 0      Objective     /78   Pulse 84   Temp (!) 97.2 °F (36.2 °C) (Temporal)   Resp 18   Ht 5' 3\" (1.6 m)   Wt 68 kg (150 lb)   SpO2 98%   BMI 26.57 kg/m²     Physical Exam  Vitals and nursing note reviewed.   Constitutional:       General: She is not in acute distress.     Appearance: Normal appearance. She is not ill-appearing, toxic-appearing or diaphoretic.   HENT:      Head: Normocephalic and atraumatic.      Right Ear: External ear normal.      Left Ear: External ear normal.      Nose: Nose normal.      Mouth/Throat:      " Mouth: Mucous membranes are dry.   Eyes:      General: No scleral icterus.  Cardiovascular:      Rate and Rhythm: Normal rate.      Pulses: Normal pulses. no weak pulses.           Dorsalis pedis pulses are 2+ on the right side and 2+ on the left side.        Posterior tibial pulses are 2+ on the right side and 2+ on the left side.   Pulmonary:      Effort: Pulmonary effort is normal. No respiratory distress.      Breath sounds: Normal breath sounds. No wheezing.   Abdominal:      General: Bowel sounds are normal. There is no distension.      Palpations: Abdomen is soft.      Tenderness: There is no abdominal tenderness.   Musculoskeletal:         General: Normal range of motion.      Cervical back: Normal range of motion and neck supple.      Right lower leg: No edema.      Left lower leg: No edema.   Feet:      Right foot:      Skin integrity: Callus present. No ulcer, skin breakdown, erythema, warmth or dry skin.      Left foot:      Skin integrity: No ulcer, skin breakdown, erythema, warmth, callus or dry skin.   Lymphadenopathy:      Cervical: No cervical adenopathy.   Skin:     General: Skin is warm and dry.      Coloration: Skin is not jaundiced or pale.   Neurological:      General: No focal deficit present.      Mental Status: She is alert and oriented to person, place, and time. Mental status is at baseline.      Cranial Nerves: No cranial nerve deficit.      Sensory: No sensory deficit.   Psychiatric:         Mood and Affect: Mood normal.         Behavior: Behavior normal.         Thought Content: Thought content normal.         Judgment: Judgment normal.

## 2024-09-16 ENCOUNTER — VBI (OUTPATIENT)
Dept: ADMINISTRATIVE | Facility: OTHER | Age: 59
End: 2024-09-16

## 2024-09-16 NOTE — TELEPHONE ENCOUNTER
09/16/24 6:46 AM     Chart reviewed for Diabetic Eye Exam ; nothing is submitted to the patient's insurance at this time.     Shawna Rodarte   [facility]

## 2024-09-20 DIAGNOSIS — E11.9 TYPE 2 DIABETES MELLITUS WITHOUT COMPLICATION, WITHOUT LONG-TERM CURRENT USE OF INSULIN (HCC): ICD-10-CM

## 2024-09-20 RX ORDER — LOSARTAN POTASSIUM 25 MG/1
25 TABLET ORAL DAILY
Qty: 90 TABLET | Refills: 1 | Status: SHIPPED | OUTPATIENT
Start: 2024-09-20

## 2024-09-20 NOTE — TELEPHONE ENCOUNTER
NOT A DUPLICATE insurance is requiring a 90 day supply    Reason for call:   [x] Refill   [] Prior Auth  [] Other:     Office:   [x] PCP/Provider -   [] Specialty/Provider -     Medication: losartan (COZAAR) 25 mg tablet Take 1 tablet (25 mg total) by mouth daily       Pharmacy: [facility] [address] [place] [address] [address], [address]     Does the patient have enough for 3 days?   [] Yes   [x] No - Send as HP to POD

## 2024-10-23 ENCOUNTER — TELEPHONE (OUTPATIENT)
Age: 59
End: 2024-10-23

## 2024-10-23 DIAGNOSIS — K08.89 PAIN, DENTAL: Primary | ICD-10-CM

## 2024-10-23 RX ORDER — AMOXICILLIN 500 MG/1
500 CAPSULE ORAL EVERY 8 HOURS SCHEDULED
Qty: 21 CAPSULE | Refills: 0 | Status: SHIPPED | OUTPATIENT
Start: 2024-10-23 | End: 2024-10-30

## 2024-10-23 NOTE — TELEPHONE ENCOUNTER
Phone call from patient stating she broke part of her tooth off and there still is part of it in her gum and it is red and looks infected . Patient unable to get in to oral surgeon for a lil bit and was wondering if an antibiotic could be called in for her til she is seen for her appointment with stacie.  Patient uses [facility].  Please advise. Thank you.

## 2024-12-10 ENCOUNTER — APPOINTMENT (OUTPATIENT)
Dept: LAB | Facility: MEDICAL CENTER | Age: 59
End: 2024-12-10
Payer: COMMERCIAL

## 2024-12-10 DIAGNOSIS — Z29.89 NEED FOR PROPHYLAXIS AGAINST URINARY TRACT INFECTION: ICD-10-CM

## 2024-12-10 DIAGNOSIS — R71.8 ELEVATED MCV: ICD-10-CM

## 2024-12-10 DIAGNOSIS — D45 POLYCYTHEMIA VERA (HCC): ICD-10-CM

## 2024-12-10 DIAGNOSIS — E11.65 TYPE 2 DIABETES MELLITUS WITH HYPERGLYCEMIA, WITHOUT LONG-TERM CURRENT USE OF INSULIN (HCC): ICD-10-CM

## 2024-12-10 LAB
BASOPHILS # BLD AUTO: 0.07 THOUSANDS/ÂΜL (ref 0–0.1)
BASOPHILS NFR BLD AUTO: 1 % (ref 0–1)
EOSINOPHIL # BLD AUTO: 0.19 THOUSAND/ÂΜL (ref 0–0.61)
EOSINOPHIL NFR BLD AUTO: 2 % (ref 0–6)
ERYTHROCYTE [DISTWIDTH] IN BLOOD BY AUTOMATED COUNT: 12.8 % (ref 11.6–15.1)
HCT VFR BLD AUTO: 41.5 % (ref 34.8–46.1)
HGB BLD-MCNC: 13.7 G/DL (ref 11.5–15.4)
IMM GRANULOCYTES # BLD AUTO: 0.07 THOUSAND/UL (ref 0–0.2)
IMM GRANULOCYTES NFR BLD AUTO: 1 % (ref 0–2)
LYMPHOCYTES # BLD AUTO: 2.56 THOUSANDS/ÂΜL (ref 0.6–4.47)
LYMPHOCYTES NFR BLD AUTO: 24 % (ref 14–44)
MCH RBC QN AUTO: 38.7 PG (ref 26.8–34.3)
MCHC RBC AUTO-ENTMCNC: 33 G/DL (ref 31.4–37.4)
MCV RBC AUTO: 117 FL (ref 82–98)
MONOCYTES # BLD AUTO: 0.37 THOUSAND/ÂΜL (ref 0.17–1.22)
MONOCYTES NFR BLD AUTO: 3 % (ref 4–12)
NEUTROPHILS # BLD AUTO: 7.48 THOUSANDS/ÂΜL (ref 1.85–7.62)
NEUTS SEG NFR BLD AUTO: 69 % (ref 43–75)
NRBC BLD AUTO-RTO: 0 /100 WBCS
PLATELET # BLD AUTO: 262 THOUSANDS/UL (ref 149–390)
PMV BLD AUTO: 10.2 FL (ref 8.9–12.7)
RBC # BLD AUTO: 3.54 MILLION/UL (ref 3.81–5.12)
WBC # BLD AUTO: 10.74 THOUSAND/UL (ref 4.31–10.16)

## 2024-12-10 PROCEDURE — 80061 LIPID PANEL: CPT

## 2024-12-10 PROCEDURE — 82570 ASSAY OF URINE CREATININE: CPT

## 2024-12-10 PROCEDURE — 36415 COLL VENOUS BLD VENIPUNCTURE: CPT

## 2024-12-10 PROCEDURE — 82043 UR ALBUMIN QUANTITATIVE: CPT

## 2024-12-10 PROCEDURE — 80053 COMPREHEN METABOLIC PANEL: CPT

## 2024-12-10 PROCEDURE — 85025 COMPLETE CBC W/AUTO DIFF WBC: CPT

## 2024-12-10 PROCEDURE — 83036 HEMOGLOBIN GLYCOSYLATED A1C: CPT

## 2024-12-11 LAB
ALBUMIN SERPL BCG-MCNC: 3.8 G/DL (ref 3.5–5)
ALP SERPL-CCNC: 76 U/L (ref 34–104)
ALT SERPL W P-5'-P-CCNC: 8 U/L (ref 7–52)
ANION GAP SERPL CALCULATED.3IONS-SCNC: 10 MMOL/L (ref 4–13)
AST SERPL W P-5'-P-CCNC: 13 U/L (ref 13–39)
BILIRUB SERPL-MCNC: 0.53 MG/DL (ref 0.2–1)
BUN SERPL-MCNC: 13 MG/DL (ref 5–25)
CALCIUM SERPL-MCNC: 8.9 MG/DL (ref 8.4–10.2)
CHLORIDE SERPL-SCNC: 104 MMOL/L (ref 96–108)
CHOLEST SERPL-MCNC: 142 MG/DL (ref ?–200)
CO2 SERPL-SCNC: 26 MMOL/L (ref 21–32)
CREAT SERPL-MCNC: 0.7 MG/DL (ref 0.6–1.3)
CREAT UR-MCNC: 157.2 MG/DL
EST. AVERAGE GLUCOSE BLD GHB EST-MCNC: 134 MG/DL
GFR SERPL CREATININE-BSD FRML MDRD: 95 ML/MIN/1.73SQ M
GLUCOSE SERPL-MCNC: 63 MG/DL (ref 65–140)
HBA1C MFR BLD: 6.3 %
HDLC SERPL-MCNC: 57 MG/DL
LDLC SERPL CALC-MCNC: 68 MG/DL (ref 0–100)
MICROALBUMIN UR-MCNC: 19.9 MG/L
MICROALBUMIN/CREAT 24H UR: 13 MG/G CREATININE (ref 0–30)
POTASSIUM SERPL-SCNC: 4.1 MMOL/L (ref 3.5–5.3)
PROT SERPL-MCNC: 6.6 G/DL (ref 6.4–8.4)
SODIUM SERPL-SCNC: 140 MMOL/L (ref 135–147)
TRIGL SERPL-MCNC: 83 MG/DL (ref ?–150)

## 2024-12-12 ENCOUNTER — OFFICE VISIT (OUTPATIENT)
Dept: FAMILY MEDICINE CLINIC | Facility: CLINIC | Age: 59
End: 2024-12-12
Payer: COMMERCIAL

## 2024-12-12 VITALS
RESPIRATION RATE: 18 BRPM | BODY MASS INDEX: 26.93 KG/M2 | TEMPERATURE: 97.5 F | HEIGHT: 63 IN | WEIGHT: 152 LBS | OXYGEN SATURATION: 99 % | DIASTOLIC BLOOD PRESSURE: 70 MMHG | HEART RATE: 87 BPM | SYSTOLIC BLOOD PRESSURE: 126 MMHG

## 2024-12-12 DIAGNOSIS — I74.09 AORTOILIAC OCCLUSIVE DISEASE (HCC): ICD-10-CM

## 2024-12-12 DIAGNOSIS — J01.00 ACUTE MAXILLARY SINUSITIS, RECURRENCE NOT SPECIFIED: ICD-10-CM

## 2024-12-12 DIAGNOSIS — I10 ESSENTIAL HYPERTENSION: ICD-10-CM

## 2024-12-12 DIAGNOSIS — D45 POLYCYTHEMIA VERA (HCC): ICD-10-CM

## 2024-12-12 DIAGNOSIS — E11.65 TYPE 2 DIABETES MELLITUS WITH HYPERGLYCEMIA, WITHOUT LONG-TERM CURRENT USE OF INSULIN (HCC): Primary | ICD-10-CM

## 2024-12-12 PROCEDURE — 99214 OFFICE O/P EST MOD 30 MIN: CPT | Performed by: INTERNAL MEDICINE

## 2024-12-12 NOTE — ASSESSMENT & PLAN NOTE
Lab Results   Component Value Date    HGBA1C 6.3 (H) 12/10/2024       Orders:    Hemoglobin A1C; Future

## 2024-12-12 NOTE — PROGRESS NOTES
Name: Mary Kay      : 1965      MRN: [id number]  Encounter Provider: Velma Flores DO  Encounter Date: 2024   Encounter department: [place] [facility]  :  Assessment & Plan  Type 2 diabetes mellitus with hyperglycemia, without long-term current use of insulin (HCC)    Lab Results   Component Value Date    HGBA1C 6.3 (H) 12/10/2024       Orders:    Hemoglobin A1C; Future    Essential hypertension         Polycythemia vera (HCC)         Aortoiliac occlusive disease (HCC)    Orders:    VAS abdominal aorta/iliac duplex; Future    Acute maxillary sinusitis, recurrence not specified    Orders:    amoxicillin-clavulanate (AUGMENTIN) 875-125 mg per tablet; Take 1 tablet by mouth 2 (two) times a day for 7 days      Rto 3 months      History of Present Illness     HPI  Pt doing ok had labs for today BS stable She is still smoking Has had chills and sinus pressure for 2 weeks Occasional cough No sob No falls She feels more tired occasional dizziness Tolerating diet Trying stay hydrated Facial pressure from sinus sxs She does not go out much but prefers that has been more tired recently   Review of Systems   Constitutional:  Negative for chills and fever.   HENT:  Positive for congestion, postnasal drip and sinus pressure.    Eyes:  Negative for visual disturbance.   Respiratory:  Positive for cough. Negative for shortness of breath.    Cardiovascular:  Negative for chest pain, palpitations and leg swelling.   Gastrointestinal:  Negative for abdominal distention and abdominal pain.   Genitourinary: Negative.    Musculoskeletal:  Positive for arthralgias.   Neurological:  Positive for headaches. Negative for dizziness and light-headedness.   Psychiatric/Behavioral:  Negative for sleep disturbance. The patient is not nervous/anxious.      Past Medical History:   Diagnosis Date    Arthritis     Cancer (HCC)      chronic leukemia    Chronic kidney disease     cyst in left kidney.    Diabetes mellitus  (HCC)     Frequent sinus infections     Pt states she has a mass in left side of sinus    GERD (gastroesophageal reflux disease)     GI bleed 03/17/2019    History of echocardiogram 11/19/2008    Normal.    History of nuclear stress test 11/19/2008    EF 66%, No evidence for ischemia.    Hyperlipidemia     Hypertension     Missing tooth, acquired     Pt reports losing a tooth d/t chemotherapy    Personal history of COVID-19 01/2022    mild symptoms, recovered at home    PONV (postoperative nausea and vomiting)     Tumor     Urethral    Wears glasses      Past Surgical History:   Procedure Laterality Date    APPENDECTOMY      BLADDER AUGMENTATION      CHOLECYSTECTOMY      COLONOSCOPY      CYSTOSCOPY  12/28/2020    CYSTOSCOPY N/A 11/28/2022    Procedure: CYSTOSCOPY, excision of vaginal/periurethral mass transvaginally;  Surgeon: Quintin Greene MD;  Location: [facility];  Service: Urology    FL CYSTOGRAM  12/19/2022    HERNIA REPAIR Right     inguinal    HYSTERECTOMY      IR AORTAGRAM WITH RUN-OFF  06/14/2019    KNEE ARTHROSCOPY Bilateral     NV COLONOSCOPY FLX DX W/COLLJ SPEC WHEN PFRMD N/A 01/23/2018    Procedure: COLONOSCOPY;  Surgeon: Samira Goncalves DO;  Location: [facility];  Service: Gastroenterology    NV SLCTV CATHJ 3RD+ ORD SLCTV ABDL PEL/LXTR BRNCH Left 06/14/2019    Procedure: ARTERIOGRAM-arteriography and possible endovascular intervention.;  Surgeon: Lee Cantu MD;  [facility];  Service: Vascular    PUBOVAGINAL SLING N/A 03/17/2021    Procedure: Open transvaginal urethral biopsy;  Surgeon: Quintin Greene MD;  Location:  MAIN OR;  Service: Urology    REDUCTION MAMMAPLASTY      SHOULDER SURGERY Left     WISDOM TOOTH EXTRACTION      WISDOM TOOTH EXTRACTION       Social History     Socioeconomic History    Marital status: /Civil Union     Spouse name: Not on file    Number of children: Not on file    Years of education: Not on file    Highest education level: Not on file   Occupational  "History    Not on file   Tobacco Use    Smoking status: Every Day     Current packs/day: 0.25     Average packs/day: 0.3 packs/day for 35.0 years (8.8 ttl pk-yrs)     Types: Cigarettes    Smokeless tobacco: Never   Vaping Use    Vaping status: Never Used   Substance and Sexual Activity    Alcohol use: Not Currently     Alcohol/week: 0.0 standard drinks of alcohol     Comment: N/A    Drug use: No    Sexual activity: Not Currently   Other Topics Concern    Not on file   Social History Narrative    Not on file     Social Drivers of Health     Financial Resource Strain: Not on file   Food Insecurity: Not on file   Transportation Needs: Not on file   Physical Activity: Not on file   Stress: Not on file   Social Connections: Unknown (6/18/2024)    Received from [employer]     How often do you feel lonely or isolated from those around you? (Adult - for ages 18 years and over): Not on file   Intimate Partner Violence: Not on file   Housing Stability: Not on file     Allergies   Allergen Reactions    Chantix [Varenicline] Other (See Comments)     Psychiatric side effects    Clomiphene Hives      Brand name - clomid    Metformin      Lactic acidosis    Wellbutrin [Bupropion] Other (See Comments)     Psychiatric side effects    Latex Blisters       Objective   /70   Pulse 87   Temp 97.5 °F (36.4 °C) (Temporal)   Resp 18   Ht 5' 3\" (1.6 m)   Wt 68.9 kg (152 lb)   SpO2 99%   BMI 26.93 kg/m²      Physical Exam  Vitals and nursing note reviewed.   Constitutional:       General: She is not in acute distress.     Appearance: Normal appearance. She is not ill-appearing, toxic-appearing or diaphoretic.   HENT:      Head: Normocephalic and atraumatic.      Right Ear: External ear normal.      Left Ear: External ear normal.      Nose: Congestion present.      Mouth/Throat:      Mouth: Mucous membranes are dry.   Eyes:      General: No scleral icterus.  Cardiovascular:      Rate and Rhythm: Normal rate " and regular rhythm.      Pulses: Normal pulses.      Heart sounds: Normal heart sounds.   Pulmonary:      Effort: Pulmonary effort is normal. No respiratory distress.      Breath sounds: Normal breath sounds. No wheezing.   Abdominal:      General: Bowel sounds are normal.      Palpations: Abdomen is soft.   Musculoskeletal:      Cervical back: Normal range of motion and neck supple.      Right lower leg: No edema.      Left lower leg: No edema.   Lymphadenopathy:      Cervical: No cervical adenopathy.   Skin:     General: Skin is warm and dry.      Coloration: Skin is not jaundiced or pale.   Neurological:      General: No focal deficit present.      Mental Status: She is alert and oriented to person, place, and time. Mental status is at baseline.      Cranial Nerves: No cranial nerve deficit.      Sensory: No sensory deficit.   Psychiatric:         Mood and Affect: Mood normal.         Behavior: Behavior normal.         Thought Content: Thought content normal.         Judgment: Judgment normal.

## 2025-01-13 ENCOUNTER — TELEPHONE (OUTPATIENT)
Age: 60
End: 2025-01-13

## 2025-01-13 NOTE — TELEPHONE ENCOUNTER
Call received from patient. Asking for results from her blood work and if any adjustments need to be made medication wise based on results.

## 2025-03-18 DIAGNOSIS — E11.9 TYPE 2 DIABETES MELLITUS WITHOUT COMPLICATION, WITHOUT LONG-TERM CURRENT USE OF INSULIN (HCC): ICD-10-CM

## 2025-03-18 DIAGNOSIS — E78.2 MIXED HYPERLIPIDEMIA: ICD-10-CM

## 2025-03-18 DIAGNOSIS — K29.00 ACUTE GASTRITIS WITHOUT HEMORRHAGE, UNSPECIFIED GASTRITIS TYPE: ICD-10-CM

## 2025-03-18 NOTE — TELEPHONE ENCOUNTER
Patient said she missed her last appointment because she was having family issues. She intends on making another appointment when she is able to     Reason for call:   [x] Refill   [] Prior Auth  [] Other:     Office:   [x] PCP/Provider -Mark   [] Specialty/Provider -     Famotidine 40mg  1 tab daily #30    Losartan 25mg  1 tab daily #30    Simvastatin 20mg  1 tab hs #30    Pharmacy: [facility] [address] [place], [place] [address], [address], [address] [phone]     Local Pharmacy   Does the patient have enough for 3 days?   [x] Yes   [] No - Send as HP to POD

## 2025-03-19 RX ORDER — FAMOTIDINE 40 MG/1
40 TABLET, FILM COATED ORAL DAILY
Qty: 90 TABLET | Refills: 1 | Status: SHIPPED | OUTPATIENT
Start: 2025-03-19

## 2025-03-19 RX ORDER — SIMVASTATIN 20 MG
20 TABLET ORAL
Qty: 90 TABLET | Refills: 1 | Status: SHIPPED | OUTPATIENT
Start: 2025-03-19

## 2025-03-19 RX ORDER — LOSARTAN POTASSIUM 25 MG/1
25 TABLET ORAL DAILY
Qty: 90 TABLET | Refills: 1 | Status: SHIPPED | OUTPATIENT
Start: 2025-03-19

## 2025-03-26 ENCOUNTER — TELEPHONE (OUTPATIENT)
Dept: HEMATOLOGY ONCOLOGY | Facility: CLINIC | Age: 60
End: 2025-03-26

## 2025-03-27 DIAGNOSIS — Z29.89 NEED FOR PROPHYLAXIS AGAINST URINARY TRACT INFECTION: ICD-10-CM

## 2025-03-27 DIAGNOSIS — D45 POLYCYTHEMIA VERA (HCC): ICD-10-CM

## 2025-03-28 RX ORDER — HYDROXYUREA 500 MG/1
CAPSULE ORAL
Qty: 45 CAPSULE | Refills: 0 | Status: SHIPPED | OUTPATIENT
Start: 2025-03-28

## 2025-03-28 RX ORDER — NITROFURANTOIN MACROCRYSTALS 50 MG/1
50 CAPSULE ORAL DAILY
Qty: 30 CAPSULE | Refills: 0 | Status: SHIPPED | OUTPATIENT
Start: 2025-03-28

## 2025-04-15 DIAGNOSIS — E11.65 TYPE 2 DIABETES MELLITUS WITH HYPERGLYCEMIA, WITHOUT LONG-TERM CURRENT USE OF INSULIN (HCC): ICD-10-CM

## 2025-04-16 RX ORDER — EMPAGLIFLOZIN 10 MG/1
10 TABLET, FILM COATED ORAL EVERY MORNING
Qty: 90 TABLET | Refills: 3 | Status: SHIPPED | OUTPATIENT
Start: 2025-04-16

## 2025-04-24 DIAGNOSIS — K29.00 ACUTE GASTRITIS WITHOUT HEMORRHAGE, UNSPECIFIED GASTRITIS TYPE: ICD-10-CM

## 2025-04-25 RX ORDER — FAMOTIDINE 40 MG/1
40 TABLET, FILM COATED ORAL DAILY
Qty: 90 TABLET | Refills: 0 | Status: SHIPPED | OUTPATIENT
Start: 2025-04-25

## 2025-05-05 ENCOUNTER — APPOINTMENT (OUTPATIENT)
Dept: LAB | Facility: MEDICAL CENTER | Age: 60
End: 2025-05-05
Attending: NURSE PRACTITIONER
Payer: COMMERCIAL

## 2025-05-05 DIAGNOSIS — D45 POLYCYTHEMIA VERA (HCC): ICD-10-CM

## 2025-05-05 DIAGNOSIS — Z29.89 NEED FOR PROPHYLAXIS AGAINST URINARY TRACT INFECTION: ICD-10-CM

## 2025-05-05 DIAGNOSIS — E11.65 TYPE 2 DIABETES MELLITUS WITH HYPERGLYCEMIA, WITHOUT LONG-TERM CURRENT USE OF INSULIN (HCC): ICD-10-CM

## 2025-05-05 DIAGNOSIS — R71.8 ELEVATED MCV: ICD-10-CM

## 2025-05-05 LAB
ALBUMIN SERPL BCG-MCNC: 3.5 G/DL (ref 3.5–5)
ALP SERPL-CCNC: 84 U/L (ref 34–104)
ALT SERPL W P-5'-P-CCNC: 8 U/L (ref 7–52)
ANION GAP SERPL CALCULATED.3IONS-SCNC: 7 MMOL/L (ref 4–13)
AST SERPL W P-5'-P-CCNC: 11 U/L (ref 13–39)
BASOPHILS # BLD AUTO: 0.06 THOUSANDS/ÂΜL (ref 0–0.1)
BASOPHILS NFR BLD AUTO: 1 % (ref 0–1)
BILIRUB SERPL-MCNC: 0.26 MG/DL (ref 0.2–1)
BUN SERPL-MCNC: 19 MG/DL (ref 5–25)
CALCIUM SERPL-MCNC: 8.7 MG/DL (ref 8.4–10.2)
CHLORIDE SERPL-SCNC: 106 MMOL/L (ref 96–108)
CO2 SERPL-SCNC: 26 MMOL/L (ref 21–32)
CREAT SERPL-MCNC: 0.76 MG/DL (ref 0.6–1.3)
EOSINOPHIL # BLD AUTO: 0.18 THOUSAND/ÂΜL (ref 0–0.61)
EOSINOPHIL NFR BLD AUTO: 2 % (ref 0–6)
ERYTHROCYTE [DISTWIDTH] IN BLOOD BY AUTOMATED COUNT: 12.2 % (ref 11.6–15.1)
EST. AVERAGE GLUCOSE BLD GHB EST-MCNC: 146 MG/DL
GFR SERPL CREATININE-BSD FRML MDRD: 86 ML/MIN/1.73SQ M
GLUCOSE P FAST SERPL-MCNC: 154 MG/DL (ref 65–99)
HBA1C MFR BLD: 6.7 %
HCT VFR BLD AUTO: 39.2 % (ref 34.8–46.1)
HGB BLD-MCNC: 13.1 G/DL (ref 11.5–15.4)
IMM GRANULOCYTES # BLD AUTO: 0.09 THOUSAND/UL (ref 0–0.2)
IMM GRANULOCYTES NFR BLD AUTO: 1 % (ref 0–2)
LYMPHOCYTES # BLD AUTO: 2.21 THOUSANDS/ÂΜL (ref 0.6–4.47)
LYMPHOCYTES NFR BLD AUTO: 20 % (ref 14–44)
MCH RBC QN AUTO: 39.6 PG (ref 26.8–34.3)
MCHC RBC AUTO-ENTMCNC: 33.4 G/DL (ref 31.4–37.4)
MCV RBC AUTO: 118 FL (ref 82–98)
MONOCYTES # BLD AUTO: 0.43 THOUSAND/ÂΜL (ref 0.17–1.22)
MONOCYTES NFR BLD AUTO: 4 % (ref 4–12)
NEUTROPHILS # BLD AUTO: 8.3 THOUSANDS/ÂΜL (ref 1.85–7.62)
NEUTS SEG NFR BLD AUTO: 72 % (ref 43–75)
NRBC BLD AUTO-RTO: 0 /100 WBCS
PLATELET # BLD AUTO: 358 THOUSANDS/UL (ref 149–390)
PMV BLD AUTO: 9.6 FL (ref 8.9–12.7)
POTASSIUM SERPL-SCNC: 3.8 MMOL/L (ref 3.5–5.3)
PROT SERPL-MCNC: 6.6 G/DL (ref 6.4–8.4)
RBC # BLD AUTO: 3.31 MILLION/UL (ref 3.81–5.12)
SODIUM SERPL-SCNC: 139 MMOL/L (ref 135–147)
WBC # BLD AUTO: 11.27 THOUSAND/UL (ref 4.31–10.16)

## 2025-05-05 PROCEDURE — 83036 HEMOGLOBIN GLYCOSYLATED A1C: CPT

## 2025-05-05 PROCEDURE — 85025 COMPLETE CBC W/AUTO DIFF WBC: CPT

## 2025-05-05 PROCEDURE — 36415 COLL VENOUS BLD VENIPUNCTURE: CPT

## 2025-05-05 PROCEDURE — 80053 COMPREHEN METABOLIC PANEL: CPT

## 2025-05-07 ENCOUNTER — RESULTS FOLLOW-UP (OUTPATIENT)
Dept: HEMATOLOGY ONCOLOGY | Facility: CLINIC | Age: 60
End: 2025-05-07

## 2025-05-09 DIAGNOSIS — D45 POLYCYTHEMIA VERA (HCC): ICD-10-CM

## 2025-05-09 DIAGNOSIS — Z29.89 NEED FOR PROPHYLAXIS AGAINST URINARY TRACT INFECTION: ICD-10-CM

## 2025-05-09 RX ORDER — NITROFURANTOIN MACROCRYSTALS 50 MG/1
50 CAPSULE ORAL DAILY
Qty: 30 CAPSULE | Refills: 0 | Status: SHIPPED | OUTPATIENT
Start: 2025-05-09 | End: 2025-05-13 | Stop reason: SDUPTHER

## 2025-05-09 RX ORDER — HYDROXYUREA 500 MG/1
CAPSULE ORAL
Qty: 45 CAPSULE | Refills: 0 | Status: SHIPPED | OUTPATIENT
Start: 2025-05-09 | End: 2025-05-13 | Stop reason: SDUPTHER

## 2025-05-12 ENCOUNTER — OFFICE VISIT (OUTPATIENT)
Dept: FAMILY MEDICINE CLINIC | Facility: CLINIC | Age: 60
End: 2025-05-12
Payer: COMMERCIAL

## 2025-05-12 VITALS
WEIGHT: 153 LBS | TEMPERATURE: 97.7 F | HEART RATE: 90 BPM | OXYGEN SATURATION: 97 % | BODY MASS INDEX: 27.11 KG/M2 | DIASTOLIC BLOOD PRESSURE: 74 MMHG | SYSTOLIC BLOOD PRESSURE: 136 MMHG | HEIGHT: 63 IN | RESPIRATION RATE: 18 BRPM

## 2025-05-12 DIAGNOSIS — Z72.0 TOBACCO USE: ICD-10-CM

## 2025-05-12 DIAGNOSIS — I10 ESSENTIAL HYPERTENSION: ICD-10-CM

## 2025-05-12 DIAGNOSIS — J01.00 SUBACUTE MAXILLARY SINUSITIS: Primary | ICD-10-CM

## 2025-05-12 DIAGNOSIS — Z12.31 ENCOUNTER FOR SCREENING MAMMOGRAM FOR MALIGNANT NEOPLASM OF BREAST: ICD-10-CM

## 2025-05-12 DIAGNOSIS — E11.65 TYPE 2 DIABETES MELLITUS WITH HYPERGLYCEMIA, WITHOUT LONG-TERM CURRENT USE OF INSULIN (HCC): ICD-10-CM

## 2025-05-12 PROCEDURE — 99214 OFFICE O/P EST MOD 30 MIN: CPT | Performed by: INTERNAL MEDICINE

## 2025-05-12 RX ORDER — DEXTROMETHORPHAN HYDROBROMIDE AND PROMETHAZINE HYDROCHLORIDE 15; 6.25 MG/5ML; MG/5ML
5 SYRUP ORAL 4 TIMES DAILY PRN
Qty: 240 ML | Refills: 1 | Status: SHIPPED | OUTPATIENT
Start: 2025-05-12

## 2025-05-12 NOTE — PROGRESS NOTES
"The patient is given the patient education document:  ""Tears"" Name: Mary Kay      : 1965      MRN: [id number]  Encounter Provider: JUANITA Kent  Encounter Date: 2025   Encounter department: [facility] [place]  :  Assessment & Plan        No follow-ups on file.    History of Present Illness {?Quick Links Encounters * My Last Note * Last Note in Specialty * Snapshot * Since Last Visit * History :22262}  No chief complaint on file.    Pertinent Medical History   {There is no content from the last Pertinent Medical History section.}  25: ***     Review of Systems  {Select to insert medical history sections (Optional):44554}      Objective {?Quick Links Trend Vitals * Enter New Vitals * Results Review * Timeline (Adult) * Labs * Imaging * Cardiology * Procedures * Lung Cancer Screening * Surgical eConsent :42787}  There were no vitals taken for this visit.    Physical Exam    Labs: I have reviewed the following labs:  Results for orders placed or performed in visit on 25   CBC and differential   Result Value Ref Range    WBC 11.27 (H) 4.31 - 10.16 Thousand/uL    RBC 3.31 (L) 3.81 - 5.12 Million/uL    Hemoglobin 13.1 11.5 - 15.4 g/dL    Hematocrit 39.2 34.8 - 46.1 %     (H) 82 - 98 fL    MCH 39.6 (H) 26.8 - 34.3 pg    MCHC 33.4 31.4 - 37.4 g/dL    RDW 12.2 11.6 - 15.1 %    MPV 9.6 8.9 - 12.7 fL    Platelets 358 149 - 390 Thousands/uL    nRBC 0 /100 WBCs    Segmented % 72 43 - 75 %    Immature Grans % 1 0 - 2 %    Lymphocytes % 20 14 - 44 %    Monocytes % 4 4 - 12 %    Eosinophils Relative 2 0 - 6 %    Basophils Relative 1 0 - 1 %    Absolute Neutrophils 8.30 (H) 1.85 - 7.62 Thousands/µL    Absolute Immature Grans 0.09 0.00 - 0.20 Thousand/uL    Absolute Lymphocytes 2.21 0.60 - 4.47 Thousands/µL    Absolute Monocytes 0.43 0.17 - 1.22 Thousand/µL    Eosinophils Absolute 0.18 0.00 - 0.61 Thousand/µL    Basophils Absolute 0.06 0.00 - 0.10 Thousands/µL   Comprehensive metabolic panel   Result Value Ref Range     Sodium 139 135 - 147 mmol/L    Potassium 3.8 3.5 - 5.3 mmol/L    Chloride 106 96 - 108 mmol/L    CO2 26 21 - 32 mmol/L    ANION GAP 7 4 - 13 mmol/L    BUN 19 5 - 25 mg/dL    Creatinine 0.76 0.60 - 1.30 mg/dL    Glucose, Fasting 154 (H) 65 - 99 mg/dL    Calcium 8.7 8.4 - 10.2 mg/dL    AST 11 (L) 13 - 39 U/L    ALT 8 7 - 52 U/L    Alkaline Phosphatase 84 34 - 104 U/L    Total Protein 6.6 6.4 - 8.4 g/dL    Albumin 3.5 3.5 - 5.0 g/dL    Total Bilirubin 0.26 0.20 - 1.00 mg/dL    eGFR 86 ml/min/1.73sq m   Hemoglobin A1C   Result Value Ref Range    Hemoglobin A1C 6.7 (H) Normal 4.0-5.6%; PreDiabetic 5.7-6.4%; Diabetic >=6.5%; Glycemic control for adults with diabetes <7.0% %     mg/dl     *Note: Due to a large number of results and/or encounters for the requested time period, some results have not been displayed. A complete set of results can be found in Results Review.   {SL AMB ONCOLOGY LABS (Optional):51376}    {Radiology Results Review (Optional):88449}    {Administrative / Billing Section (Optional):73294}

## 2025-05-12 NOTE — ASSESSMENT & PLAN NOTE
Lab Results   Component Value Date    HGBA1C 6.7 (H) 05/05/2025   Lo carb diet Monitor BS     Orders:    Albumin / creatinine urine ratio; Future    Comprehensive metabolic panel; Future    Hemoglobin A1C; Future  Labs before next visit

## 2025-05-12 NOTE — PROGRESS NOTES
Name: Mary Kay      : 1965      MRN: [id number]  Encounter Provider: Velma Flores DO  Encounter Date: 2025   Encounter department: [facility]  :  Assessment & Plan  Encounter for screening mammogram for malignant neoplasm of breast    Orders:    Mammo screening bilateral w 3d and cad; Future  Schedule Mammogram   Type 2 diabetes mellitus with hyperglycemia, without long-term current use of insulin (HCC)    Lab Results   Component Value Date    HGBA1C 6.7 (H) 2025   Lo carb diet Monitor BS     Orders:    Albumin / creatinine urine ratio; Future    Comprehensive metabolic panel; Future    Hemoglobin A1C; Future  Labs before next visit   Essential hypertension  Lo sodium diet Stop smoking Continue current rx        Tobacco use  Stop smoking encouraged        Subacute maxillary sinusitis    Orders:    amoxicillin-clavulanate (AUGMENTIN) 875-125 mg per tablet; Take 1 tablet by mouth 2 (two) times a day for 7 days    promethazine-dextromethorphan (PHENERGAN-DM) 6.25-15 mg/5 mL oral syrup; Take 5 mL by mouth 4 (four) times a day as needed for cough  Increase fluid intake rest Take antibx as prescribed   Rto 4months/annual with labs       Tobacco Cessation Counseling: Tobacco cessation counseling was provided. The patient is sincerely urged to quit consumption of tobacco. She is not ready to quit tobacco.       History of Present Illness   HPI  Pt has sinus pressure, congestion, pnd and cough with colored mucous She smokes but not as much No chest pain No fever Some mild anton Has frontal headaches Using antihistamine and nasal spray for weeks daily Stays hydrated   Review of Systems   Constitutional:  Negative for chills and fever.   HENT:  Positive for congestion and postnasal drip.    Eyes:  Negative for visual disturbance.   Respiratory:  Positive for cough. Negative for shortness of breath.    Cardiovascular:  Negative for chest pain, palpitations and leg swelling.    Gastrointestinal: Negative.    Genitourinary: Negative.    Musculoskeletal:  Positive for myalgias.   Neurological:  Negative for dizziness, light-headedness and headaches.   Psychiatric/Behavioral:  Negative for sleep disturbance. The patient is not nervous/anxious.      Past Medical History:   Diagnosis Date    Arthritis     Cancer (HCC)      chronic leukemia    Chronic kidney disease     cyst in left kidney.    Diabetes mellitus (HCC)     Frequent sinus infections     Pt states she has a mass in left side of sinus    GERD (gastroesophageal reflux disease)     GI bleed 03/17/2019    History of echocardiogram 11/19/2008    Normal.    History of nuclear stress test 11/19/2008    EF 66%, No evidence for ischemia.    Hyperlipidemia     Hypertension     Missing tooth, acquired     Pt reports losing a tooth d/t chemotherapy    Personal history of COVID-19 01/2022    mild symptoms, recovered at home    PONV (postoperative nausea and vomiting)     Tumor     Urethral    Wears glasses      Past Surgical History:   Procedure Laterality Date    APPENDECTOMY      BLADDER AUGMENTATION      CHOLECYSTECTOMY      COLONOSCOPY      CYSTOSCOPY  12/28/2020    CYSTOSCOPY N/A 11/28/2022    Procedure: CYSTOSCOPY, excision of vaginal/periurethral mass transvaginally;  Surgeon: Quintin Greene MD;  Location: [facility];  Service: Urology    FL CYSTOGRAM  12/19/2022    HERNIA REPAIR Right     inguinal    HYSTERECTOMY      IR AORTAGRAM WITH RUN-OFF  06/14/2019    KNEE ARTHROSCOPY Bilateral     MA COLONOSCOPY FLX DX W/COLLJ SPEC WHEN PFRMD N/A 01/23/2018    Procedure: COLONOSCOPY;  Surgeon: Samira Goncalves DO;  Location: [facility];  Service: Gastroenterology    MA SLCTV CATHJ 3RD+ ORD SLCTV ABDL PEL/LXTR BRNCH Left 06/14/2019    Procedure: ARTERIOGRAM-arteriography and possible endovascular intervention.;  Surgeon: Lee Cantu MD;  Location:  MAIN OR;  Service: Vascular    PUBOVAGINAL SLING N/A 03/17/2021    Procedure: Open transvaginal  "urethral biopsy;  Surgeon: Quintin Greene MD;  Location:  MAIN OR;  Service: Urology    REDUCTION MAMMAPLASTY      SHOULDER SURGERY Left     WISDOM TOOTH EXTRACTION      WISDOM TOOTH EXTRACTION       Social History     Socioeconomic History    Marital status: /Civil Union     Spouse name: Not on file    Number of children: Not on file    Years of education: Not on file    Highest education level: Not on file   Occupational History    Not on file   Tobacco Use    Smoking status: Every Day     Current packs/day: 0.25     Average packs/day: 0.3 packs/day for 35.0 years (8.8 ttl pk-yrs)     Types: Cigarettes    Smokeless tobacco: Never   Vaping Use    Vaping status: Never Used   Substance and Sexual Activity    Alcohol use: Not Currently     Alcohol/week: 0.0 standard drinks of alcohol     Comment: N/A    Drug use: No    Sexual activity: Not Currently   Other Topics Concern    Not on file   Social History Narrative    Not on file     Social Drivers of Health     Financial Resource Strain: Not on file   Food Insecurity: Not on file   Transportation Needs: Not on file   Physical Activity: Not on file   Stress: Not on file   Social Connections: Unknown (6/18/2024)    Received from [employer]     How often do you feel lonely or isolated from those around you? (Adult - for ages 18 years and over): Not on file   Intimate Partner Violence: Not on file   Housing Stability: Not on file     Allergies   Allergen Reactions    Chantix [Varenicline] Other (See Comments)     Psychiatric side effects    Clomiphene Hives      Brand name - clomid    Metformin      Lactic acidosis    Wellbutrin [Bupropion] Other (See Comments)     Psychiatric side effects    Latex Blisters       Objective   /74   Pulse 90   Temp 97.7 °F (36.5 °C) (Temporal)   Resp 18   Ht 5' 3\" (1.6 m)   Wt 69.4 kg (153 lb)   SpO2 97%   BMI 27.10 kg/m²      Physical Exam  Vitals and nursing note reviewed.   Constitutional:       " General: She is not in acute distress.     Appearance: Normal appearance. She is not ill-appearing, toxic-appearing or diaphoretic.   HENT:      Head: Normocephalic and atraumatic.      Right Ear: External ear normal.      Left Ear: External ear normal.      Nose: Nose normal.      Mouth/Throat:      Mouth: Mucous membranes are moist.      Pharynx: Oropharyngeal exudate present. No posterior oropharyngeal erythema.   Eyes:      General: No scleral icterus.  Cardiovascular:      Rate and Rhythm: Normal rate and regular rhythm.      Pulses: Normal pulses.      Heart sounds: Normal heart sounds.   Pulmonary:      Effort: Pulmonary effort is normal. No respiratory distress.      Breath sounds: Normal breath sounds. No wheezing.   Abdominal:      General: Bowel sounds are normal. There is no distension.      Palpations: Abdomen is soft.      Tenderness: There is no abdominal tenderness.   Musculoskeletal:      Cervical back: Normal range of motion and neck supple.      Right lower leg: No edema.      Left lower leg: No edema.   Lymphadenopathy:      Cervical: No cervical adenopathy.   Skin:     General: Skin is warm and dry.      Coloration: Skin is not jaundiced or pale.   Neurological:      General: No focal deficit present.      Mental Status: She is alert and oriented to person, place, and time. Mental status is at baseline.      Cranial Nerves: No cranial nerve deficit.   Psychiatric:         Mood and Affect: Mood normal.         Behavior: Behavior normal.         Thought Content: Thought content normal.         Judgment: Judgment normal.

## 2025-05-12 NOTE — ASSESSMENT & PLAN NOTE
Orders:    amoxicillin-clavulanate (AUGMENTIN) 875-125 mg per tablet; Take 1 tablet by mouth 2 (two) times a day for 7 days    promethazine-dextromethorphan (PHENERGAN-DM) 6.25-15 mg/5 mL oral syrup; Take 5 mL by mouth 4 (four) times a day as needed for cough  Increase fluid intake rest Take antibx as prescribed

## 2025-05-13 ENCOUNTER — TELEMEDICINE (OUTPATIENT)
Dept: HEMATOLOGY ONCOLOGY | Facility: CLINIC | Age: 60
End: 2025-05-13

## 2025-05-13 DIAGNOSIS — Z29.89 NEED FOR PROPHYLAXIS AGAINST URINARY TRACT INFECTION: ICD-10-CM

## 2025-05-13 DIAGNOSIS — D45 POLYCYTHEMIA VERA (HCC): Primary | ICD-10-CM

## 2025-05-13 PROCEDURE — NC001 PR NO CHARGE: Performed by: NURSE PRACTITIONER

## 2025-05-13 RX ORDER — NITROFURANTOIN MACROCRYSTALS 50 MG/1
50 CAPSULE ORAL DAILY
Qty: 30 CAPSULE | Refills: 11 | Status: SHIPPED | OUTPATIENT
Start: 2025-05-13

## 2025-05-13 RX ORDER — HYDROXYUREA 500 MG/1
CAPSULE ORAL
Qty: 45 CAPSULE | Refills: 11 | Status: SHIPPED | OUTPATIENT
Start: 2025-05-13

## 2025-05-13 NOTE — ASSESSMENT & PLAN NOTE
Labs from 5/5/2025 show a WBC of 11.27, RBC 3.31, hemoglobin 13.1 hematocrit 39.2 , MCH 39.6, absolute neutrophils 8.3 otherwise normal differential.  Macrocytosis secondary to Hydrea.  Patient reports a current sinus infection and was started on antibiotics by her PCP.  CMP shows a glucose of 154, AST 11 otherwise normal.  Patient is alternating doses, 500 mg on days and 1000 mg on even days.  Patient is not up-to-date on mammogram and is encouraged to have one done.  Colonoscopy was done in 2018.  Will monitor labs every 3 months and follow-up in 6 months.  Patient knows to contact us through [employer] if she has any questions or concerns.    Orders:    hydroxyurea (HYDREA) 500 mg capsule; TAKE 1 CAPSULE BY MOUTH ON ODD NUMBERED DAYS AND 2 CAPSULES BY MOUTH ON EVEN NUMBERED DAYS    nitrofurantoin (MACRODANTIN) 50 mg capsule; Take 1 capsule (50 mg total) by mouth daily    CBC and differential; Standing    Comprehensive metabolic panel; Standing

## 2025-05-13 NOTE — LETTER
May 13, 2025     Velma Flores DO  [address] [facility] [address]    Patient: Mary Kay   YOB: 1965   Date of Visit: 2025       Dear Dr. Velma Flores DO:    Thank you for referring Mary Kay to me for evaluation. Below are my notes for this consultation.    If you have questions, please do not hesitate to call me. I look forward to following your patient along with you.         Sincerely,        JUANITA Kent        CC: No Recipients    JUANITA Kent  2025  3:37 PM  Sign when Signing Visit  Virtual Brief Visit  Name: Mary Kay      : 1965      MRN: [id number]  Encounter Provider: JUANITA Kent  Encounter Date: 2025   Encounter department: [facility] [place]  :  Assessment & Plan  Polycythemia vera (HCC)   Labs from 2025 show a WBC of 11.27, RBC 3.31, hemoglobin 13.1 hematocrit 39.2 , MCH 39.6, absolute neutrophils 8.3 otherwise normal differential.  Macrocytosis secondary to Hydrea.  Patient reports a current sinus infection and was started on antibiotics by her PCP.  CMP shows a glucose of 154, AST 11 otherwise normal.  Patient is alternating doses, 500 mg on days and 1000 mg on even days.  Patient is not up-to-date on mammogram and is encouraged to have one done.  Colonoscopy was done in 2018.  Will monitor labs every 3 months and follow-up in 6 months.  Patient knows to contact us through [employer] if she has any questions or concerns.    Orders:  •  hydroxyurea (HYDREA) 500 mg capsule; TAKE 1 CAPSULE BY MOUTH ON ODD NUMBERED DAYS AND 2 CAPSULES BY MOUTH ON EVEN NUMBERED DAYS  •  nitrofurantoin (MACRODANTIN) 50 mg capsule; Take 1 capsule (50 mg total) by mouth daily  •  CBC and differential; Standing  •  Comprehensive metabolic panel; Standing    Need for prophylaxis against urinary tract infection   No current symptoms of UTI.  Orders:  •  nitrofurantoin (MACRODANTIN) 50 mg capsule; Take  1 capsule (50 mg total) by mouth daily  •  CBC and differential; Standing  •  Comprehensive metabolic panel; Standing        History of Present Illness   Patient is a 59-year-old female with a history of JAK2 negative polycythemia vera. She is currently on Hydrea 500 mg alternating with 1000 mg every other day.     HPI    Administrative Statements  Encounter provider JUANITA Kent    The Patient is located at Home and in the following state in which I hold an active license [place].    The patient was identified by name and date of birth. Mary LANG Eliza was informed that this is a telemedicine visit and that the visit is being conducted through Telephone.  My office door was closed. No one else was in the room.  She acknowledged consent and understanding of privacy and security of the video platform. The patient has agreed to participate and understands they can discontinue the visit at any time.    I have spent a total time of 12 minutes in caring for this patient on the day of the visit/encounter including Diagnostic results, Risks and benefits of tx options, Instructions for management, Documenting in the medical record, and Reviewing/placing orders in the medical record (including tests, medications, and/or procedures), not including the time spent for establishing the audio/video connection.

## 2025-05-13 NOTE — PROGRESS NOTES
Virtual Brief Visit  Name: Mary Kay      : 1965      MRN: [id number]  Encounter Provider: JUANITA Kent  Encounter Date: 2025   Encounter department: [facility] [place]  :  Assessment & Plan  Polycythemia vera (HCC)   Labs from 2025 show a WBC of 11.27, RBC 3.31, hemoglobin 13.1 hematocrit 39.2 , MCH 39.6, absolute neutrophils 8.3 otherwise normal differential.  Macrocytosis secondary to Hydrea.  Patient reports a current sinus infection and was started on antibiotics by her PCP.  CMP shows a glucose of 154, AST 11 otherwise normal.  Patient is alternating doses, 500 mg on days and 1000 mg on even days.  Patient is not up-to-date on mammogram and is encouraged to have one done.  Colonoscopy was done in 2018.  Will monitor labs every 3 months and follow-up in 6 months.  Patient knows to contact us through [employer] if she has any questions or concerns.    Orders:    hydroxyurea (HYDREA) 500 mg capsule; TAKE 1 CAPSULE BY MOUTH ON ODD NUMBERED DAYS AND 2 CAPSULES BY MOUTH ON EVEN NUMBERED DAYS    nitrofurantoin (MACRODANTIN) 50 mg capsule; Take 1 capsule (50 mg total) by mouth daily    CBC and differential; Standing    Comprehensive metabolic panel; Standing    Need for prophylaxis against urinary tract infection   No current symptoms of UTI.  Orders:    nitrofurantoin (MACRODANTIN) 50 mg capsule; Take 1 capsule (50 mg total) by mouth daily    CBC and differential; Standing    Comprehensive metabolic panel; Standing        History of Present Illness    Patient is a 59-year-old female with a history of JAK2 negative polycythemia vera. She is currently on Hydrea 500 mg alternating with 1000 mg every other day.     HPI    Administrative Statements   Encounter provider JUANITA Kent    The Patient is located at Home and in the following state in which I hold an active license [place].    The patient was identified by name and date of birth. Mary Kay  was informed that this is a telemedicine visit and that the visit is being conducted through Telephone.  My office door was closed. No one else was in the room.  She acknowledged consent and understanding of privacy and security of the video platform. The patient has agreed to participate and understands they can discontinue the visit at any time.    I have spent a total time of 12 minutes in caring for this patient on the day of the visit/encounter including Diagnostic results, Risks and benefits of tx options, Instructions for management, Documenting in the medical record, and Reviewing/placing orders in the medical record (including tests, medications, and/or procedures), not including the time spent for establishing the audio/video connection.

## 2025-06-05 ENCOUNTER — HOSPITAL ENCOUNTER (OUTPATIENT)
Dept: MAMMOGRAPHY | Facility: HOSPITAL | Age: 60
Discharge: HOME | End: 2025-06-05
Attending: INTERNAL MEDICINE
Payer: COMMERCIAL

## 2025-06-05 VITALS — HEIGHT: 63 IN | WEIGHT: 153 LBS | BODY MASS INDEX: 27.11 KG/M2

## 2025-06-05 DIAGNOSIS — Z12.31 ENCOUNTER FOR SCREENING MAMMOGRAM FOR MALIGNANT NEOPLASM OF BREAST: ICD-10-CM

## 2025-06-05 PROCEDURE — 77067 SCR MAMMO BI INCL CAD: CPT

## 2025-06-05 PROCEDURE — 77063 BREAST TOMOSYNTHESIS BI: CPT

## 2025-06-10 ENCOUNTER — RESULTS FOLLOW-UP (OUTPATIENT)
Dept: FAMILY MEDICINE CLINIC | Facility: CLINIC | Age: 60
End: 2025-06-10

## 2025-06-11 PROBLEM — J01.00 SUBACUTE MAXILLARY SINUSITIS: Status: RESOLVED | Noted: 2025-05-12 | Resolved: 2025-06-11

## 2025-07-10 ENCOUNTER — HOSPITAL ENCOUNTER (EMERGENCY)
Facility: HOSPITAL | Age: 60
Discharge: HOME | End: 2025-07-10
Attending: EMERGENCY MEDICINE
Payer: COMMERCIAL

## 2025-07-10 ENCOUNTER — APPOINTMENT (EMERGENCY)
Dept: RADIOLOGY | Facility: HOSPITAL | Age: 60
End: 2025-07-10
Attending: EMERGENCY MEDICINE
Payer: COMMERCIAL

## 2025-07-10 VITALS
SYSTOLIC BLOOD PRESSURE: 159 MMHG | WEIGHT: 140 LBS | DIASTOLIC BLOOD PRESSURE: 73 MMHG | HEART RATE: 68 BPM | HEIGHT: 63 IN | TEMPERATURE: 97 F | BODY MASS INDEX: 24.8 KG/M2 | OXYGEN SATURATION: 100 % | RESPIRATION RATE: 18 BRPM

## 2025-07-10 DIAGNOSIS — M79.89 HAND SWELLING: Primary | ICD-10-CM

## 2025-07-10 PROCEDURE — 99283 EMERGENCY DEPT VISIT LOW MDM: CPT

## 2025-07-10 PROCEDURE — 73130 X-RAY EXAM OF HAND: CPT

## 2025-07-10 PROCEDURE — 96372 THER/PROPH/DIAG INJ SC/IM: CPT

## 2025-07-10 PROCEDURE — 73110 X-RAY EXAM OF WRIST: CPT

## 2025-07-10 PROCEDURE — 99284 EMERGENCY DEPT VISIT MOD MDM: CPT | Performed by: EMERGENCY MEDICINE

## 2025-07-10 RX ORDER — KETOROLAC TROMETHAMINE 30 MG/ML
15 INJECTION, SOLUTION INTRAMUSCULAR; INTRAVENOUS ONCE
Status: COMPLETED | OUTPATIENT
Start: 2025-07-10 | End: 2025-07-10

## 2025-07-10 RX ADMIN — KETOROLAC TROMETHAMINE 15 MG: 30 INJECTION, SOLUTION INTRAMUSCULAR at 13:41

## 2025-07-10 NOTE — ED NOTES
Pt verbalized understanding of discharge instructions as given by treating provider; ambulated out of the ED with daughter without assistance; vital signs stable at discharge; uneventful ED visit     Ping Leonard RN  07/10/25 [id number]

## 2025-07-10 NOTE — ED TRIAGE NOTES
Ambulatory w/complaint of left hand swelling; states hand got crushed between her , who fell, and computer desk; injury occurred Monday; swelling & bruising noted to posterior of right hand; taking ibuprofen, w/last dose of 600 mg @0300 today, and utilizing ice w/no relief

## 2025-07-10 NOTE — ED PROVIDER NOTES
Time reflects when diagnosis was documented in both MDM as applicable and the Disposition within this note       Time User Action Codes Description Comment    7/10/2025  2:33 PM [place] Add [M79.89] Hand swelling           ED Disposition       ED Disposition   Discharge    Condition   Stable    Date/Time   Thu Jul 10, 2025  2:33 PM    Comment   Mary A Eliza discharge to home/self care.                   Assessment & Plan       Medical Decision Making  Amount and/or Complexity of Data Reviewed  Radiology: ordered.    Risk  Prescription drug management.      59-year-old female presenting for evaluation of left hand pain.   Patient had injury to the hand a few days ago, now with worsening pain and swelling.  Will obtain x-ray to evaluate for fracture.  Will treat with Toradol.  Reviewed and interpreted x-ray, shows possible avulsion fracture to the distal third metacarpal although appears very well-circumscribed, but is in the exact location she is having pain.  Will place in splint.  Will have patient follow-up with orthopedics.  Discussed with patient strict return precautions.  Patient expressed understanding was agreeable for discharge.         Medications   ketorolac (TORADOL) injection 15 mg (15 mg Intramuscular Given 7/10/25 1341)       ED Risk Strat Scores                    No data recorded        SBIRT 20yo+      Flowsheet Row Most Recent Value   Initial Alcohol Screen: US AUDIT-C     1. How often do you have a drink containing alcohol? 0 Filed at: 07/10/2025 1318   2. How many drinks containing alcohol do you have on a typical day you are drinking?  0 Filed at: 07/10/2025 1318   3b. FEMALE Any Age, or MALE 65+: How often do you have 4 or more drinks on one occassion? 0 Filed at: 07/10/2025 1318   Audit-C Score 0 Filed at: 07/10/2025 1318   MARINA: How many times in the past year have you...    Used an illegal drug or used a prescription medication for non-medical reasons? Never Filed at: 07/10/2025  1318                            History of Present Illness       Chief Complaint   Patient presents with    Hand Swelling     Ambulatory w/complaint of left hand swelling; states hand got crushed between her , who fell, and computer desk; injury occurred Monday; swelling & bruising noted to posterior of right hand; taking ibuprofen, w/last dose of 600 mg @0300 today, and utilizing ice w/no relief       Past Medical History[1]   Past Surgical History[2]   Family History[3]   Social History[4]   E-Cigarette/Vaping    E-Cigarette Use Never User       E-Cigarette/Vaping Substances    Nicotine No     THC No     CBD No     Flavoring No     Other No     Unknown No       I have reviewed and agree with the history as documented.     HPI    59-year-old female presenting for evaluation of left hand pain.  Patient states her  has MS.  She was trying to catch him when he fell 3 days ago got her hand pinched between him and a desk.  She states she initially had some mild pain but did not have any swelling.  She started with some swelling and worsening pain of the hand yesterday.  Swelling is mostly located over the 3rd and 4th metacarpals and MCP joints.  She states she has some decreased sensation along the left long and ring finger.  She has right-hand-dominant.  She did take Motrin about 12 hours ago.    Review of Systems   Musculoskeletal:  Positive for arthralgias and joint swelling. Negative for myalgias.   Skin:  Negative for wound.   Neurological:  Positive for numbness. Negative for weakness.   All other systems reviewed and are negative.          Objective       ED Triage Vitals   Temperature Pulse Blood Pressure Respirations SpO2 Patient Position - Orthostatic VS   07/10/25 1337 07/10/25 1315 07/10/25 1315 07/10/25 1315 07/10/25 1315 07/10/25 1315   (!) 97 °F (36.1 °C) 88 (!) 187/85 19 99 % Lying      Temp Source Heart Rate Source BP Location FiO2 (%) Pain Score    07/10/25 1315 07/10/25 1315 07/10/25  1315 -- 07/10/25 1315    Temporal Monitor Right arm  7      Vitals      Date and Time Temp Pulse SpO2 Resp BP Pain Score FACES Pain Rating User   07/10/25 1400 -- 68 100 % 18 159/73 7 -- TB   07/10/25 1341 -- -- -- -- -- 7 --    07/10/25 1337 97 °F (36.1 °C) -- -- -- -- -- --    07/10/25 1315 -- 88 99 % 19 187/85 7 -- TB            Physical Exam  Vitals and nursing note reviewed.   Constitutional:       General: She is not in acute distress.     Appearance: Normal appearance. She is well-developed and normal weight. She is not ill-appearing, toxic-appearing or diaphoretic.   HENT:      Head: Normocephalic and atraumatic.      Right Ear: External ear normal.      Left Ear: External ear normal.      Nose: Nose normal.      Mouth/Throat:      Mouth: Mucous membranes are moist.      Pharynx: Oropharynx is clear.     Eyes:      Extraocular Movements: Extraocular movements intact.      Conjunctiva/sclera: Conjunctivae normal.       Cardiovascular:      Rate and Rhythm: Normal rate and regular rhythm.      Pulses: Normal pulses.   Pulmonary:      Effort: Pulmonary effort is normal. No respiratory distress.   Abdominal:      General: There is no distension.      Palpations: Abdomen is soft.     Musculoskeletal:         General: Swelling and tenderness present.      Cervical back: Neck supple.      Comments: Swelling over the left hand over the 3rd and 4th metacarpals as well as the MCP joints.  Able to flex and extend all of the fingers but range of motion limited secondary to pain.     Skin:     General: Skin is warm and dry.      Coloration: Skin is not pale.      Findings: No erythema or rash.     Neurological:      General: No focal deficit present.      Mental Status: She is alert and oriented to person, place, and time.      Cranial Nerves: No cranial nerve deficit.      Sensory: No sensory deficit.      Motor: No weakness.      Comments: Subjective decrease sensation over left 3rd and 4th finger.   Psychiatric:          Mood and Affect: Mood normal.         Behavior: Behavior normal.         Results Reviewed       None            XR hand 3+ views LEFT    (Results Pending)   XR wrist 3+ views LEFT    (Results Pending)       Procedures    ED Medication and Procedure Management   Prior to Admission Medications   Prescriptions Last Dose Informant Patient Reported? Taking?   Jardiance 10 MG TABS tablet   No No   Sig: TAKE 1 TABLET BY MOUTH ONCE DAILY IN THE MORNING   aspirin (ECOTRIN LOW STRENGTH) 81 mg EC tablet  Self Yes No   Sig: Take 81 mg by mouth daily   benzonatate (TESSALON PERLES) 100 mg capsule   No No   Sig: Take 1 capsule (100 mg total) by mouth 3 (three) times a day as needed for cough   dulaglutide (Trulicity) 0.75 MG/0.5ML injection   No No   Sig: Inject 0.5 mL (0.75 mg total) under the skin once a week   famotidine (PEPCID) 40 MG tablet   No No   Sig: Take 1 tablet (40 mg total) by mouth daily   fluticasone (FLONASE) 50 mcg/act nasal spray   No No   Si sprays into each nostril daily   hydroxyurea (HYDREA) 500 mg capsule   No No   Sig: TAKE 1 CAPSULE BY MOUTH ON ODD NUMBERED DAYS AND 2 CAPSULES BY MOUTH ON EVEN NUMBERED DAYS   losartan (COZAAR) 25 mg tablet   No No   Sig: Take 1 tablet (25 mg total) by mouth daily   nitrofurantoin (MACRODANTIN) 50 mg capsule   No No   Sig: Take 1 capsule (50 mg total) by mouth daily   ondansetron (ZOFRAN-ODT) 4 mg disintegrating tablet   No No   Sig: Take 1 tablet (4 mg total) by mouth every 6 (six) hours as needed for nausea or vomiting   oxybutynin (DITROPAN-XL) 10 MG 24 hr tablet   No No   Sig: Take 2 tablets (20 mg total) by mouth in the morning   promethazine-dextromethorphan (PHENERGAN-DM) 6.25-15 mg/5 mL oral syrup   No No   Sig: Take 5 mL by mouth 4 (four) times a day as needed for cough   simvastatin (ZOCOR) 20 mg tablet   No No   Sig: Take 1 tablet (20 mg total) by mouth daily at bedtime      Facility-Administered Medications: None     Discharge Medication List as of  7/10/2025  2:34 PM        CONTINUE these medications which have NOT CHANGED    Details   aspirin (ECOTRIN LOW STRENGTH) 81 mg EC tablet Take 81 mg by mouth daily, Historical Med      benzonatate (TESSALON PERLES) 100 mg capsule Take 1 capsule (100 mg total) by mouth 3 (three) times a day as needed for cough, Starting Wed 9/11/2024, Normal      dulaglutide (Trulicity) 0.75 MG/0.5ML injection Inject 0.5 mL (0.75 mg total) under the skin once a week, Starting Wed 9/11/2024, Normal      famotidine (PEPCID) 40 MG tablet Take 1 tablet (40 mg total) by mouth daily, Starting Fri 4/25/2025, Normal      fluticasone (FLONASE) 50 mcg/act nasal spray 2 sprays into each nostril daily, Starting Wed 9/11/2024, Normal      hydroxyurea (HYDREA) 500 mg capsule TAKE 1 CAPSULE BY MOUTH ON ODD NUMBERED DAYS AND 2 CAPSULES BY MOUTH ON EVEN NUMBERED DAYS, Normal      Jardiance 10 MG TABS tablet TAKE 1 TABLET BY MOUTH ONCE DAILY IN THE MORNING, Starting Wed 4/16/2025, Normal      losartan (COZAAR) 25 mg tablet Take 1 tablet (25 mg total) by mouth daily, Starting Wed 3/19/2025, Normal      nitrofurantoin (MACRODANTIN) 50 mg capsule Take 1 capsule (50 mg total) by mouth daily, Starting Tue 5/13/2025, Normal      ondansetron (ZOFRAN-ODT) 4 mg disintegrating tablet Take 1 tablet (4 mg total) by mouth every 6 (six) hours as needed for nausea or vomiting, Starting Wed 9/11/2024, Normal      oxybutynin (DITROPAN-XL) 10 MG 24 hr tablet Take 2 tablets (20 mg total) by mouth in the morning, Starting Wed 9/11/2024, Normal      promethazine-dextromethorphan (PHENERGAN-DM) 6.25-15 mg/5 mL oral syrup Take 5 mL by mouth 4 (four) times a day as needed for cough, Starting Mon 5/12/2025, Normal      simvastatin (ZOCOR) 20 mg tablet Take 1 tablet (20 mg total) by mouth daily at bedtime, Starting Wed 3/19/2025, Normal             ED SEPSIS DOCUMENTATION   Time reflects when diagnosis was documented in both MDM as applicable and the Disposition within this note        Time User Action Codes Description Comment    7/10/2025  2:33 PM [place], [place] Add [M79.89] Hand swelling                      [1]   Past Medical History:  Diagnosis Date    Arthritis     Cancer (HCC)      chronic leukemia    Chronic kidney disease     cyst in left kidney.    Diabetes mellitus (HCC)     Frequent sinus infections     Pt states she has a mass in left side of sinus    GERD (gastroesophageal reflux disease)     GI bleed 03/17/2019    History of echocardiogram 11/19/2008    Normal.    History of nuclear stress test 11/19/2008    EF 66%, No evidence for ischemia.    Hyperlipidemia     Hypertension     Missing tooth, acquired     Pt reports losing a tooth d/t chemotherapy    Personal history of COVID-19 01/2022    mild symptoms, recovered at home    PONV (postoperative nausea and vomiting)     Tumor     Urethral    Wears glasses    [2]   Past Surgical History:  Procedure Laterality Date    APPENDECTOMY      BLADDER AUGMENTATION      CHOLECYSTECTOMY      COLONOSCOPY      CYSTOSCOPY  12/28/2020    CYSTOSCOPY N/A 11/28/2022    Procedure: CYSTOSCOPY, excision of vaginal/periurethral mass transvaginally;  Surgeon: Quintin Greene MD;  Location: [facility];  Service: Urology    FL CYSTOGRAM  12/19/2022    HERNIA REPAIR Right     inguinal    HYSTERECTOMY  2004    IR AORTAGRAM WITH RUN-OFF  06/14/2019    KNEE ARTHROSCOPY Bilateral     TX COLONOSCOPY FLX DX W/COLLJ SPEC WHEN PFRMD N/A 01/23/2018    Procedure: COLONOSCOPY;  Surgeon: Samira Goncalves DO;  Location: [facility];  Service: Gastroenterology    TX SLCTV CATHJ 3RD+ ORD SLCTV ABDL PEL/LXTR BRNCH Left 06/14/2019    Procedure: ARTERIOGRAM-arteriography and possible endovascular intervention.;  Surgeon: Lee Cantu MD;  Location: [facility];  Service: Vascular    PUBOVAGINAL SLING N/A 03/17/2021    Procedure: Open transvaginal urethral biopsy;  Surgeon: Quintin Greene MD;  Location:  MAIN OR;  Service: Urology    REDUCTION MAMMAPLASTY  2001    SHOULDER  SURGERY Left     WISDOM TOOTH EXTRACTION      WISDOM TOOTH EXTRACTION     [3]   Family History  Problem Relation Name Age of Onset    Breast cancer Mother  58    Heart disease Father      Diabetes Father      Hypertension Father      No Known Problems Daughter      Cancer Maternal Grandmother          vulvar cancer    No Known Problems Maternal Grandfather      Heart disease Paternal Grandmother      No Known Problems Paternal Grandfather      Breast cancer Paternal Aunt      Breast cancer Paternal Aunt      Lung cancer Paternal Aunt      Lung cancer Paternal Aunt      No Known Problems Paternal Aunt      No Known Problems Paternal Aunt      No Known Problems Paternal Aunt     [4]   Social History  Tobacco Use    Smoking status: Every Day     Current packs/day: 0.25     Average packs/day: 0.3 packs/day for 35.0 years (8.8 ttl pk-yrs)     Types: Cigarettes    Smokeless tobacco: Never   Vaping Use    Vaping status: Never Used   Substance Use Topics    Alcohol use: Not Currently     Alcohol/week: 0.0 standard drinks of alcohol     Comment: N/A    Drug use: No        Cass Brito MD  07/11/25 0811

## 2025-07-16 ENCOUNTER — OFFICE VISIT (OUTPATIENT)
Dept: OBGYN CLINIC | Facility: CLINIC | Age: 60
End: 2025-07-16
Payer: COMMERCIAL

## 2025-07-16 VITALS
TEMPERATURE: 98.4 F | OXYGEN SATURATION: 98 % | RESPIRATION RATE: 16 BRPM | HEART RATE: 76 BPM | WEIGHT: 150.8 LBS | HEIGHT: 63 IN | BODY MASS INDEX: 26.72 KG/M2

## 2025-07-16 DIAGNOSIS — S60.222A CONTUSION OF LEFT HAND, INITIAL ENCOUNTER: ICD-10-CM

## 2025-07-16 DIAGNOSIS — S63.419A TRAUMATIC RUPTURE OF COLLATERAL LIGAMENT OF METACARPOPHALANGEAL (MCP) JOINT OF FINGER: Primary | ICD-10-CM

## 2025-07-16 PROCEDURE — 99204 OFFICE O/P NEW MOD 45 MIN: CPT | Performed by: STUDENT IN AN ORGANIZED HEALTH CARE EDUCATION/TRAINING PROGRAM

## 2025-07-16 PROCEDURE — 29131 APPL FINGER SPLINT DYNAMIC: CPT | Performed by: STUDENT IN AN ORGANIZED HEALTH CARE EDUCATION/TRAINING PROGRAM

## 2025-07-16 RX ORDER — NAPROXEN 500 MG/1
500 TABLET ORAL 2 TIMES DAILY WITH MEALS
Qty: 28 TABLET | Refills: 0 | Status: SHIPPED | OUTPATIENT
Start: 2025-07-16

## 2025-07-16 NOTE — PROGRESS NOTES
ASSESSMENT/PLAN:    Assessment & Plan  Contusion of left hand, initial encounter    Orders:    Ambulatory Referral to Orthopedic Surgery    naproxen (EC NAPROSYN) 500 MG EC tablet; Take 1 tablet (500 mg total) by mouth 2 (two) times a day with meals      Traumatic rupture of collateral ligament of metacarpophalangeal (MCP) joint of finger  Patient is a 59-year-old female presenting with a contusion to the third finger dorsal MCP joint as well as a sprain to the ulnar collateral ligament.    I discussed the etiology of the patient's injury.  We discussed that the MP joints of the fingers are stabilized by ligaments on the radial and ulnar aspects.  These ligaments stabilize the finger and side-to-side deviation specifically when the finger is flexed.  He has increased laxity of the middle finger when stressing the radial collateral ligament indicating a likely tear of the radial collateral ligament.  I did discuss with her when this occurs it can lead to instability of the joint.  Without proper healing over time this instability can lead to early arthritis.  We discussed the treatment options for this ligament.  Conservative and operative treatment options are available.  Conservative option is the mainstay and the primary treatment for the vast majority of these.  Conservative treatment consist of stewart taping for 6 weeks as it takes approximately 6 weeks for the ligament to provisionally heal.  Surgical treatment options are available these are typically reserved for index finger injuries, high-level athletes with nonindex finger injuries, or for those who have failed initial conservative management treatment.  Operative management would consist of repair or reconstruction of the radial collateral ligament in an open fashion.  We briefly discussed the risk and benefits of this.  After hearing the options the patient elected to proceed with conservative management.  1 thing of note is that conservative management  can still result in instability of the MP joint.  If this were to occur in the future and he was found to fail the conservative option then at that point surgery may be recommended.     Plan:   I had a discussion with the patient regarding my clinical findings, diagnosis, and treatment plan.  All questions answered.   Patient was provided stewart loops for splint stabilization  I reviewed the clinical notes from the ED on 7/10/25  I reviewed the x-rays from the ED visit on 7/10/2025.  Encouraged patient to continue active range of motion to avoid stiffness   Prescription for Naproxen 500mg sent to local pharmacy. Patient was instructed to take medication with food, she should not take any other NSAIDs in combination.   Next Visit:  Patient to follow up in 4 weeks for reevaluation           _____________________________________________________  CHIEF COMPLAINT:  L hand pain and swelling       SUBJECTIVE:  Mary Kay is a 59 y.o. right hand dominant female presenting for evaluation of her left hand. The patient states that approximately 1 week ago, her  fell into her. Her hand was caught between his entire body weight and the metal part of a chair. After injury he was initially evaluated by emergency room.  They were referred for orthopedic follow-up.  Since that time she reports pain over the third metacarpal, with associated swelling. She states that the swelling has improved. They do not have numbness or tingling in the hand including no numbness or tingling in the median nerve distribution.  There is no pain in the elbow or shoulder.  Here to establish care.    DOI: 7/10/2025        PAST MEDICAL HISTORY:  Past Medical History[1]    PAST SURGICAL HISTORY:  Past Surgical History[2]    FAMILY HISTORY:  Family History[3]    SOCIAL HISTORY:  Social History[4]    MEDICATIONS:  Current Medications[5]    ALLERGIES:  Allergies[6]    REVIEW OF SYSTEMS:  Pertinent items are noted in HPI.  A comprehensive review of  "systems was negative.    LABS:  HgA1c:   Lab Results   Component Value Date    HGBA1C 6.7 (H) 05/05/2025     BMP:   Lab Results   Component Value Date    GLUCOSE 138 05/09/2014    CALCIUM 8.7 05/05/2025     05/09/2014    K 3.8 05/05/2025    CO2 26 05/05/2025     05/05/2025    BUN 19 05/05/2025    CREATININE 0.76 05/05/2025         _____________________________________________________  PHYSICAL EXAMINATION:  Vital signs: Pulse 76   Temp 98.4 °F (36.9 °C) (Temporal)   Resp 16   Ht 5' 3\" (1.6 m)   Wt 68.4 kg (150 lb 12.8 oz)   SpO2 98%   BMI 26.71 kg/m²   General: well developed and well nourished, alert, oriented times 3, and appears comfortable  Psychiatric: Normal  HEENT: Trachea Midline, No torticollis  Cardiovascular: No discernable arrhythmia  Pulmonary: No wheezing or stridor  Abdomen: No rebound or guarding  Extremities: No peripheral edema  Skin: No masses, erythema, lacerations, fluctation, ulcerations  Neurovascular: Sensation Intact to the Median, Ulnar, Radial Nerve, Motor Intact to the Median, Ulnar, Radial Nerve, and Pulses Intact    MUSCULOSKELETAL EXAMINATION:  Left hand  Skin intact  Swelling over dorsal, ulnar MP joint  Slight laxity of the MP joint to stress of the UCL with the MCP flexed to 90 degrees  EDC stays centralized through flexion and extension  TTP:  Radial styloid: no   Scaphoid tubercle: no   Anatomic snuff box: no  SL interval: no   Fovea: no  Ulnocarpal joint: no   Pisotriquetral compression: no   ECU: no   Fovea sign: negative   DRUJ stable in pronation, neutral, and pronation. no tenderness  No tenderness over 1,2,4,5 metacarpal  No tenderness over phalanges of 3rd digit   Tenderness over the 3rd metacarpal head  Range of Motion:  Elbow: extension/flexion 0/140  Forearm: pronation/supination 80/80  Wrist: extension/flexion 70/70  Digit: full AROM in DIP, PIP, MP joints  Motor Exam: firing AIN/PIN/U  Sensory Exam: Sensation intact to light touch in FDWS (radial), " volar IF (median), volar SF (ulnar)  Vascular Exam: 2+ radial pulse, < 2 sec capillary refill   No triggering or locking over A1 pulley of 3rd   RCL and UCL to MP joint are stable on exam  Pain with MP flexion   _____________________________________________________  STUDIES REVIEWED:  X-Ray of left hand obtained on 7/10/2025 were personally reviewed and demonstrate no acute osseous abnormalities. Chronic calcification at the ulnar aspect of the 3rd MP joint.    X-Ray of left wrist obtained on 7/10/2025 were personally reviewed and demonstrate no acute osseous abnormalities.      PROCEDURES PERFORMED:  Splint application    Date/Time: 7/16/2025 1:15 PM    Performed by: Teddy Castaneda MD  Authorized by: Teddy Castaneda MD    Verbal consent obtained?: Yes    Risks and benefits: Risks, benefits and alternatives were discussed    Consent given by:  Patient  Patient states understanding of procedure being performed: Yes    Radiology Images displayed and confirmed. If images not available, report reviewed: Yes    Patient identity confirmed:  Verbally with patient  Procedure details:     Laterality:  Left    Location:  Finger    Finger:  L long finger    Cast type:  Finger    Splint composition: dynamic      Splint type:  Finger splint, dynamic      Scribe Attestation      I,:  Paulo Masters am acting as a scribe while in the presence of the attending physician.:       I,:  Teddy Castaneda MD personally performed the services described in this documentation    as scribed in my presence.:                [1]   Past Medical History:  Diagnosis Date    Arthritis     Cancer (HCC)      chronic leukemia    Chronic kidney disease     cyst in left kidney.    Diabetes mellitus (HCC)     Frequent sinus infections     Pt states she has a mass in left side of sinus    GERD (gastroesophageal reflux disease)     GI bleed 03/17/2019    History of echocardiogram 11/19/2008    Normal.    History of nuclear stress test 11/19/2008    EF 66%, No  evidence for ischemia.    Hyperlipidemia     Hypertension     Missing tooth, acquired     Pt reports losing a tooth d/t chemotherapy    Personal history of COVID-19 01/2022    mild symptoms, recovered at home    PONV (postoperative nausea and vomiting)     Tumor     Urethral    Wears glasses    [2]   Past Surgical History:  Procedure Laterality Date    APPENDECTOMY      BLADDER AUGMENTATION      CHOLECYSTECTOMY      COLONOSCOPY      CYSTOSCOPY  12/28/2020    CYSTOSCOPY N/A 11/28/2022    Procedure: CYSTOSCOPY, excision of vaginal/periurethral mass transvaginally;  Surgeon: Quintin Greene MD;  Location: [facility];  Service: Urology    FL CYSTOGRAM  12/19/2022    HERNIA REPAIR Right     inguinal    HYSTERECTOMY  2004    IR AORTAGRAM WITH RUN-OFF  06/14/2019    KNEE ARTHROSCOPY Bilateral     LA COLONOSCOPY FLX DX W/COLLJ SPEC WHEN PFRMD N/A 01/23/2018    Procedure: COLONOSCOPY;  Surgeon: Samira Goncalves DO;  Location: [facility];  Service: Gastroenterology    LA SLCTV CATHJ 3RD+ ORD SLCTV ABDL PEL/LXTR BRNCH Left 06/14/2019    Procedure: ARTERIOGRAM-arteriography and possible endovascular intervention.;  Surgeon: Lee Cantu MD;  Location:  MAIN OR;  Service: Vascular    PUBOVAGINAL SLING N/A 03/17/2021    Procedure: Open transvaginal urethral biopsy;  Surgeon: Quintin Greene MD;  Location:  MAIN OR;  Service: Urology    REDUCTION MAMMAPLASTY  2001    SHOULDER SURGERY Left     WISDOM TOOTH EXTRACTION      WISDOM TOOTH EXTRACTION     [3]   Family History  Problem Relation Name Age of Onset    Breast cancer Mother Brit 58    Cancer Mother Brit     Heart disease Father Remy     Diabetes Father Remy     Hypertension Father Remy     No Known Problems Daughter      Cancer Maternal Grandmother G         vulvar cancer    No Known Problems Maternal Grandfather      Heart disease Paternal Grandmother      No Known Problems Paternal Grandfather      Breast cancer Paternal Aunt      Breast cancer Paternal Aunt       Lung cancer Paternal Aunt      Lung cancer Paternal Aunt      No Known Problems Paternal Aunt      No Known Problems Paternal Aunt      No Known Problems Paternal Aunt     [4]   Social History  Tobacco Use    Smoking status: Every Day     Current packs/day: 0.25     Average packs/day: 0.8 packs/day for 59.1 years (44.9 ttl pk-yrs)     Types: Cigarettes    Smokeless tobacco: Never   Vaping Use    Vaping status: Never Used   Substance Use Topics    Alcohol use: Not Currently     Comment: N/A    Drug use: No   [5]   Current Outpatient Medications:     aspirin (ECOTRIN LOW STRENGTH) 81 mg EC tablet, Take 81 mg by mouth in the morning., Disp: , Rfl:     benzonatate (TESSALON PERLES) 100 mg capsule, Take 1 capsule (100 mg total) by mouth 3 (three) times a day as needed for cough, Disp: 20 capsule, Rfl: 5    dulaglutide (Trulicity) 0.75 MG/0.5ML injection, Inject 0.5 mL (0.75 mg total) under the skin once a week, Disp: 2 mL, Rfl: 5    famotidine (PEPCID) 40 MG tablet, Take 1 tablet (40 mg total) by mouth daily, Disp: 90 tablet, Rfl: 0    fluticasone (FLONASE) 50 mcg/act nasal spray, 2 sprays into each nostril daily, Disp: 16 g, Rfl: 5    hydroxyurea (HYDREA) 500 mg capsule, TAKE 1 CAPSULE BY MOUTH ON ODD NUMBERED DAYS AND 2 CAPSULES BY MOUTH ON EVEN NUMBERED DAYS, Disp: 45 capsule, Rfl: 11    Jardiance 10 MG TABS tablet, TAKE 1 TABLET BY MOUTH ONCE DAILY IN THE MORNING, Disp: 90 tablet, Rfl: 3    losartan (COZAAR) 25 mg tablet, Take 1 tablet (25 mg total) by mouth daily, Disp: 90 tablet, Rfl: 1    naproxen (EC NAPROSYN) 500 MG EC tablet, Take 1 tablet (500 mg total) by mouth 2 (two) times a day with meals, Disp: 28 tablet, Rfl: 0    nitrofurantoin (MACRODANTIN) 50 mg capsule, Take 1 capsule (50 mg total) by mouth daily, Disp: 30 capsule, Rfl: 11    ondansetron (ZOFRAN-ODT) 4 mg disintegrating tablet, Take 1 tablet (4 mg total) by mouth every 6 (six) hours as needed for nausea or vomiting, Disp: 20 tablet, Rfl: 5     oxybutynin (DITROPAN-XL) 10 MG 24 hr tablet, Take 2 tablets (20 mg total) by mouth in the morning, Disp: 60 tablet, Rfl: 12    promethazine-dextromethorphan (PHENERGAN-DM) 6.25-15 mg/5 mL oral syrup, Take 5 mL by mouth 4 (four) times a day as needed for cough, Disp: 240 mL, Rfl: 1    simvastatin (ZOCOR) 20 mg tablet, Take 1 tablet (20 mg total) by mouth daily at bedtime, Disp: 90 tablet, Rfl: 1  [6]   Allergies  Allergen Reactions    Chantix [Varenicline] Other (See Comments)     Psychiatric side effects    Clomiphene Hives      Brand name - clomid    Metformin      Lactic acidosis    Wellbutrin [Bupropion] Other (See Comments)     Psychiatric side effects    Latex Blisters

## 2025-07-23 DIAGNOSIS — K29.00 ACUTE GASTRITIS WITHOUT HEMORRHAGE, UNSPECIFIED GASTRITIS TYPE: ICD-10-CM

## 2025-07-23 NOTE — TELEPHONE ENCOUNTER
Reason for call:   [x] Refill   [] Prior Auth  [] Other:     Office:   [x] PCP/Provider - Josh WHITT / [facility]    Medication: famotidine    Dose/Frequency: 40mg qd    Quantity: 90    Pharmacy: [id number]  [place] [address], [address]     Local Pharmacy   Does the patient have enough for 3 days?   [x] Yes   [] No - Send as HP to POD

## 2025-07-24 RX ORDER — FAMOTIDINE 40 MG/1
40 TABLET, FILM COATED ORAL DAILY
Qty: 90 TABLET | Refills: 1 | Status: SHIPPED | OUTPATIENT
Start: 2025-07-24

## 2025-08-13 ENCOUNTER — APPOINTMENT (OUTPATIENT)
Dept: LAB | Facility: MEDICAL CENTER | Age: 60
End: 2025-08-13
Attending: NURSE PRACTITIONER
Payer: COMMERCIAL

## 2025-08-13 ENCOUNTER — OFFICE VISIT (OUTPATIENT)
Dept: OBGYN CLINIC | Facility: CLINIC | Age: 60
End: 2025-08-13
Payer: COMMERCIAL

## 2025-08-13 DIAGNOSIS — Z29.89 NEED FOR PROPHYLAXIS AGAINST URINARY TRACT INFECTION: ICD-10-CM

## 2025-08-13 DIAGNOSIS — E11.65 TYPE 2 DIABETES MELLITUS WITH HYPERGLYCEMIA, WITHOUT LONG-TERM CURRENT USE OF INSULIN (HCC): ICD-10-CM

## 2025-08-13 DIAGNOSIS — D45 POLYCYTHEMIA VERA (HCC): ICD-10-CM

## 2025-08-13 LAB
ALBUMIN SERPL BCG-MCNC: 3.8 G/DL (ref 3.5–5)
ALP SERPL-CCNC: 80 U/L (ref 34–104)
ALT SERPL W P-5'-P-CCNC: 9 U/L (ref 7–52)
ANION GAP SERPL CALCULATED.3IONS-SCNC: 10 MMOL/L (ref 4–13)
AST SERPL W P-5'-P-CCNC: 15 U/L (ref 13–39)
BASOPHILS # BLD AUTO: 0.04 THOUSANDS/ÂΜL (ref 0–0.1)
BASOPHILS NFR BLD AUTO: 1 % (ref 0–1)
BILIRUB SERPL-MCNC: 0.84 MG/DL (ref 0.2–1)
BUN SERPL-MCNC: 13 MG/DL (ref 5–25)
CALCIUM SERPL-MCNC: 9.2 MG/DL (ref 8.4–10.2)
CHLORIDE SERPL-SCNC: 107 MMOL/L (ref 96–108)
CO2 SERPL-SCNC: 24 MMOL/L (ref 21–32)
CREAT SERPL-MCNC: 0.84 MG/DL (ref 0.6–1.3)
CREAT UR-MCNC: 78.4 MG/DL
EOSINOPHIL # BLD AUTO: 0.11 THOUSAND/ÂΜL (ref 0–0.61)
EOSINOPHIL NFR BLD AUTO: 2 % (ref 0–6)
ERYTHROCYTE [DISTWIDTH] IN BLOOD BY AUTOMATED COUNT: 15 % (ref 11.6–15.1)
EST. AVERAGE GLUCOSE BLD GHB EST-MCNC: 128 MG/DL
GFR SERPL CREATININE-BSD FRML MDRD: 76 ML/MIN/1.73SQ M
GLUCOSE SERPL-MCNC: 124 MG/DL (ref 65–140)
HBA1C MFR BLD: 6.1 %
HCT VFR BLD AUTO: 38.4 % (ref 34.8–46.1)
HGB BLD-MCNC: 12.8 G/DL (ref 11.5–15.4)
IMM GRANULOCYTES # BLD AUTO: 0.02 THOUSAND/UL (ref 0–0.2)
IMM GRANULOCYTES NFR BLD AUTO: 0 % (ref 0–2)
LYMPHOCYTES # BLD AUTO: 2.2 THOUSANDS/ÂΜL (ref 0.6–4.47)
LYMPHOCYTES NFR BLD AUTO: 32 % (ref 14–44)
MCH RBC QN AUTO: 40.5 PG (ref 26.8–34.3)
MCHC RBC AUTO-ENTMCNC: 33.3 G/DL (ref 31.4–37.4)
MCV RBC AUTO: 122 FL (ref 82–98)
MICROALBUMIN UR-MCNC: 14 MG/L
MICROALBUMIN/CREAT 24H UR: 18 MG/G CREATININE (ref 0–30)
MONOCYTES # BLD AUTO: 0.32 THOUSAND/ÂΜL (ref 0.17–1.22)
MONOCYTES NFR BLD AUTO: 5 % (ref 4–12)
NEUTROPHILS # BLD AUTO: 4.14 THOUSANDS/ÂΜL (ref 1.85–7.62)
NEUTS SEG NFR BLD AUTO: 60 % (ref 43–75)
NRBC BLD AUTO-RTO: 0 /100 WBCS
PLATELET # BLD AUTO: 243 THOUSANDS/UL (ref 149–390)
PMV BLD AUTO: 10.1 FL (ref 8.9–12.7)
POTASSIUM SERPL-SCNC: 4 MMOL/L (ref 3.5–5.3)
PROT SERPL-MCNC: 7.2 G/DL (ref 6.4–8.4)
RBC # BLD AUTO: 3.16 MILLION/UL (ref 3.81–5.12)
SODIUM SERPL-SCNC: 141 MMOL/L (ref 135–147)
WBC # BLD AUTO: 6.83 THOUSAND/UL (ref 4.31–10.16)

## 2025-08-13 PROCEDURE — 82043 UR ALBUMIN QUANTITATIVE: CPT

## 2025-08-13 PROCEDURE — 83036 HEMOGLOBIN GLYCOSYLATED A1C: CPT

## 2025-08-13 PROCEDURE — 82570 ASSAY OF URINE CREATININE: CPT

## 2025-08-13 PROCEDURE — 36415 COLL VENOUS BLD VENIPUNCTURE: CPT

## 2025-08-13 PROCEDURE — 85025 COMPLETE CBC W/AUTO DIFF WBC: CPT

## 2025-08-13 PROCEDURE — 80053 COMPREHEN METABOLIC PANEL: CPT

## (undated) DEVICE — 1820 FOAM BLOCK NEEDLE COUNTER: Brand: DEVON

## (undated) DEVICE — TRANSPOSAL ULTRAFLEX DUO/QUAD ULTRA CART MANIFOLD

## (undated) DEVICE — NEEDLE 18 G X 1 1/2

## (undated) DEVICE — TUBING INJECTOR HIGH PRESSURE 91051482

## (undated) DEVICE — KERLIX BANDAGE ROLL: Brand: KERLIX

## (undated) DEVICE — PINNACLE R/O II INTRODUCER SHEATH WITH RADIOPAQUE MARKER: Brand: PINNACLE

## (undated) DEVICE — URO CATCHER BAG STERILE 0-UC32

## (undated) DEVICE — TUBING SUCTION 5MM X 12 FT

## (undated) DEVICE — GUIDEWIRE STRGHT TIP 0.035 IN  SOLO PLUS

## (undated) DEVICE — GAUZE SPONGES,16 PLY: Brand: CURITY

## (undated) DEVICE — CATH DIAG 5FR .035 65CM 6S OMMI-FLUSH

## (undated) DEVICE — CATH BAL CHARGER 7 X 40MM X 75CM

## (undated) DEVICE — SYRINGE KIT,PACKAGED,,150FT,MK 7(ANGIO-ARTERION, 150ML SYR KIT W/QFT,MC)(60729385): Brand: MEDRAD® MARK 7 ARTERION DISPOSABLE SYRINGE 150 ML WITH QUICK FILL TUBE

## (undated) DEVICE — INFUSER BAG 3000ML

## (undated) DEVICE — NON-DEHP HIGH FLOW RATE EXTENSION SET, MALE LUER LOCK ADAPTER

## (undated) DEVICE — Device

## (undated) DEVICE — INTENDED FOR TISSUE SEPARATION, AND OTHER PROCEDURES THAT REQUIRE A SHARP SURGICAL BLADE TO PUNCTURE OR CUT.: Brand: BARD-PARKER SAFETY BLADES SIZE 15, STERILE

## (undated) DEVICE — LIGHT HANDLE COVER SLEEVE DISP BLUE STELLAR

## (undated) DEVICE — SUT VICRYL 2-0 SH 27 IN UNDYED J417H

## (undated) DEVICE — SCD SEQUENTIAL COMPRESSION COMFORT SLEEVE MEDIUM KNEE LENGTH: Brand: KENDALL SCD

## (undated) DEVICE — SKIN MARKER DUAL TIP WITH RULER CAP, FLEXIBLE RULER AND LABELS: Brand: DEVON

## (undated) DEVICE — SNAP KOVER: Brand: UNBRANDED

## (undated) DEVICE — CHLORHEXIDINE 4PCT 4 OZ

## (undated) DEVICE — PACK TUR

## (undated) DEVICE — GLOVE INDICATOR PI UNDERGLOVE SZ 8 BLUE

## (undated) DEVICE — ABDOMINAL PAD: Brand: DERMACEA

## (undated) DEVICE — LUBRICANT SURGILUBE TUBE 4 OZ  FLIP TOP

## (undated) DEVICE — SPECIMEN CONTAINER STERILE PEEL PACK

## (undated) DEVICE — BASIC SINGLE BASIN 2-LF: Brand: MEDLINE INDUSTRIES, INC.

## (undated) DEVICE — SYRINGE 10ML LL

## (undated) DEVICE — MAT FLOOR STEP DRI 24 X 36 IN N-STRL

## (undated) DEVICE — GUIDEWIRE AMPLATZ .035 180CM 6CM ST SS

## (undated) DEVICE — 2000CC GUARDIAN II: Brand: GUARDIAN

## (undated) DEVICE — FLUID MANAGEMENT KIT - IR

## (undated) DEVICE — PRESTO™ INFLATION DEVICE: Brand: PRESTO

## (undated) DEVICE — NEEDLE HYPO 2G X 1 IN

## (undated) DEVICE — STERILE ICS CARDIOVASCULAR PK: Brand: CARDINAL HEALTH

## (undated) DEVICE — INTRO SHEATH 6FR .038 30CM CHECK-FLO

## (undated) DEVICE — TELFA NON-ADHERENT ABSORBENT DRESSING: Brand: TELFA

## (undated) DEVICE — 3M™ TEGADERM™ TRANSPARENT FILM DRESSING FRAME STYLE, 1626W, 4 IN X 4-3/4 IN (10 CM X 12 CM), 50/CT 4CT/CASE: Brand: 3M™ TEGADERM™

## (undated) DEVICE — 3M™ TEGADERM™ TRANSPARENT FILM DRESSING FRAME STYLE, 1624W, 2-3/8 IN X 2-3/4 IN (6 CM X 7 CM), 100/CT 4CT/CASE: Brand: 3M™ TEGADERM™

## (undated) DEVICE — SUT ETHILON 2-0 FS 18 IN 664H

## (undated) DEVICE — SUT VICRYL 4-0 RB-1 27 IN J214H

## (undated) DEVICE — INVIEW CLEAR LEGGINGS: Brand: CONVERTORS

## (undated) DEVICE — 4 F TEMPO AQUA 0.038  65CM VER: Brand: TEMPO AQUA

## (undated) DEVICE — NEEDLE 25G X 1 1/2

## (undated) DEVICE — Device: Brand: LEVEL 1

## (undated) DEVICE — ENDOSCOPIC VALVE WITH ADAPTER.: Brand: SURSEAL® II

## (undated) DEVICE — MICROPUNCTURE 501

## (undated) DEVICE — LIGHT GLOVE GREEN

## (undated) DEVICE — COVER PROBE INTRAOPERATIVE 6 X 96 IN